# Patient Record
Sex: FEMALE | Race: ASIAN | NOT HISPANIC OR LATINO | URBAN - METROPOLITAN AREA
[De-identification: names, ages, dates, MRNs, and addresses within clinical notes are randomized per-mention and may not be internally consistent; named-entity substitution may affect disease eponyms.]

---

## 2019-02-25 ENCOUNTER — EMERGENCY (EMERGENCY)
Facility: HOSPITAL | Age: 29
LOS: 1 days | Discharge: HOME | End: 2019-02-25
Admitting: EMERGENCY MEDICINE
Payer: MEDICAID

## 2019-02-25 VITALS
HEART RATE: 87 BPM | RESPIRATION RATE: 17 BRPM | SYSTOLIC BLOOD PRESSURE: 102 MMHG | TEMPERATURE: 98 F | OXYGEN SATURATION: 99 % | DIASTOLIC BLOOD PRESSURE: 67 MMHG

## 2019-02-25 LAB
ALBUMIN SERPL ELPH-MCNC: 4.2 G/DL — SIGNIFICANT CHANGE UP (ref 3.3–5)
ALP SERPL-CCNC: 56 U/L — SIGNIFICANT CHANGE UP (ref 40–120)
ALT FLD-CCNC: 36 U/L — HIGH (ref 4–33)
ANION GAP SERPL CALC-SCNC: 11 MMO/L — SIGNIFICANT CHANGE UP (ref 7–14)
APPEARANCE UR: SIGNIFICANT CHANGE UP
AST SERPL-CCNC: 26 U/L — SIGNIFICANT CHANGE UP (ref 4–32)
BACTERIA # UR AUTO: HIGH
BASOPHILS # BLD AUTO: 0.02 K/UL — SIGNIFICANT CHANGE UP (ref 0–0.2)
BASOPHILS NFR BLD AUTO: 0.3 % — SIGNIFICANT CHANGE UP (ref 0–2)
BILIRUB SERPL-MCNC: 0.3 MG/DL — SIGNIFICANT CHANGE UP (ref 0.2–1.2)
BILIRUB UR-MCNC: NEGATIVE — SIGNIFICANT CHANGE UP
BLD GP AB SCN SERPL QL: NEGATIVE — SIGNIFICANT CHANGE UP
BLOOD UR QL VISUAL: HIGH
BUN SERPL-MCNC: 7 MG/DL — SIGNIFICANT CHANGE UP (ref 7–23)
CALCIUM SERPL-MCNC: 8.8 MG/DL — SIGNIFICANT CHANGE UP (ref 8.4–10.5)
CHLORIDE SERPL-SCNC: 103 MMOL/L — SIGNIFICANT CHANGE UP (ref 98–107)
CO2 SERPL-SCNC: 24 MMOL/L — SIGNIFICANT CHANGE UP (ref 22–31)
COLOR SPEC: YELLOW — SIGNIFICANT CHANGE UP
CREAT SERPL-MCNC: 0.63 MG/DL — SIGNIFICANT CHANGE UP (ref 0.5–1.3)
EOSINOPHIL # BLD AUTO: 0.06 K/UL — SIGNIFICANT CHANGE UP (ref 0–0.5)
EOSINOPHIL NFR BLD AUTO: 0.8 % — SIGNIFICANT CHANGE UP (ref 0–6)
GLUCOSE SERPL-MCNC: 77 MG/DL — SIGNIFICANT CHANGE UP (ref 70–99)
GLUCOSE UR-MCNC: NEGATIVE — SIGNIFICANT CHANGE UP
HCG SERPL-ACNC: SIGNIFICANT CHANGE UP MIU/ML
HCT VFR BLD CALC: 39.5 % — SIGNIFICANT CHANGE UP (ref 34.5–45)
HGB BLD-MCNC: 12.5 G/DL — SIGNIFICANT CHANGE UP (ref 11.5–15.5)
HYALINE CASTS # UR AUTO: SIGNIFICANT CHANGE UP
IMM GRANULOCYTES NFR BLD AUTO: 0.3 % — SIGNIFICANT CHANGE UP (ref 0–1.5)
KETONES UR-MCNC: NEGATIVE — SIGNIFICANT CHANGE UP
LEUKOCYTE ESTERASE UR-ACNC: SIGNIFICANT CHANGE UP
LYMPHOCYTES # BLD AUTO: 1.87 K/UL — SIGNIFICANT CHANGE UP (ref 1–3.3)
LYMPHOCYTES # BLD AUTO: 23.6 % — SIGNIFICANT CHANGE UP (ref 13–44)
MCHC RBC-ENTMCNC: 29.2 PG — SIGNIFICANT CHANGE UP (ref 27–34)
MCHC RBC-ENTMCNC: 31.6 % — LOW (ref 32–36)
MCV RBC AUTO: 92.3 FL — SIGNIFICANT CHANGE UP (ref 80–100)
MONOCYTES # BLD AUTO: 0.72 K/UL — SIGNIFICANT CHANGE UP (ref 0–0.9)
MONOCYTES NFR BLD AUTO: 9.1 % — SIGNIFICANT CHANGE UP (ref 2–14)
NEUTROPHILS # BLD AUTO: 5.22 K/UL — SIGNIFICANT CHANGE UP (ref 1.8–7.4)
NEUTROPHILS NFR BLD AUTO: 65.9 % — SIGNIFICANT CHANGE UP (ref 43–77)
NITRITE UR-MCNC: POSITIVE — HIGH
NRBC # FLD: 0 K/UL — LOW (ref 25–125)
PH UR: 7 — SIGNIFICANT CHANGE UP (ref 5–8)
PLATELET # BLD AUTO: 217 K/UL — SIGNIFICANT CHANGE UP (ref 150–400)
PMV BLD: 13 FL — SIGNIFICANT CHANGE UP (ref 7–13)
POTASSIUM SERPL-MCNC: 3.9 MMOL/L — SIGNIFICANT CHANGE UP (ref 3.5–5.3)
POTASSIUM SERPL-SCNC: 3.9 MMOL/L — SIGNIFICANT CHANGE UP (ref 3.5–5.3)
PROT SERPL-MCNC: 6.8 G/DL — SIGNIFICANT CHANGE UP (ref 6–8.3)
PROT UR-MCNC: 50 — SIGNIFICANT CHANGE UP
RBC # BLD: 4.28 M/UL — SIGNIFICANT CHANGE UP (ref 3.8–5.2)
RBC # FLD: 11.9 % — SIGNIFICANT CHANGE UP (ref 10.3–14.5)
RBC CASTS # UR COMP ASSIST: HIGH (ref 0–?)
RH IG SCN BLD-IMP: POSITIVE — SIGNIFICANT CHANGE UP
SODIUM SERPL-SCNC: 138 MMOL/L — SIGNIFICANT CHANGE UP (ref 135–145)
SP GR SPEC: 1.03 — SIGNIFICANT CHANGE UP (ref 1–1.04)
SQUAMOUS # UR AUTO: SIGNIFICANT CHANGE UP
UROBILINOGEN FLD QL: NORMAL — SIGNIFICANT CHANGE UP
WBC # BLD: 7.91 K/UL — SIGNIFICANT CHANGE UP (ref 3.8–10.5)
WBC # FLD AUTO: 7.91 K/UL — SIGNIFICANT CHANGE UP (ref 3.8–10.5)
WBC UR QL: HIGH (ref 0–?)

## 2019-02-25 PROCEDURE — 76830 TRANSVAGINAL US NON-OB: CPT | Mod: 26

## 2019-02-25 PROCEDURE — 99284 EMERGENCY DEPT VISIT MOD MDM: CPT

## 2019-02-25 RX ORDER — CEFTRIAXONE 500 MG/1
1 INJECTION, POWDER, FOR SOLUTION INTRAMUSCULAR; INTRAVENOUS ONCE
Qty: 0 | Refills: 0 | Status: COMPLETED | OUTPATIENT
Start: 2019-02-25 | End: 2019-02-25

## 2019-02-25 RX ORDER — ONDANSETRON 8 MG/1
4 TABLET, FILM COATED ORAL ONCE
Qty: 0 | Refills: 0 | Status: COMPLETED | OUTPATIENT
Start: 2019-02-25 | End: 2019-02-25

## 2019-02-25 RX ORDER — SODIUM CHLORIDE 9 MG/ML
1000 INJECTION INTRAMUSCULAR; INTRAVENOUS; SUBCUTANEOUS ONCE
Qty: 0 | Refills: 0 | Status: COMPLETED | OUTPATIENT
Start: 2019-02-25 | End: 2019-02-25

## 2019-02-25 RX ORDER — CEPHALEXIN 500 MG
1 CAPSULE ORAL
Qty: 14 | Refills: 0
Start: 2019-02-25 | End: 2019-03-03

## 2019-02-25 RX ADMIN — ONDANSETRON 4 MILLIGRAM(S): 8 TABLET, FILM COATED ORAL at 13:26

## 2019-02-25 RX ADMIN — SODIUM CHLORIDE 1000 MILLILITER(S): 9 INJECTION INTRAMUSCULAR; INTRAVENOUS; SUBCUTANEOUS at 13:26

## 2019-02-25 RX ADMIN — CEFTRIAXONE 100 GRAM(S): 500 INJECTION, POWDER, FOR SOLUTION INTRAMUSCULAR; INTRAVENOUS at 15:28

## 2019-02-25 NOTE — ED PROVIDER NOTE - NSFOLLOWUPINSTRUCTIONS_ED_ALL_ED_FT
Drink plenty of fluids - stay hydrated. Take Keflex 500mg twice daily for 7 days for urinary tract infection. Please continue all home medications as directed. See your OBGYN within 72 hours for follow-up care - bring a copy of your results with you to your appointment. Return to ER for new or worsening symptoms.

## 2019-02-25 NOTE — ED PROVIDER NOTE - CARE PLAN
Principal Discharge DX:	Subchorionic hematoma in first trimester  Secondary Diagnosis:	UTI (urinary tract infection) during pregnancy

## 2019-02-25 NOTE — ED ADULT NURSE NOTE - OBJECTIVE STATEMENT
Pt preg, LMP 1/15, c/o vaginal spotting x 10 days, pain only on first day. + nausea. 20 g line placed in rt AC, labs sent, medicated as per order.

## 2019-02-25 NOTE — ED PROVIDER NOTE - OBJECTIVE STATEMENT
28 year old female  ~ 7/8 weeks gestation by LMP (no US confirmation) with no pertinent PMHx pw vaginal spotting for ~ 10 days, yesterday had lower abdominal cramping (which has resolved) and today had 1 episode of NB/NB vomiting. Pt states that she had a miscarriage last year. OBGYN Dr. Barclay (has not had OBGYN w/u during this pregnancy). She is also endorsing vaginal itching which she has had intermittently for several weeks. Unknown blood type. Denies f/c, CP, SOB, dysuria, urinary frequency, urinary urgency, hematuria, melena, hematochezia, diarrhea, constipation.

## 2019-02-25 NOTE — ED PROVIDER NOTE - CLINICAL SUMMARY MEDICAL DECISION MAKING FREE TEXT BOX
28 year old female  ~ 7/8 weeks gestation by LMP (no US confirmation) with no pertinent PMHx pw vaginal spotting for ~ 10 days, yesterday had lower abdominal cramping (which has resolved) and today had 1 episode of NB/NB vomiting  Assessment: abdomen: soft, nontender, nondistended, : no adnexal tenderness, +blood in vaginal vault.  Plan: type and screen r/o negative RH factor, TVUS r/o threatened ab vs ectopic

## 2019-02-26 LAB — SPECIMEN SOURCE: SIGNIFICANT CHANGE UP

## 2019-02-27 LAB
-  AMIKACIN: SIGNIFICANT CHANGE UP
-  AMPICILLIN/SULBACTAM: SIGNIFICANT CHANGE UP
-  AMPICILLIN: SIGNIFICANT CHANGE UP
-  AZTREONAM: SIGNIFICANT CHANGE UP
-  CEFAZOLIN: SIGNIFICANT CHANGE UP
-  CEFEPIME: SIGNIFICANT CHANGE UP
-  CEFOXITIN: SIGNIFICANT CHANGE UP
-  CEFTAZIDIME: SIGNIFICANT CHANGE UP
-  CEFTRIAXONE: SIGNIFICANT CHANGE UP
-  CIPROFLOXACIN: SIGNIFICANT CHANGE UP
-  ERTAPENEM: SIGNIFICANT CHANGE UP
-  GENTAMICIN: SIGNIFICANT CHANGE UP
-  IMIPENEM: SIGNIFICANT CHANGE UP
-  LEVOFLOXACIN: SIGNIFICANT CHANGE UP
-  MEROPENEM: SIGNIFICANT CHANGE UP
-  NITROFURANTOIN: SIGNIFICANT CHANGE UP
-  PIPERACILLIN/TAZOBACTAM: SIGNIFICANT CHANGE UP
-  TIGECYCLINE: SIGNIFICANT CHANGE UP
-  TOBRAMYCIN: SIGNIFICANT CHANGE UP
-  TRIMETHOPRIM/SULFAMETHOXAZOLE: SIGNIFICANT CHANGE UP
BACTERIA UR CULT: SIGNIFICANT CHANGE UP
METHOD TYPE: SIGNIFICANT CHANGE UP
ORGANISM # SPEC MICROSCOPIC CNT: SIGNIFICANT CHANGE UP
ORGANISM # SPEC MICROSCOPIC CNT: SIGNIFICANT CHANGE UP

## 2019-03-11 ENCOUNTER — EMERGENCY (EMERGENCY)
Facility: HOSPITAL | Age: 29
LOS: 1 days | Discharge: HOME | End: 2019-03-11
Attending: EMERGENCY MEDICINE | Admitting: EMERGENCY MEDICINE
Payer: MEDICAID

## 2019-03-11 VITALS
SYSTOLIC BLOOD PRESSURE: 98 MMHG | DIASTOLIC BLOOD PRESSURE: 63 MMHG | OXYGEN SATURATION: 99 % | TEMPERATURE: 98 F | HEART RATE: 105 BPM | RESPIRATION RATE: 16 BRPM

## 2019-03-11 VITALS
RESPIRATION RATE: 18 BRPM | HEART RATE: 78 BPM | SYSTOLIC BLOOD PRESSURE: 104 MMHG | TEMPERATURE: 98 F | DIASTOLIC BLOOD PRESSURE: 59 MMHG | OXYGEN SATURATION: 100 %

## 2019-03-11 LAB
ALBUMIN SERPL ELPH-MCNC: 4.4 G/DL — SIGNIFICANT CHANGE UP (ref 3.3–5)
ALP SERPL-CCNC: 59 U/L — SIGNIFICANT CHANGE UP (ref 40–120)
ALT FLD-CCNC: 25 U/L — SIGNIFICANT CHANGE UP (ref 4–33)
ANION GAP SERPL CALC-SCNC: 13 MMO/L — SIGNIFICANT CHANGE UP (ref 7–14)
APPEARANCE UR: CLEAR — SIGNIFICANT CHANGE UP
AST SERPL-CCNC: 20 U/L — SIGNIFICANT CHANGE UP (ref 4–32)
BACTERIA # UR AUTO: NEGATIVE — SIGNIFICANT CHANGE UP
BASOPHILS # BLD AUTO: 0.04 K/UL — SIGNIFICANT CHANGE UP (ref 0–0.2)
BASOPHILS NFR BLD AUTO: 0.4 % — SIGNIFICANT CHANGE UP (ref 0–2)
BILIRUB SERPL-MCNC: 0.2 MG/DL — SIGNIFICANT CHANGE UP (ref 0.2–1.2)
BILIRUB UR-MCNC: NEGATIVE — SIGNIFICANT CHANGE UP
BLOOD UR QL VISUAL: HIGH
BUN SERPL-MCNC: 10 MG/DL — SIGNIFICANT CHANGE UP (ref 7–23)
CALCIUM SERPL-MCNC: 9.3 MG/DL — SIGNIFICANT CHANGE UP (ref 8.4–10.5)
CHLORIDE SERPL-SCNC: 102 MMOL/L — SIGNIFICANT CHANGE UP (ref 98–107)
CO2 SERPL-SCNC: 22 MMOL/L — SIGNIFICANT CHANGE UP (ref 22–31)
COLOR SPEC: YELLOW — SIGNIFICANT CHANGE UP
CREAT SERPL-MCNC: 0.62 MG/DL — SIGNIFICANT CHANGE UP (ref 0.5–1.3)
EOSINOPHIL # BLD AUTO: 0.14 K/UL — SIGNIFICANT CHANGE UP (ref 0–0.5)
EOSINOPHIL NFR BLD AUTO: 1.6 % — SIGNIFICANT CHANGE UP (ref 0–6)
GLUCOSE SERPL-MCNC: 83 MG/DL — SIGNIFICANT CHANGE UP (ref 70–99)
GLUCOSE UR-MCNC: 200 — HIGH
HCG SERPL-ACNC: SIGNIFICANT CHANGE UP MIU/ML
HCT VFR BLD CALC: 42.1 % — SIGNIFICANT CHANGE UP (ref 34.5–45)
HGB BLD-MCNC: 13.7 G/DL — SIGNIFICANT CHANGE UP (ref 11.5–15.5)
HYALINE CASTS # UR AUTO: HIGH
IMM GRANULOCYTES NFR BLD AUTO: 0.3 % — SIGNIFICANT CHANGE UP (ref 0–1.5)
KETONES UR-MCNC: SIGNIFICANT CHANGE UP
LEUKOCYTE ESTERASE UR-ACNC: SIGNIFICANT CHANGE UP
LIDOCAIN IGE QN: 50.9 U/L — SIGNIFICANT CHANGE UP (ref 7–60)
LYMPHOCYTES # BLD AUTO: 2.25 K/UL — SIGNIFICANT CHANGE UP (ref 1–3.3)
LYMPHOCYTES # BLD AUTO: 25.2 % — SIGNIFICANT CHANGE UP (ref 13–44)
MCHC RBC-ENTMCNC: 29.5 PG — SIGNIFICANT CHANGE UP (ref 27–34)
MCHC RBC-ENTMCNC: 32.5 % — SIGNIFICANT CHANGE UP (ref 32–36)
MCV RBC AUTO: 90.7 FL — SIGNIFICANT CHANGE UP (ref 80–100)
MONOCYTES # BLD AUTO: 0.91 K/UL — HIGH (ref 0–0.9)
MONOCYTES NFR BLD AUTO: 10.2 % — SIGNIFICANT CHANGE UP (ref 2–14)
NEUTROPHILS # BLD AUTO: 5.55 K/UL — SIGNIFICANT CHANGE UP (ref 1.8–7.4)
NEUTROPHILS NFR BLD AUTO: 62.3 % — SIGNIFICANT CHANGE UP (ref 43–77)
NITRITE UR-MCNC: NEGATIVE — SIGNIFICANT CHANGE UP
NRBC # FLD: 0 K/UL — LOW (ref 25–125)
PH UR: 6 — SIGNIFICANT CHANGE UP (ref 5–8)
PLATELET # BLD AUTO: 253 K/UL — SIGNIFICANT CHANGE UP (ref 150–400)
PMV BLD: 13 FL — SIGNIFICANT CHANGE UP (ref 7–13)
POTASSIUM SERPL-MCNC: 3.8 MMOL/L — SIGNIFICANT CHANGE UP (ref 3.5–5.3)
POTASSIUM SERPL-SCNC: 3.8 MMOL/L — SIGNIFICANT CHANGE UP (ref 3.5–5.3)
PROT SERPL-MCNC: 7.5 G/DL — SIGNIFICANT CHANGE UP (ref 6–8.3)
PROT UR-MCNC: 30 — SIGNIFICANT CHANGE UP
RBC # BLD: 4.64 M/UL — SIGNIFICANT CHANGE UP (ref 3.8–5.2)
RBC # FLD: 12 % — SIGNIFICANT CHANGE UP (ref 10.3–14.5)
RBC CASTS # UR COMP ASSIST: SIGNIFICANT CHANGE UP (ref 0–?)
SODIUM SERPL-SCNC: 137 MMOL/L — SIGNIFICANT CHANGE UP (ref 135–145)
SP GR SPEC: 1.04 — SIGNIFICANT CHANGE UP (ref 1–1.04)
SQUAMOUS # UR AUTO: SIGNIFICANT CHANGE UP
UROBILINOGEN FLD QL: SIGNIFICANT CHANGE UP
WBC # BLD: 8.92 K/UL — SIGNIFICANT CHANGE UP (ref 3.8–10.5)
WBC # FLD AUTO: 8.92 K/UL — SIGNIFICANT CHANGE UP (ref 3.8–10.5)
WBC UR QL: HIGH (ref 0–?)

## 2019-03-11 PROCEDURE — 99285 EMERGENCY DEPT VISIT HI MDM: CPT

## 2019-03-11 PROCEDURE — 76830 TRANSVAGINAL US NON-OB: CPT | Mod: 26

## 2019-03-11 RX ORDER — SODIUM CHLORIDE 9 MG/ML
1000 INJECTION, SOLUTION INTRAVENOUS ONCE
Qty: 0 | Refills: 0 | Status: COMPLETED | OUTPATIENT
Start: 2019-03-11 | End: 2019-03-11

## 2019-03-11 RX ORDER — METOCLOPRAMIDE HCL 10 MG
10 TABLET ORAL ONCE
Qty: 0 | Refills: 0 | Status: COMPLETED | OUTPATIENT
Start: 2019-03-11 | End: 2019-03-11

## 2019-03-11 RX ORDER — ONDANSETRON 8 MG/1
4 TABLET, FILM COATED ORAL ONCE
Qty: 0 | Refills: 0 | Status: COMPLETED | OUTPATIENT
Start: 2019-03-11 | End: 2019-03-11

## 2019-03-11 RX ADMIN — SODIUM CHLORIDE 2000 MILLILITER(S): 9 INJECTION, SOLUTION INTRAVENOUS at 19:10

## 2019-03-11 RX ADMIN — Medication 10 MILLIGRAM(S): at 19:14

## 2019-03-11 RX ADMIN — ONDANSETRON 4 MILLIGRAM(S): 8 TABLET, FILM COATED ORAL at 16:50

## 2019-03-11 RX ADMIN — SODIUM CHLORIDE 2000 MILLILITER(S): 9 INJECTION, SOLUTION INTRAVENOUS at 16:57

## 2019-03-11 NOTE — ED PROVIDER NOTE - NS ED ROS FT
Constitutional: Well developed, well nourished. NAD. Good general hygiene  Head: Atraumatic.  Eyes: PERRLA. EOMI without discomfort.   ENT: Moderately dry oral mucosa.  Neck: Supple. Painless ROM.  Cardiovascular: TAchy/regular.   Pulmonary: Normal respiratory rate and effort. Lungs clear to auscultation bilaterally. No wheezing, rales, or rhonchi. Bilateral, equal lung expansion.   Abdominal: Soft. Nondistended. Nontender. No rebound or guarding.   Extremities. Pelvis stable. No lower extremity edema. Symmetric calves.  Skin: No rashes.   Neuro: AAOx3. No focal neurological deficits.  Psych: Normal mood. Normal affect.

## 2019-03-11 NOTE — ED PROVIDER NOTE - PROGRESS NOTE DETAILS
Declines speculum exam Jovany: feels better; tolerated PO; US viable IUP; has zofran at home will d/c

## 2019-03-11 NOTE — ED PROVIDER NOTE - NSFOLLOWUPINSTRUCTIONS_ED_ALL_ED_FT
Follow up with your OB/GYN as scheduled  Return to ED for persistent vomiting, unable to eat, vaginal bleeding or any other symptoms in which you feel you require immediate attention

## 2019-03-11 NOTE — ED PROVIDER NOTE - ATTENDING CONTRIBUTION TO CARE
Seen and examined, NAD at time of exam, c/o N/V, abd cramping, BRB spotting, states 6wks preg, had prev. U/S ruling out ectopic preg.  PMD had rx zofran without relief of N/V. Today felt worse and called OB and told to go to ED. Clear lungs, heart reg, no murmur, soft, flat abd, Nt to palp, no CVAT, no edema, NT calves.

## 2019-03-11 NOTE — ED PROVIDER NOTE - OBJECTIVE STATEMENT
28 year old woman no signif PMH p/w vomiting.  currently 6 weeks gestation here with 3 days of intractable nausea and vomiting now feeling weak and dizzy, urinating less frequently. Has also noted small amount of spotty vaginal bleeding over same time period but no contractions or abdominal pain. Of note seen here 19 for bleeding, had IUP confirmed, Rh+. No urinary symptoms. Prior pregnancies uncomplicated, 2 c-sections.

## 2019-03-13 LAB
BACTERIA UR CULT: SIGNIFICANT CHANGE UP
SPECIMEN SOURCE: SIGNIFICANT CHANGE UP

## 2019-09-09 ENCOUNTER — OUTPATIENT (OUTPATIENT)
Dept: OUTPATIENT SERVICES | Facility: HOSPITAL | Age: 29
LOS: 1 days | Discharge: HOME | End: 2019-09-09
Payer: MEDICAID

## 2019-09-09 VITALS
SYSTOLIC BLOOD PRESSURE: 95 MMHG | TEMPERATURE: 99 F | RESPIRATION RATE: 16 BRPM | HEART RATE: 76 BPM | DIASTOLIC BLOOD PRESSURE: 63 MMHG

## 2019-09-09 VITALS — DIASTOLIC BLOOD PRESSURE: 63 MMHG | HEART RATE: 76 BPM | SYSTOLIC BLOOD PRESSURE: 95 MMHG

## 2019-09-09 DIAGNOSIS — O26.899 OTHER SPECIFIED PREGNANCY RELATED CONDITIONS, UNSPECIFIED TRIMESTER: ICD-10-CM

## 2019-09-09 DIAGNOSIS — Z3A.00 WEEKS OF GESTATION OF PREGNANCY NOT SPECIFIED: ICD-10-CM

## 2019-09-09 DIAGNOSIS — Z98.891 HISTORY OF UTERINE SCAR FROM PREVIOUS SURGERY: Chronic | ICD-10-CM

## 2019-09-09 PROCEDURE — 59025 FETAL NON-STRESS TEST: CPT | Mod: 26

## 2019-09-09 NOTE — OB PROVIDER TRIAGE NOTE - HISTORY OF PRESENT ILLNESS
28y  at 34w6d presents to triage c/o feeling wet once today at 2pm, no other episodes thereafter. Reports +FM, no vaginal bleeding  AP complications: Denies  Received prenatal care at [facility]. h/o 2 prior C/s.

## 2019-09-09 NOTE — OB RN TRIAGE NOTE - BP NONINVASIVE DIASTOLIC (MM HG)
I will notify you of official read by radiology tomorrow.   Return urgently if any change in symptoms like chest pain, increasing cough, fever, shortness of breath or other change in symptoms.   Follow up with Dr. Casiano in 2 weeks if no improvement in symptoms   
63

## 2019-09-09 NOTE — OB PROVIDER TRIAGE NOTE - PLAN OF CARE
Discharge home with instructions   labor precatuion  Pt to follow up with her clinic as scheduled  Pt to increase PO hydration

## 2019-09-09 NOTE — OB PROVIDER TRIAGE NOTE - NS_OBGYNHISTORY_OBGYN_ALL_OB_FT
OBGYN HISTORY  GYN: Denies any h/o STDs, fibroids, ovarian Cyst, or abnormal pap smear  OBH: 2 FT C/s for IUGR  S/P : 20 girl 5-0 CPD  2015 9/16/15 FT Repeat C/s girl 5-3

## 2019-09-09 NOTE — OB PROVIDER TRIAGE NOTE - NSOBPROVIDERNOTE_OBGYN_ALL_OB_FT
28y  at 34w6d presents to triage c/o feeling wet once today at 2pm, no other episodes thereafter. Reports +FM, no vaginal bleeding  AP complications: Denies  Received prenatal care at [facility]. h/o 2 prior C/s.    OBGYN HISTORY  GYN: Denies any h/o STDs, fibroids, ovarian Cyst, or abnormal pap smear  OBH: 2 FT C/s for IUGR  PAST MEDICAL & SURGICAL HISTORY:  No pertinent past medical history  S/P : 20 girl 5-0 CPD  2015 9/16/15 FT Repeat C/s girl 5-3    Allergies: No Known Allergies    Social History:  Denies any h/o alcohol, tobacco, or drug use during the pregnancy    T(C): 37 (19 @ 16:28), Max: 37 (19 @ 16:28)  HR: 76 (19 @ 18:38) (76 - 76)  BP: 95/63 (19 @ 18:38) (95/63 - 95/63)  RR: 16 (19 @ 16:28) (16 - 16)    Heart: RRR  Lungs: CTA  Abdomen: Gravid, soft, NT    NST: Reactive with moderate variability, Category 1 tracing  [place]: Regular contractions  VE: long/closed/post/high, intact membranes  SSE: no pooling, nitrazine neg, fern neg  TAS: SLIUP, Cephalic, SHELL: 16.5, BPP 8/8    D/w Dr Gómez, Dr Iqbal aware  Discharge home with instructions   labor precatuion  Pt to follow up with her clinic as scheduled  Pt to increase PO hydration

## 2019-09-09 NOTE — OB RN TRIAGE NOTE - NSLDARRIVAL_OBGYN_ALL_OB_START_DATE
09-Sep-2019 16:29 normal (ped)... In no apparent distress, appears well developed and well nourished.  Playing, active, eating.

## 2019-09-09 NOTE — OB PROVIDER TRIAGE NOTE - NSHPPHYSICALEXAM_GEN_ALL_CORE
T(C): 37 (09-09-19 @ 16:28), Max: 37 (09-09-19 @ 16:28)  HR: 76 (09-09-19 @ 18:38) (76 - 76)  BP: 95/63 (09-09-19 @ 18:38) (95/63 - 95/63)  RR: 16 (09-09-19 @ 16:28) (16 - 16)    Heart: RRR  Lungs: CTA  Abdomen: Gravid, soft, NT    NST: Reactive with moderate variability, Category 1 tracing  [place]: Irregular contractions, not felt by patient  VE: long/closed/post/high, intact membranes  SSE: no pooling, nitrazine neg, fern neg  TAS: SLIUP, Cephalic, SHELL: 16.5, BPP 8/8

## 2019-09-13 PROBLEM — Z00.00 ENCOUNTER FOR PREVENTIVE HEALTH EXAMINATION: Status: ACTIVE | Noted: 2019-09-13

## 2019-09-17 ENCOUNTER — APPOINTMENT (OUTPATIENT)
Dept: OBGYN | Facility: CLINIC | Age: 29
End: 2019-09-17

## 2019-10-07 ENCOUNTER — TRANSCRIPTION ENCOUNTER (OUTPATIENT)
Age: 29
End: 2019-10-07

## 2019-10-08 ENCOUNTER — INPATIENT (INPATIENT)
Facility: HOSPITAL | Age: 29
LOS: 2 days | Discharge: HOME | End: 2019-10-11
Attending: SPECIALIST | Admitting: SPECIALIST
Payer: MEDICAID

## 2019-10-08 VITALS
SYSTOLIC BLOOD PRESSURE: 115 MMHG | TEMPERATURE: 99 F | HEART RATE: 81 BPM | DIASTOLIC BLOOD PRESSURE: 77 MMHG | RESPIRATION RATE: 18 BRPM

## 2019-10-08 DIAGNOSIS — Z98.891 HISTORY OF UTERINE SCAR FROM PREVIOUS SURGERY: Chronic | ICD-10-CM

## 2019-10-08 DIAGNOSIS — Z3A.00 WEEKS OF GESTATION OF PREGNANCY NOT SPECIFIED: ICD-10-CM

## 2019-10-08 DIAGNOSIS — O26.899 OTHER SPECIFIED PREGNANCY RELATED CONDITIONS, UNSPECIFIED TRIMESTER: ICD-10-CM

## 2019-10-08 LAB
BASOPHILS # BLD AUTO: 0.02 K/UL — SIGNIFICANT CHANGE UP (ref 0–0.2)
BASOPHILS NFR BLD AUTO: 0.2 % — SIGNIFICANT CHANGE UP (ref 0–2)
BLD GP AB SCN SERPL QL: NEGATIVE — SIGNIFICANT CHANGE UP
EOSINOPHIL # BLD AUTO: 0.03 K/UL — SIGNIFICANT CHANGE UP (ref 0–0.5)
EOSINOPHIL NFR BLD AUTO: 0.3 % — SIGNIFICANT CHANGE UP (ref 0–6)
HBV SURFACE AG SER-ACNC: NEGATIVE — SIGNIFICANT CHANGE UP
HCT VFR BLD CALC: 40.7 % — SIGNIFICANT CHANGE UP (ref 34.5–45)
HGB BLD-MCNC: 12.6 G/DL — SIGNIFICANT CHANGE UP (ref 11.5–15.5)
HIV COMBO RESULT: SIGNIFICANT CHANGE UP
HIV1+2 AB SPEC QL: SIGNIFICANT CHANGE UP
IMM GRANULOCYTES NFR BLD AUTO: 0.3 % — SIGNIFICANT CHANGE UP (ref 0–1.5)
LYMPHOCYTES # BLD AUTO: 1.44 K/UL — SIGNIFICANT CHANGE UP (ref 1–3.3)
LYMPHOCYTES # BLD AUTO: 14.6 % — SIGNIFICANT CHANGE UP (ref 13–44)
MCHC RBC-ENTMCNC: 28.6 PG — SIGNIFICANT CHANGE UP (ref 27–34)
MCHC RBC-ENTMCNC: 31 % — LOW (ref 32–36)
MCV RBC AUTO: 92.5 FL — SIGNIFICANT CHANGE UP (ref 80–100)
MONOCYTES # BLD AUTO: 0.75 K/UL — SIGNIFICANT CHANGE UP (ref 0–0.9)
MONOCYTES NFR BLD AUTO: 7.6 % — SIGNIFICANT CHANGE UP (ref 2–14)
NEUTROPHILS # BLD AUTO: 7.59 K/UL — HIGH (ref 1.8–7.4)
NEUTROPHILS NFR BLD AUTO: 77 % — SIGNIFICANT CHANGE UP (ref 43–77)
NRBC # FLD: 0 K/UL — SIGNIFICANT CHANGE UP (ref 0–0)
PLATELET # BLD AUTO: 147 K/UL — LOW (ref 150–400)
PMV BLD: 13.9 FL — HIGH (ref 7–13)
RBC # BLD: 4.4 M/UL — SIGNIFICANT CHANGE UP (ref 3.8–5.2)
RBC # FLD: 15.1 % — HIGH (ref 10.3–14.5)
RH IG SCN BLD-IMP: POSITIVE — SIGNIFICANT CHANGE UP
RH IG SCN BLD-IMP: POSITIVE — SIGNIFICANT CHANGE UP
WBC # BLD: 9.86 K/UL — SIGNIFICANT CHANGE UP (ref 3.8–10.5)
WBC # FLD AUTO: 9.86 K/UL — SIGNIFICANT CHANGE UP (ref 3.8–10.5)

## 2019-10-08 PROCEDURE — 59514 CESAREAN DELIVERY ONLY: CPT | Mod: U9,UB,GC

## 2019-10-08 PROCEDURE — 58300 INSERT INTRAUTERINE DEVICE: CPT | Mod: GC

## 2019-10-08 RX ORDER — SODIUM CHLORIDE 9 MG/ML
1000 INJECTION, SOLUTION INTRAVENOUS
Refills: 0 | Status: DISCONTINUED | OUTPATIENT
Start: 2019-10-08 | End: 2019-10-09

## 2019-10-08 RX ORDER — SODIUM CHLORIDE 9 MG/ML
1000 INJECTION, SOLUTION INTRAVENOUS
Refills: 0 | Status: DISCONTINUED | OUTPATIENT
Start: 2019-10-08 | End: 2019-10-08

## 2019-10-08 RX ORDER — LANOLIN
1 OINTMENT (GRAM) TOPICAL EVERY 6 HOURS
Refills: 0 | Status: DISCONTINUED | OUTPATIENT
Start: 2019-10-08 | End: 2019-10-11

## 2019-10-08 RX ORDER — OXYTOCIN 10 UNIT/ML
333.33 VIAL (ML) INJECTION
Qty: 20 | Refills: 0 | Status: DISCONTINUED | OUTPATIENT
Start: 2019-10-08 | End: 2019-10-08

## 2019-10-08 RX ORDER — KETOROLAC TROMETHAMINE 30 MG/ML
30 SYRINGE (ML) INJECTION EVERY 6 HOURS
Refills: 0 | Status: DISCONTINUED | OUTPATIENT
Start: 2019-10-08 | End: 2019-10-09

## 2019-10-08 RX ORDER — IBUPROFEN 200 MG
600 TABLET ORAL EVERY 6 HOURS
Refills: 0 | Status: COMPLETED | OUTPATIENT
Start: 2019-10-08 | End: 2020-09-05

## 2019-10-08 RX ORDER — MAGNESIUM HYDROXIDE 400 MG/1
30 TABLET, CHEWABLE ORAL
Refills: 0 | Status: DISCONTINUED | OUTPATIENT
Start: 2019-10-08 | End: 2019-10-11

## 2019-10-08 RX ORDER — OXYTOCIN 10 UNIT/ML
333.33 VIAL (ML) INJECTION
Qty: 20 | Refills: 0 | Status: DISCONTINUED | OUTPATIENT
Start: 2019-10-08 | End: 2019-10-09

## 2019-10-08 RX ORDER — METOCLOPRAMIDE HCL 10 MG
10 TABLET ORAL ONCE
Refills: 0 | Status: DISCONTINUED | OUTPATIENT
Start: 2019-10-08 | End: 2019-10-08

## 2019-10-08 RX ORDER — HEPARIN SODIUM 5000 [USP'U]/ML
5000 INJECTION INTRAVENOUS; SUBCUTANEOUS EVERY 12 HOURS
Refills: 0 | Status: DISCONTINUED | OUTPATIENT
Start: 2019-10-08 | End: 2019-10-11

## 2019-10-08 RX ORDER — SIMETHICONE 80 MG/1
80 TABLET, CHEWABLE ORAL EVERY 4 HOURS
Refills: 0 | Status: DISCONTINUED | OUTPATIENT
Start: 2019-10-08 | End: 2019-10-11

## 2019-10-08 RX ORDER — CITRIC ACID/SODIUM CITRATE 300-500 MG
30 SOLUTION, ORAL ORAL ONCE
Refills: 0 | Status: COMPLETED | OUTPATIENT
Start: 2019-10-08 | End: 2019-10-08

## 2019-10-08 RX ORDER — FAMOTIDINE 10 MG/ML
20 INJECTION INTRAVENOUS ONCE
Refills: 0 | Status: DISCONTINUED | OUTPATIENT
Start: 2019-10-08 | End: 2019-10-08

## 2019-10-08 RX ORDER — CITRIC ACID/SODIUM CITRATE 300-500 MG
30 SOLUTION, ORAL ORAL ONCE
Refills: 0 | Status: DISCONTINUED | OUTPATIENT
Start: 2019-10-08 | End: 2019-10-08

## 2019-10-08 RX ORDER — OXYCODONE HYDROCHLORIDE 5 MG/1
5 TABLET ORAL ONCE
Refills: 0 | Status: DISCONTINUED | OUTPATIENT
Start: 2019-10-08 | End: 2019-10-11

## 2019-10-08 RX ORDER — SODIUM CHLORIDE 9 MG/ML
1000 INJECTION, SOLUTION INTRAVENOUS ONCE
Refills: 0 | Status: DISCONTINUED | OUTPATIENT
Start: 2019-10-08 | End: 2019-10-08

## 2019-10-08 RX ORDER — SODIUM CHLORIDE 9 MG/ML
1000 INJECTION, SOLUTION INTRAVENOUS ONCE
Refills: 0 | Status: COMPLETED | OUTPATIENT
Start: 2019-10-08 | End: 2019-10-08

## 2019-10-08 RX ORDER — OXYCODONE HYDROCHLORIDE 5 MG/1
5 TABLET ORAL
Refills: 0 | Status: COMPLETED | OUTPATIENT
Start: 2019-10-08 | End: 2019-10-15

## 2019-10-08 RX ORDER — TETANUS TOXOID, REDUCED DIPHTHERIA TOXOID AND ACELLULAR PERTUSSIS VACCINE, ADSORBED 5; 2.5; 8; 8; 2.5 [IU]/.5ML; [IU]/.5ML; UG/.5ML; UG/.5ML; UG/.5ML
0.5 SUSPENSION INTRAMUSCULAR ONCE
Refills: 0 | Status: DISCONTINUED | OUTPATIENT
Start: 2019-10-08 | End: 2019-10-11

## 2019-10-08 RX ORDER — DIPHENHYDRAMINE HCL 50 MG
25 CAPSULE ORAL EVERY 6 HOURS
Refills: 0 | Status: DISCONTINUED | OUTPATIENT
Start: 2019-10-08 | End: 2019-10-11

## 2019-10-08 RX ORDER — METOCLOPRAMIDE HCL 10 MG
10 TABLET ORAL ONCE
Refills: 0 | Status: COMPLETED | OUTPATIENT
Start: 2019-10-08 | End: 2019-10-08

## 2019-10-08 RX ORDER — FAMOTIDINE 10 MG/ML
20 INJECTION INTRAVENOUS ONCE
Refills: 0 | Status: COMPLETED | OUTPATIENT
Start: 2019-10-08 | End: 2019-10-08

## 2019-10-08 RX ORDER — DOCUSATE SODIUM 100 MG
100 CAPSULE ORAL
Refills: 0 | Status: DISCONTINUED | OUTPATIENT
Start: 2019-10-08 | End: 2019-10-11

## 2019-10-08 RX ORDER — GLYCERIN ADULT
1 SUPPOSITORY, RECTAL RECTAL AT BEDTIME
Refills: 0 | Status: DISCONTINUED | OUTPATIENT
Start: 2019-10-08 | End: 2019-10-11

## 2019-10-08 RX ADMIN — Medication 1000 MILLIUNIT(S)/MIN: at 22:11

## 2019-10-08 RX ADMIN — Medication 10 MILLIGRAM(S): at 18:33

## 2019-10-08 RX ADMIN — Medication 30 MILLILITER(S): at 18:33

## 2019-10-08 RX ADMIN — SODIUM CHLORIDE 2000 MILLILITER(S): 9 INJECTION, SOLUTION INTRAVENOUS at 18:33

## 2019-10-08 RX ADMIN — FAMOTIDINE 20 MILLIGRAM(S): 10 INJECTION INTRAVENOUS at 18:33

## 2019-10-08 RX ADMIN — SODIUM CHLORIDE 75 MILLILITER(S): 9 INJECTION, SOLUTION INTRAVENOUS at 22:11

## 2019-10-08 NOTE — OB RN TRIAGE NOTE - CHIEF COMPLAINT QUOTE
contractions since 8am  lil bleeding  scheduled c/s contractions since 8am  lil bleeding  scheduled c/s at [facility] for 10/14/19

## 2019-10-08 NOTE — OB PROVIDER H&P - ASSESSMENT
29yo  @ 38.4 wks SLIUP uncomp PNC previous  x 2 at [facility], here co ctx's Q10min with bloody show   -NST cat I with accels and mod variability; ctx's Q6-10 min; uncomfortable  -Dr Beaulieu in to speak to pt. Pt was admitted for rpt  in labor. Pt prepped for rpt .

## 2019-10-08 NOTE — OB PROVIDER TRIAGE NOTE - NSOBPROVIDERNOTE_OBGYN_ALL_OB_FT
29yo  @ 38.4 wks SLIUP uncomp PNC with [facility] here co ctx's Q10 min with spotting  -pt with previous  x 2  -await NST 27yo  @ 38.4 wks SLIUP uncomp PNC with [facility] here co ctx's Q10 min with spotting  -pt with previous  x 2  -NST cat I with accels and ctx's Q6-10 min; uncomfortable  -Dr Beaulieu in to see and speak to pt. Pt was admitted for rpt

## 2019-10-08 NOTE — OB PROVIDER TRIAGE NOTE - HISTORY OF PRESENT ILLNESS
27yo  @ 38.4 wks SLIUP uncomp PNC at [facility] here co ctx's Q10min since last night with spotting. Pt is a previous  x 2.    Pmhx-denies  Pshx/Ewiu-p-jgsscxm x 2  Meds-PNV; colace  Past wf-o-cpabpjp x 2  Gyn-denies

## 2019-10-08 NOTE — OB PROVIDER H&P - HISTORY OF PRESENT ILLNESS
29yo  @ 38.4 wks SLIUP uncomp PNC at [facility] here co ctx's Q10min since last night with spotting. Pt is a previous  x 2.    Pmhx-nevi  Pshx/Ibqt-o-dwwcgbr x 2  Meds-PNV; colace  Past ho-c-awojxol x 2  Gyn-denies

## 2019-10-08 NOTE — OB RN DELIVERY SUMMARY - NS_SEPSISRSKCALC_OBGYN_ALL_OB_FT
No temperature has been documented for this patient in CPN or on the OB Flowsheet. Ensure the highest temperature during labor was documented on the OB Flowsheet.  No gestational age at birth has been documented. Ensure delivery date/time has been entered above.  Rupture of membranes must be entered above. Rupture of membranes must be entered above. EOS calculated successfully. EOS Risk Factor: 0.5/1000 live births ([facility] national incidence); GA=38w4d; Temp=98.6; ROM=0; GBS='Unknown'; Antibiotics='No antibiotics or any antibiotics < 2 hrs prior to birth'

## 2019-10-08 NOTE — OB NEONATOLOGY/PEDIATRICIAN DELIVERY SUMMARY - NSPEDSNEONOTESA_OBGYN_ALL_OB_FT
Baby is a 38.4 wk GA female born to a 27 y/o  mother via repeat C/S. Maternal history uncomplicated. Prenatal history uncomplicated. Maternal BT A pos. PNL expedited, HIV neg, HBsAg neg, RPR pending, Rubella pending. GBS unknown. AROM at 19:47 on 10/8, clear fluids. Baby born vigorous and crying spontaneously. WDSS. Apgars 9/9. EOS 0.02. Mom plans to breastfeed and bottle feed, would like HepB. Baby is a 38.4 wk GA female born to a 27 y/o  mother via repeat C/S. Maternal history uncomplicated but received prenatal care in [place]. Prenatal history uncomplicated. Maternal BT A pos. PNL expedited, HIV neg, HBsAg neg, RPR pending, Rubella pending. GBS unknown. AROM at 19:47 on 10/8, clear fluids. Baby born vigorous and crying spontaneously. Baby is SGA. WDSS. Apgars 9/9. EOS 0.02. Mom plans to breastfeed and bottle feed, would like HepB.

## 2019-10-09 ENCOUNTER — TRANSCRIPTION ENCOUNTER (OUTPATIENT)
Age: 29
End: 2019-10-09

## 2019-10-09 LAB
BASOPHILS # BLD AUTO: 0.03 K/UL — SIGNIFICANT CHANGE UP (ref 0–0.2)
BASOPHILS NFR BLD AUTO: 0.3 % — SIGNIFICANT CHANGE UP (ref 0–2)
EOSINOPHIL # BLD AUTO: 0.03 K/UL — SIGNIFICANT CHANGE UP (ref 0–0.5)
EOSINOPHIL NFR BLD AUTO: 0.3 % — SIGNIFICANT CHANGE UP (ref 0–6)
HCT VFR BLD CALC: 32.2 % — LOW (ref 34.5–45)
HGB BLD-MCNC: 10.6 G/DL — LOW (ref 11.5–15.5)
IMM GRANULOCYTES NFR BLD AUTO: 0.5 % — SIGNIFICANT CHANGE UP (ref 0–1.5)
LYMPHOCYTES # BLD AUTO: 1.15 K/UL — SIGNIFICANT CHANGE UP (ref 1–3.3)
LYMPHOCYTES # BLD AUTO: 11.7 % — LOW (ref 13–44)
MCHC RBC-ENTMCNC: 29.4 PG — SIGNIFICANT CHANGE UP (ref 27–34)
MCHC RBC-ENTMCNC: 32.9 % — SIGNIFICANT CHANGE UP (ref 32–36)
MCV RBC AUTO: 89.2 FL — SIGNIFICANT CHANGE UP (ref 80–100)
MONOCYTES # BLD AUTO: 0.78 K/UL — SIGNIFICANT CHANGE UP (ref 0–0.9)
MONOCYTES NFR BLD AUTO: 8 % — SIGNIFICANT CHANGE UP (ref 2–14)
NEUTROPHILS # BLD AUTO: 7.77 K/UL — HIGH (ref 1.8–7.4)
NEUTROPHILS NFR BLD AUTO: 79.2 % — HIGH (ref 43–77)
NRBC # FLD: 0 K/UL — SIGNIFICANT CHANGE UP (ref 0–0)
PLATELET # BLD AUTO: 119 K/UL — LOW (ref 150–400)
PMV BLD: 13.7 FL — HIGH (ref 7–13)
RBC # BLD: 3.61 M/UL — LOW (ref 3.8–5.2)
RBC # FLD: 14.7 % — HIGH (ref 10.3–14.5)
RUBV IGG SER-ACNC: 11.6 INDEX — SIGNIFICANT CHANGE UP
RUBV IGG SER-IMP: POSITIVE — SIGNIFICANT CHANGE UP
T PALLIDUM AB TITR SER: NEGATIVE — SIGNIFICANT CHANGE UP
WBC # BLD: 9.81 K/UL — SIGNIFICANT CHANGE UP (ref 3.8–10.5)
WBC # FLD AUTO: 9.81 K/UL — SIGNIFICANT CHANGE UP (ref 3.8–10.5)

## 2019-10-09 RX ORDER — OXYCODONE HYDROCHLORIDE 5 MG/1
5 TABLET ORAL
Refills: 0 | Status: DISCONTINUED | OUTPATIENT
Start: 2019-10-09 | End: 2019-10-09

## 2019-10-09 RX ORDER — ONDANSETRON 8 MG/1
4 TABLET, FILM COATED ORAL EVERY 6 HOURS
Refills: 0 | Status: DISCONTINUED | OUTPATIENT
Start: 2019-10-09 | End: 2019-10-09

## 2019-10-09 RX ORDER — NALOXONE HYDROCHLORIDE 4 MG/.1ML
0.1 SPRAY NASAL
Refills: 0 | Status: DISCONTINUED | OUTPATIENT
Start: 2019-10-09 | End: 2019-10-09

## 2019-10-09 RX ORDER — IBUPROFEN 200 MG
600 TABLET ORAL EVERY 6 HOURS
Refills: 0 | Status: DISCONTINUED | OUTPATIENT
Start: 2019-10-09 | End: 2019-10-11

## 2019-10-09 RX ORDER — OXYCODONE HYDROCHLORIDE 5 MG/1
10 TABLET ORAL
Refills: 0 | Status: DISCONTINUED | OUTPATIENT
Start: 2019-10-09 | End: 2019-10-09

## 2019-10-09 RX ORDER — HYDROMORPHONE HYDROCHLORIDE 2 MG/ML
1 INJECTION INTRAMUSCULAR; INTRAVENOUS; SUBCUTANEOUS
Refills: 0 | Status: DISCONTINUED | OUTPATIENT
Start: 2019-10-09 | End: 2019-10-09

## 2019-10-09 RX ORDER — BUTORPHANOL TARTRATE 2 MG/ML
0.25 INJECTION, SOLUTION INTRAMUSCULAR; INTRAVENOUS EVERY 6 HOURS
Refills: 0 | Status: DISCONTINUED | OUTPATIENT
Start: 2019-10-09 | End: 2019-10-09

## 2019-10-09 RX ADMIN — Medication 600 MILLIGRAM(S): at 14:00

## 2019-10-09 RX ADMIN — Medication 25 MILLIGRAM(S): at 07:44

## 2019-10-09 RX ADMIN — Medication 600 MILLIGRAM(S): at 13:22

## 2019-10-09 RX ADMIN — Medication 600 MILLIGRAM(S): at 20:38

## 2019-10-09 RX ADMIN — Medication 600 MILLIGRAM(S): at 21:38

## 2019-10-09 RX ADMIN — Medication 25 MILLIGRAM(S): at 20:37

## 2019-10-09 RX ADMIN — HEPARIN SODIUM 5000 UNIT(S): 5000 INJECTION INTRAVENOUS; SUBCUTANEOUS at 17:55

## 2019-10-09 RX ADMIN — HEPARIN SODIUM 5000 UNIT(S): 5000 INJECTION INTRAVENOUS; SUBCUTANEOUS at 03:29

## 2019-10-09 RX ADMIN — Medication 30 MILLIGRAM(S): at 04:30

## 2019-10-09 RX ADMIN — Medication 100 MILLIGRAM(S): at 13:22

## 2019-10-09 RX ADMIN — Medication 30 MILLIGRAM(S): at 03:28

## 2019-10-09 NOTE — BRIEF OPERATIVE NOTE - OPERATION/FINDINGS
repeat LTCS for labor, uncomplicated  viable female infant, vertex presentation, Apgars 9/9, cord gasses sent  Grossly normal fallopian tubes, uterus, and ovaries  Fibrillar placed over hysterotomy site repeat LTCS for labor, uncomplicated  viable female infant, vertex presentation, Apgars 9/9, cord gasses sent  Paragard IUD placed, Lot#[id number]  Grossly normal fallopian tubes, uterus, and ovaries  Fibrillar placed over hysterotomy site

## 2019-10-09 NOTE — DISCHARGE NOTE OB - PATIENT PORTAL LINK FT
You can access the FollowMyHealth Patient Portal offered by [facility] by registering at the following website: [email or web address]. By joining [employer]’s FollowMyHealth portal, you will also be able to view your health information using other applications (apps) compatible with our system.

## 2019-10-09 NOTE — DISCHARGE NOTE OB - MEDICATION SUMMARY - MEDICATIONS TO TAKE
I will START or STAY ON the medications listed below when I get home from the hospital:    acetaminophen 325 mg oral tablet  -- 3 tab(s) by mouth every 6 hours  -- Indication: For  delivery delivered    ibuprofen 600 mg oral tablet  -- 1 tab(s) by mouth every 6 hours  -- Indication: For  delivery delivered    oxyCODONE 5 mg oral tablet  -- 1 tab(s) by mouth every 6 hours, As Needed -Moderate to Severe Pain (4-10) MDD:4  -- Indication: For  delivery delivered

## 2019-10-09 NOTE — DISCHARGE NOTE OB - HOSPITAL COURSE
Hospital Course:  Patient underwent an uncomplicated X**LTCS for [indication] (please see operative note for details of procedure.     EBL:  Hct:    Patient's post operative course was unremarkable and she remained hemodynamically stable and afebrile throughout. Upon discharge on POD3, the patient is ambulating and voiding spontaneously, tolerated oral itnake, pain was well controlled with oral medications and vital signs were stable. Patient underwent an uncomplicated rLTCS with postpartum Paraguard placement (please see operative note for details of procedure).     EBL:800  Hct:40.7->33.2    Patient's post operative course was unremarkable and she remained hemodynamically stable and afebrile throughout. Upon discharge on POD3, the patient is ambulating and voiding spontaneously, tolerated oral itnake, pain was well controlled with oral medications and vital signs were stable.

## 2019-10-09 NOTE — DISCHARGE NOTE OB - CARE PLAN
Goal:	full recovery Goal:	full recovery  Assessment and plan of treatment:	Instructions:  Make your follow-up appointment with your doctor as ordered.   No heavy lifting, driving, or strenuous activity for 6 weeks.   Nothing per vagina such as tampons, intercourse, douches or tub baths for 6 weeks or until you see your doctor.   Call your doctor with any signs and symptoms of infection such as fever, chills, nausea or vomiting. Call your doctor with redness or swelling at the incision site, fluid leakage or wound separation. Call your doctor if you're unable to tolerate food, increase in vaginal bleeding, or have difficulty urinating. Call your doctor if you have pain that is not releived by your prescribed medications. Notify your doctor with any of concerns.    HTN/PEC Pt?  Given a perscription for a blood pressure cuff to monitor your blood pressure at home. Call your doctor if your blood pressure is greater than or equal to 160 systolic (top number) or 110 diastolic (bottom number), or you experience a headache unreleived by OTC medications, blurred vision, or difficulty breathing.    Follow up:    [facility]  Please f/u in 2 weeks for an incision check and for a postpartum appointment in 4-6 weeks @Ambulatory Clinic Unit, [facility], Oncology Building, 3rd floor. Please call the office for an appointment ([phone]) Principal Discharge DX:	 delivery delivered  Goal:	full recovery  Assessment and plan of treatment:	Instructions:  Make your follow-up appointment with your doctor as ordered.   No heavy lifting, driving, or strenuous activity for 6 weeks.   Nothing per vagina such as tampons, intercourse, douches or tub baths for 6 weeks or until you see your doctor.   Call your doctor with any signs and symptoms of infection such as fever, chills, nausea or vomiting. Call your doctor with redness or swelling at the incision site, fluid leakage or wound separation. Call your doctor if you're unable to tolerate food, increase in vaginal bleeding, or have difficulty urinating. Call your doctor if you have pain that is not releived by your prescribed medications. Notify your doctor with any of concerns.    HTN/PEC Pt?  Given a perscription for a blood pressure cuff to monitor your blood pressure at home. Call your doctor if your blood pressure is greater than or equal to 160 systolic (top number) or 110 diastolic (bottom number), or you experience a headache unreleived by OTC medications, blurred vision, or difficulty breathing.    Follow up:    [facility]  Please f/u in 2 weeks for an incision check and for a postpartum appointment in 4-6 weeks @Ambulatory Clinic Unit, [facility], Oncology Building, 3rd floor. Please call the office for an appointment ([phone])

## 2019-10-09 NOTE — DISCHARGE NOTE OB - PROVIDER TOKENS
FREE:[LAST:[OB/GYN],PHONE:[phone]],FAX:[(   )    -],ADDRESS:[Ambulatory Clinic Unit, [facility] Oncology building 3rd Floor]]

## 2019-10-09 NOTE — OB PROVIDER DELIVERY SUMMARY - NSPROVIDERDELIVERYNOTE_OBGYN_ALL_OB_FT
repeat LTCS for labor, uncomplicated  viable female infant, vertex presentation, Apgars 9/9, cord gasses sent  Grossly normal fallopian tubes, uterus, and ovaries  Fibrillar placed over hysterotomy site    EBL: 800  IVF: 2000  UOP: 120    C. Kimber PGY2  w/ Dr. Freitas repeat LTCS for labor, uncomplicated  viable female infant, vertex presentation, Apgars 9/9, cord gasses sent  Grossly normal fallopian tubes, uterus, and ovaries  Paragard IUD placed, Lot#[id number]  Fibrillar placed over hysterotomy site    EBL: 800  IVF: 2000  UOP: 120    WALE Quiroga PGY2  w/ Dr. Freitas

## 2019-10-09 NOTE — PROGRESS NOTE ADULT - SUBJECTIVE AND OBJECTIVE BOX
OB Progress Note:  Delivery, POD#1    S: 29yo POD#1 s/p rLTCS uncomplicated with post partum Paraguard placement. Her pain is well controlled. She is tolerating a regular diet and passing flatus. Denies N/V. Denies CP/SOB/lightheadedness/dizziness.   She is ambulating without difficulty.   Voiding spontaneously.     O:   Vital Signs Last 24 Hrs  T(C): 36.8 (09 Oct 2019 05:26), Max: 37 (08 Oct 2019 11:29)  T(F): 98.3 (09 Oct 2019 05:26), Max: 98.6 (08 Oct 2019 11:29)  HR: 71 (09 Oct 2019 05:26) (69 - 89)  BP: 92/52 (09 Oct 2019 05:26) (92/52 - 115/77)  BP(mean): 69 (09 Oct 2019 00:30) (64 - 78)  RR: 16 (09 Oct 2019 05:26) (14 - 20)  SpO2: 100% (09 Oct 2019 05:26) (97% - 100%)    Labs:  Blood type: A Positive  Rubella IgG: RPR:                           12.6   9.86 >-----------< 147<L>    ( 10-08 @ 17:01 )             40.7                  PE:  General: NAD  Abdomen: Mildly distended, appropriately tender, incision c/d/i.  Extremities: No erythema, no pitting edema        Louis Sullivan PGY1

## 2019-10-09 NOTE — DISCHARGE NOTE OB - PLAN OF CARE
full recovery Instructions:  Make your follow-up appointment with your doctor as ordered.   No heavy lifting, driving, or strenuous activity for 6 weeks.   Nothing per vagina such as tampons, intercourse, douches or tub baths for 6 weeks or until you see your doctor.   Call your doctor with any signs and symptoms of infection such as fever, chills, nausea or vomiting. Call your doctor with redness or swelling at the incision site, fluid leakage or wound separation. Call your doctor if you're unable to tolerate food, increase in vaginal bleeding, or have difficulty urinating. Call your doctor if you have pain that is not releived by your prescribed medications. Notify your doctor with any of concerns.    HTN/PEC Pt?  Given a perscription for a blood pressure cuff to monitor your blood pressure at home. Call your doctor if your blood pressure is greater than or equal to 160 systolic (top number) or 110 diastolic (bottom number), or you experience a headache unreleived by OTC medications, blurred vision, or difficulty breathing.    Follow up:    [facility]  Please f/u in 2 weeks for an incision check and for a postpartum appointment in 4-6 weeks @Ambulatory Clinic Unit, [facility], [facility], 3rd floor. Please call the office for an appointment ([phone])

## 2019-10-09 NOTE — PROGRESS NOTE ADULT - ATTENDING COMMENTS
Associate Chief of L&D    This patient was unfamiliar to me until I have met her for the first time today.  She was delivered by Dr Sanz and had a PP paraguard placed.    S: Patient is doing well without complaints. Tolerates regular diet.  Ambulating without difficulty. Denies N/V. Voiding freely. Passing flatus. Pain well controlled with oral pain medications. She is breastfeeding.    O: Vital Signs Last 24 Hrs  T(C): 37.4 (09 Oct 2019 08:27), Max: 37.4 (09 Oct 2019 08:27)  T(F): 99.4 (09 Oct 2019 08:27), Max: 99.4 (09 Oct 2019 08:27)  HR: 71 (09 Oct 2019 05:26) (69 - 89)  BP: 92/52 (09 Oct 2019 05:26) (92/52 - 115/77)  BP(mean): 69 (09 Oct 2019 00:30) (64 - 78)  RR: 16 (09 Oct 2019 05:26) (14 - 20)  SpO2: 100% (09 Oct 2019 05:26) (97% - 100%)    Labs:                        10.6   9.81  )-----------( 119      ( 09 Oct 2019 06:05 )             32.2       Physical Exam:  General: NAD  Abdomen: soft, non-tender, non-distended, +BS Fundus firm  INC: C/D/I   Vaginal: Lochia scant  Extremities:trace     butorphanol Injectable 0.25 milliGRAM(s) IV Push every 6 hours PRN  diphenhydrAMINE 25 milliGRAM(s) Oral every 6 hours PRN  diphtheria/tetanus/pertussis (acellular) Vaccine (ADAcel) 0.5 milliLiter(s) IntraMuscular once  docusate sodium 100 milliGRAM(s) Oral two times a day PRN  glycerin Suppository - Adult 1 Suppository(s) Rectal at bedtime PRN  heparin  Injectable 5000 Unit(s) SubCutaneous every 12 hours  HYDROmorphone  Injectable 1 milliGRAM(s) IV Push every 3 hours PRN  ibuprofen  Tablet. 600 milliGRAM(s) Oral every 6 hours  ketorolac   Injectable 30 milliGRAM(s) IV Push every 6 hours  lactated ringers. 1000 milliLiter(s) IV Continuous <Continuous>  lactated ringers. 1000 milliLiter(s) IV Continuous <Continuous>  lanolin Ointment 1 Application(s) Topical every 6 hours PRN  magnesium hydroxide Suspension 30 milliLiter(s) Oral two times a day PRN  naloxone Injectable 0.1 milliGRAM(s) IV Push every 3 minutes PRN  ondansetron Injectable 4 milliGRAM(s) IV Push every 6 hours PRN  oxyCODONE    IR 5 milliGRAM(s) Oral every 3 hours PRN  oxyCODONE    IR 5 milliGRAM(s) Oral once PRN  oxyCODONE    IR 5 milliGRAM(s) Oral every 3 hours PRN  oxyCODONE    IR 10 milliGRAM(s) Oral every 3 hours PRN  oxytocin Infusion 333.333 milliUNIT(s)/Min IV Continuous <Continuous>  simethicone 80 milliGRAM(s) Chew every 4 hours PRN      A/P: s/p Repeat c section with Paraguard placement  -Continue routine PP care  - increase ambulation  -Continue the current pain medication  -Encourage regular diet  -DVT ppx: SCDs only when not ambulating      Meri Mac MD

## 2019-10-09 NOTE — BRIEF OPERATIVE NOTE - NSICDXBRIEFPROCEDURE_GEN_ALL_CORE_FT
PROCEDURES:  Repeat low transverse  section 09-Oct-2019 00:11:54  Lolis Quiroga PROCEDURES:  Insertion of ParaGard IUD 09-Oct-2019 00:18:35  Lolis Quiroga  Repeat low transverse  section 09-Oct-2019 00:11:54  Lolis Quiroga

## 2019-10-09 NOTE — DISCHARGE NOTE OB - DO NOT DRIVE OR OPERATE HEAVY MACHINERY WHEN TAKING NARCOTICS/PRESCRIPTION PAIN MEDICATION THAT CAN CHANGE YOUR MENTAL STATE OR CAUSE DROWSINESS
Progress Notes by Kiana Kahn PA-C at 05/01/18 08:45 AM     Author:  Kiana Kahn PA-C Service:  (none) Author Type:  Physician Assistant     Filed:  05/01/18 09:10 AM Encounter Date:  5/1/2018 Status:  Signed     :  Kiana Kahn PA-C (Physician Assistant)            PEDIATRIC ILLNESS VISIT         5/1/2018    Roomed by: HARRISON Goodwin 8:45 AM      SUBJECTIVE[AV1.1T]:[JT1.1M]  Accompanied by:[AV1.1T]  Mother[AV1.1M]  Karthikeyan is a 6 year old male who is complaining of[AV1.1T] right ear pain[AV1.1M].  Present for[AV1.1T] a day[AV1.1M] and is[AV1.1T] worsening[AV1.1M].  Present treatments include -[AV1.1T] Nsaid's (Advil, Motrin, etc.)[AV1.1M].   Previous medical contacts for the problem -[AV1.1T] none[AV1.1M]. Pain woke him up last night several times.[JT1.2M]    Symptoms:  Fever:[AV1.1T]     No elevation of temperature[AV1.1M]  General:[AV1.1T] No problems, irritability, fussiness, crying, or lethargy[AV1.1M]    ROS  All other ROS negative unless indicated otherwise above.    Allergies:  Review of patient's allergies indicates no known allergies.    No current outpatient prescriptions on file.     Family histor[AV1.1T]y:[JT1.1M] No other family members have acute illnesses[AV1.1M]    Social histor[AV1.1T]y:[JT1.1M] Attends school[AV1.1M]    OBJECTIVE:   Physical exam  Pulse 81  Temp 97.1 °F (36.2 °C) (Tympanic)   Wt 61 lb (27.7 kg)  SpO2 100%  GENERAL:[AV1.1T] Normal- alert and no distress noted   EARS: Rt. Ear Exam: TM: BULGING and ERYTHEMATOUS  Normal Lt. external ear, canal and TM  NOSE: No swelling or drainage..  MOUTH: No abnormalities of tongue or mucosal membranes.  THROAT: No redness or lesions.  NECK: No masses, swelling or tenderness. No abnormal lymph nodes.  CHEST: Lungs are clear to auscultation and no retractions.    ASS[JT1.2M]ESSMENT/PLAN[AV1.1T]:[JT1.1M]  Otitis:.................................Oral antibiotic, Ibuprofen or Tylenol for pain and temperature, Call if worsening or not  improving in 24 hrs. and Recheck not necessary if no problems after two weeks[JT1.2M]  Medication changes:[AV1.1T] Yes.  Was advised on side effects and potential interactions of the new medication(s).  Was advised on the risks of not taking medication(s) or adhering to the medication schedule.[JT1.2M]  Immunizations given today?[AV1.1T] No.[JT1.2M]  See Orders:  Instructed to call if the problem worsens or does not improve within the next 24 to 48 hours.  Schedule follow-up:[AV1.1T] prn[JT1.2M]  Electronically Signed by:    Kiana Kahn PA-C , 5/1/2018  Provider billing:[JT1.1T] Supervising Provider: SELINA[JT1.1M]       Revision History        User Key Date/Time User Provider Type Action    > JT1.2 05/01/18 09:10 AM Kiana Kahn PA-C Physician Assistant Sign     JT1.1 05/01/18 08:59 AM Kiana Kahn PA-C Physician Assistant      AV1.1 05/01/18 08:46 AM Zabrina Ruvalcaba NCMA Certified Medical Assistant Sign at close encounter    M - Manual, T - Template             Statement Selected

## 2019-10-09 NOTE — PROGRESS NOTE ADULT - SUBJECTIVE AND OBJECTIVE BOX
Day ___1 of Anesthesia Pain Management Service    SUBJECTIVE:  Pain Scale Score	At rest: __none_ 	With Activity: __mild_ 	[x ] Refer to charted pain scores    THERAPY:    s/p _____0.1__ mg PF morphine  OBJECTIVE:    Sedation Score:	[ x] Alert	[ ] Drowsy	[ ] Arousable	[ ] Asleep	[ ] Unresponsive    Side Effects:	[x ] None	[ ] Nausea	[ ] Vomiting	[ ] Pruritus  		  [ ] Weakness		[ ] Numbness	[ ] Other:    Vital Signs Last 24 Hrs  T(C): 36.8 (09 Oct 2019 05:26), Max: 37 (08 Oct 2019 11:29)  T(F): 98.3 (09 Oct 2019 05:26), Max: 98.6 (08 Oct 2019 11:29)  HR: 71 (09 Oct 2019 05:26) (69 - 89)  BP: 92/52 (09 Oct 2019 05:26) (92/52 - 115/77)  BP(mean): 69 (09 Oct 2019 00:30) (64 - 78)  RR: 16 (09 Oct 2019 05:26) (14 - 20)  SpO2: 100% (09 Oct 2019 05:26) (97% - 100%)    ASSESSMENT/ PLAN  [x ] Patient transitioned to prn analgesics  [ x] Pain management per primary service, pain service to sign off   [ x]Documentation and Verification of current medications     Comments: No anesthetic complications.

## 2019-10-09 NOTE — DISCHARGE NOTE OB - MATERIALS PROVIDED
Guide to Postpartum Care/Birth Certificate Instructions/Breastfeeding Mother’s Support Group Information

## 2019-10-09 NOTE — PROGRESS NOTE ADULT - PROBLEM SELECTOR PLAN 1
- Continue regular diet.  - Increase ambulation.  - Continue motrin, tylenol, oxycodone PRN for pain control.  - F/u AM CBC

## 2019-10-09 NOTE — DISCHARGE NOTE OB - CARE PROVIDER_API CALL
OB/GYN,   Ambulatory Clinic Unit, [facility] 3rd Floor  Phone: [phone]  Fax: (   )    -  Follow Up Time:

## 2019-10-10 RX ORDER — KETOROLAC TROMETHAMINE 30 MG/ML
15 SYRINGE (ML) INJECTION ONCE
Refills: 0 | Status: DISCONTINUED | OUTPATIENT
Start: 2019-10-10 | End: 2019-10-10

## 2019-10-10 RX ORDER — OXYCODONE HYDROCHLORIDE 5 MG/1
5 TABLET ORAL
Refills: 0 | Status: DISCONTINUED | OUTPATIENT
Start: 2019-10-10 | End: 2019-10-11

## 2019-10-10 RX ORDER — ACETAMINOPHEN 500 MG
975 TABLET ORAL EVERY 6 HOURS
Refills: 0 | Status: DISCONTINUED | OUTPATIENT
Start: 2019-10-10 | End: 2019-10-11

## 2019-10-10 RX ORDER — KETOROLAC TROMETHAMINE 30 MG/ML
30 SYRINGE (ML) INJECTION ONCE
Refills: 0 | Status: DISCONTINUED | OUTPATIENT
Start: 2019-10-10 | End: 2019-10-10

## 2019-10-10 RX ADMIN — SIMETHICONE 80 MILLIGRAM(S): 80 TABLET, CHEWABLE ORAL at 18:11

## 2019-10-10 RX ADMIN — Medication 600 MILLIGRAM(S): at 05:23

## 2019-10-10 RX ADMIN — OXYCODONE HYDROCHLORIDE 5 MILLIGRAM(S): 5 TABLET ORAL at 15:54

## 2019-10-10 RX ADMIN — OXYCODONE HYDROCHLORIDE 5 MILLIGRAM(S): 5 TABLET ORAL at 21:39

## 2019-10-10 RX ADMIN — Medication 600 MILLIGRAM(S): at 18:10

## 2019-10-10 RX ADMIN — Medication 600 MILLIGRAM(S): at 06:03

## 2019-10-10 RX ADMIN — Medication 100 MILLIGRAM(S): at 07:49

## 2019-10-10 RX ADMIN — Medication 975 MILLIGRAM(S): at 15:55

## 2019-10-10 RX ADMIN — Medication 100 MILLIGRAM(S): at 18:10

## 2019-10-10 RX ADMIN — SIMETHICONE 80 MILLIGRAM(S): 80 TABLET, CHEWABLE ORAL at 07:49

## 2019-10-10 RX ADMIN — Medication 975 MILLIGRAM(S): at 08:08

## 2019-10-10 RX ADMIN — Medication 30 MILLIGRAM(S): at 12:21

## 2019-10-10 RX ADMIN — Medication 30 MILLIGRAM(S): at 12:35

## 2019-10-10 RX ADMIN — OXYCODONE HYDROCHLORIDE 5 MILLIGRAM(S): 5 TABLET ORAL at 16:30

## 2019-10-10 RX ADMIN — HEPARIN SODIUM 5000 UNIT(S): 5000 INJECTION INTRAVENOUS; SUBCUTANEOUS at 05:23

## 2019-10-10 RX ADMIN — Medication 975 MILLIGRAM(S): at 09:00

## 2019-10-10 RX ADMIN — HEPARIN SODIUM 5000 UNIT(S): 5000 INJECTION INTRAVENOUS; SUBCUTANEOUS at 18:10

## 2019-10-10 RX ADMIN — Medication 975 MILLIGRAM(S): at 16:30

## 2019-10-10 RX ADMIN — OXYCODONE HYDROCHLORIDE 5 MILLIGRAM(S): 5 TABLET ORAL at 22:09

## 2019-10-10 RX ADMIN — MAGNESIUM HYDROXIDE 30 MILLILITER(S): 400 TABLET, CHEWABLE ORAL at 19:09

## 2019-10-10 NOTE — PROGRESS NOTE ADULT - ATTENDING COMMENTS
Associate Chief of L&D    S: Patient c/o of abdominal pain..  She is s/p C section and IUD placement      O: Vital Signs Last 24 Hrs  T(C): 36.6 (10 Oct 2019 05:49), Max: 37 (09 Oct 2019 14:00)  T(F): 97.8 (10 Oct 2019 05:49), Max: 98.6 (09 Oct 2019 14:00)  HR: 81 (10 Oct 2019 05:49) (77 - 97)  BP: 97/58 (10 Oct 2019 05:49) (90/52 - 97/58)  BP(mean): --  RR: 16 (10 Oct 2019 05:49) (1 - 18)  SpO2: 99% (10 Oct 2019 05:49) (98% - 100%)    Labs:                        10.6   9.81  )-----------( 119      ( 09 Oct 2019 06:05 )             32.2       Physical Exam:  General: NAD  Abdomen: soft, non-tender, non-distended, +BS Fundus firm   Vaginal: Lochia wnl  [place]:  Extremities:     acetaminophen   Tablet .. 975 milliGRAM(s) Oral every 6 hours  diphenhydrAMINE 25 milliGRAM(s) Oral every 6 hours PRN  diphtheria/tetanus/pertussis (acellular) Vaccine (ADAcel) 0.5 milliLiter(s) IntraMuscular once  docusate sodium 100 milliGRAM(s) Oral two times a day PRN  glycerin Suppository - Adult 1 Suppository(s) Rectal at bedtime PRN  heparin  Injectable 5000 Unit(s) SubCutaneous every 12 hours  ibuprofen  Tablet. 600 milliGRAM(s) Oral every 6 hours  lanolin Ointment 1 Application(s) Topical every 6 hours PRN  magnesium hydroxide Suspension 30 milliLiter(s) Oral two times a day PRN  oxyCODONE    IR 5 milliGRAM(s) Oral every 3 hours PRN  oxyCODONE    IR 5 milliGRAM(s) Oral once PRN  simethicone 80 milliGRAM(s) Chew every 4 hours PRN      A/P: POD#2 C Section and placment of IUD. with abdominal pain today.  -Continue routine PP care  - Patient received analgesia if pain persist the resident will follow up with an U/S to make sure that the IUD did not move  -Continue the current pain medication  -Encourage regular diet  -DVT ppx: SCDs only when not ambulating      Meri Mac MD

## 2019-10-10 NOTE — LACTATION INITIAL EVALUATION - LACTATION INTERVENTIONS
initiate hand expression routine/assisted with deep latch and positioning . discussed  signs  of  effective  feeding and  swallowing.  discussed  compression at  breast when  nbn  stops  drinking  and  is  still sucking.  instructed  to offer both  breast at a feeding ,feed on cue and safe  skin to skin. .reviewed  risks  of  formula feeding  ./initiate skin to skin

## 2019-10-10 NOTE — PROGRESS NOTE ADULT - PROBLEM SELECTOR PLAN 1
- Continue regular diet.  - Increase ambulation, SCDs and heparin for DVT prophylaxis   - Continue motrin, tylenol, oxycodone PRN for pain control.     Carlos Eduardo العلي PGY1

## 2019-10-10 NOTE — LACTATION INITIAL EVALUATION - INTERVENTION OUTCOME
verbalizes understanding/nbn demonstrated  deep latch and  performed  with sucking and swallowing  noted ,  reviewed  breastfeeding  log  and daily  nbn  goals..  offered assistance as needed.

## 2019-10-10 NOTE — PROGRESS NOTE ADULT - SUBJECTIVE AND OBJECTIVE BOX
OB Progress Note: LTCS, POD#2    S: 27yo POD#2 s/p LTCS with insertion of Paragard IUD in the OR. Pain is well controlled. She is tolerating a regular diet and passing flatus. She is voiding spontaneously, and ambulating without difficulty. Denies CP/SOB. Denies lightheadedness/dizziness. Denies N/V. Denies any heavy vaginal bleeding or passage of large clots.     O:  Vitals:  Vital Signs Last 24 Hrs  T(C): 36.6 (10 Oct 2019 05:49), Max: 37.4 (09 Oct 2019 08:27)  T(F): 97.8 (10 Oct 2019 05:49), Max: 99.4 (09 Oct 2019 08:27)  HR: 81 (10 Oct 2019 05:49) (77 - 97)  BP: 97/58 (10 Oct 2019 05:49) (90/52 - 97/58)  BP(mean): --  RR: 16 (10 Oct 2019 05:49) (1 - 18)  SpO2: 99% (10 Oct 2019 05:49) (98% - 100%)    MEDICATIONS  (STANDING):  diphtheria/tetanus/pertussis (acellular) Vaccine (ADAcel) 0.5 milliLiter(s) IntraMuscular once  heparin  Injectable 5000 Unit(s) SubCutaneous every 12 hours  ibuprofen  Tablet. 600 milliGRAM(s) Oral every 6 hours      MEDICATIONS  (PRN):  diphenhydrAMINE 25 milliGRAM(s) Oral every 6 hours PRN Itching  docusate sodium 100 milliGRAM(s) Oral two times a day PRN Stool softening  glycerin Suppository - Adult 1 Suppository(s) Rectal at bedtime PRN Constipation  lanolin Ointment 1 Application(s) Topical every 6 hours PRN Sore Nipples  magnesium hydroxide Suspension 30 milliLiter(s) Oral two times a day PRN Constipation  oxyCODONE    IR 5 milliGRAM(s) Oral every 3 hours PRN Moderate to Severe Pain (4-10)  oxyCODONE    IR 5 milliGRAM(s) Oral once PRN Moderate to Severe Pain (4-10)  simethicone 80 milliGRAM(s) Chew every 4 hours PRN Gas      Labs:  Blood type: A Positive  Rubella IgG: Positive (10-08 @ 17:13)  RPR: Negative                          10.6<L>   9.81 >-----------< 119<L>    ( 10-09 @ 06:05 )             32.2<L>                        12.6   9.86 >-----------< 147<L>    ( 10-08 @ 17:01 )             40.7          PE:  General: NAD  Abdomen: Soft, appropriately tender, incision c/d/i.  Extremities: No erythema, no pitting edema

## 2019-10-10 NOTE — CHART NOTE - NSCHARTNOTEFT_GEN_A_CORE
R1 Note    Patient assessed at bedside for abdominal pain since delivery.  Patient is s/p rLTCS and paraguard placement.  Patient is ambulating well, and describes a dull cramping pain which is 7/10 when she woke up this morning, and 6/10 after she received motrin.  She denies any sharp or migrating pains.  Denies N/V.  Denies passage of clots per vagina.  Is voiding spontaneously.  Passing gas since Maalox administration this morning.    VS  T(C): 36.6 (10-10-19 @ 05:49)  HR: 81 (10-10-19 @ 05:49)  BP: 97/58 (10-10-19 @ 05:49)  RR: 16 (10-10-19 @ 05:49)  SpO2: 99% (10-10-19 @ 05:49)    PE:  General: NAD  Abd: Diffuse tenderness to medium palpation in lower segment of abdomen and suprapubic area, no guarding, no rebound tenderness    Plan:  - c/w ambulation  - Ketorolac x1 IV for pain control  - will reassess this afternoon      d/w Dr. Wilbur Sullivan

## 2019-10-11 VITALS
RESPIRATION RATE: 17 BRPM | OXYGEN SATURATION: 99 % | TEMPERATURE: 97 F | DIASTOLIC BLOOD PRESSURE: 79 MMHG | SYSTOLIC BLOOD PRESSURE: 99 MMHG | HEART RATE: 98 BPM

## 2019-10-11 RX ORDER — OXYCODONE HYDROCHLORIDE 5 MG/1
1 TABLET ORAL
Qty: 12 | Refills: 0
Start: 2019-10-11 | End: 2019-10-13

## 2019-10-11 RX ORDER — ACETAMINOPHEN 500 MG
3 TABLET ORAL
Qty: 0 | Refills: 0 | DISCHARGE
Start: 2019-10-11

## 2019-10-11 RX ORDER — IBUPROFEN 200 MG
1 TABLET ORAL
Qty: 0 | Refills: 0 | DISCHARGE
Start: 2019-10-11

## 2019-10-11 RX ORDER — DOCUSATE SODIUM 100 MG
1 CAPSULE ORAL
Qty: 0 | Refills: 0 | DISCHARGE

## 2019-10-11 RX ORDER — FAMOTIDINE 10 MG/ML
1 INJECTION INTRAVENOUS
Qty: 0 | Refills: 0 | DISCHARGE

## 2019-10-11 RX ADMIN — Medication 100 MILLIGRAM(S): at 15:07

## 2019-10-11 RX ADMIN — Medication 600 MILLIGRAM(S): at 15:07

## 2019-10-11 RX ADMIN — Medication 975 MILLIGRAM(S): at 15:07

## 2019-10-11 RX ADMIN — OXYCODONE HYDROCHLORIDE 5 MILLIGRAM(S): 5 TABLET ORAL at 05:03

## 2019-10-11 RX ADMIN — Medication 600 MILLIGRAM(S): at 16:00

## 2019-10-11 RX ADMIN — Medication 975 MILLIGRAM(S): at 09:45

## 2019-10-11 RX ADMIN — Medication 600 MILLIGRAM(S): at 09:45

## 2019-10-11 RX ADMIN — Medication 600 MILLIGRAM(S): at 08:57

## 2019-10-11 RX ADMIN — OXYCODONE HYDROCHLORIDE 5 MILLIGRAM(S): 5 TABLET ORAL at 05:31

## 2019-10-11 RX ADMIN — HEPARIN SODIUM 5000 UNIT(S): 5000 INJECTION INTRAVENOUS; SUBCUTANEOUS at 05:03

## 2019-10-11 RX ADMIN — SIMETHICONE 80 MILLIGRAM(S): 80 TABLET, CHEWABLE ORAL at 15:07

## 2019-10-11 RX ADMIN — Medication 975 MILLIGRAM(S): at 08:56

## 2019-10-11 RX ADMIN — Medication 975 MILLIGRAM(S): at 16:00

## 2019-10-11 NOTE — PROGRESS NOTE ADULT - SUBJECTIVE AND OBJECTIVE BOX
OB Postpartum Note:  Delivery, POD#3    S: 29yo POD#3 s/p rLTCS. The patient feels well.  Patient is having abdominal cramping and pain.  Pain is worse in morning and relieved some with tylenol and motrin and oxycodone.  She rates pain as ranging from 6-8/10. She is tolerating a regular diet and passing flatus. She is voiding spontaneously, and ambulating without difficulty. Denies CP/SOB. Denies lightheadedness/dizziness. Denies N/V.    O:  Vitals:  Vital Signs Last 24 Hrs  T(C): 36.3 (11 Oct 2019 05:30), Max: 36.6 (10 Oct 2019 14:00)  T(F): 97.4 (11 Oct 2019 05:30), Max: 97.9 (10 Oct 2019 14:00)  HR: 98 (11 Oct 2019 05:30) (85 - 98)  BP: 99/79 (11 Oct 2019 05:30) (97/62 - 99/79)  BP(mean): --  RR: 17 (11 Oct 2019 05:30) (16 - 17)  SpO2: 99% (11 Oct 2019 05:30) (98% - 99%)    MEDICATIONS  (STANDING):  acetaminophen   Tablet .. 975 milliGRAM(s) Oral every 6 hours  diphtheria/tetanus/pertussis (acellular) Vaccine (ADAcel) 0.5 milliLiter(s) IntraMuscular once  heparin  Injectable 5000 Unit(s) SubCutaneous every 12 hours  ibuprofen  Tablet. 600 milliGRAM(s) Oral every 6 hours    MEDICATIONS  (PRN):  diphenhydrAMINE 25 milliGRAM(s) Oral every 6 hours PRN Itching  docusate sodium 100 milliGRAM(s) Oral two times a day PRN Stool softening  glycerin Suppository - Adult 1 Suppository(s) Rectal at bedtime PRN Constipation  lanolin Ointment 1 Application(s) Topical every 6 hours PRN Sore Nipples  magnesium hydroxide Suspension 30 milliLiter(s) Oral two times a day PRN Constipation  oxyCODONE    IR 5 milliGRAM(s) Oral once PRN Moderate to Severe Pain (4-10)  oxyCODONE    IR 5 milliGRAM(s) Oral every 3 hours PRN Moderate to Severe Pain (4-10)  simethicone 80 milliGRAM(s) Chew every 4 hours PRN Gas      LABS:  Blood type: A Positive  Rubella IgG: Positive (10-08 @ 17:13)  RPR: Negative                          10.6<L>   9.81 >-----------< 119<L>    ( 10-09 @ 06:05 )             32.2<L>                        12.6   9.86 >-----------< 147<L>    ( 10-08 @ 17:01 )             40.7                  Physical exam:  Gen: NAD  Abdomen: Soft, tender to medium palpation, no distension , firm uterine fundus at umbilicus.  Incision: Clean, dry, and intact   Pelvic: Normal lochia noted  Ext: No calf tenderness        Louis Sullivan PGY1

## 2019-10-18 ENCOUNTER — LABORATORY RESULT (OUTPATIENT)
Age: 29
End: 2019-10-18

## 2019-10-18 ENCOUNTER — OUTPATIENT (OUTPATIENT)
Dept: OUTPATIENT SERVICES | Facility: HOSPITAL | Age: 29
LOS: 1 days | End: 2019-10-18

## 2019-10-18 ENCOUNTER — APPOINTMENT (OUTPATIENT)
Dept: OBGYN | Facility: HOSPITAL | Age: 29
End: 2019-10-18

## 2019-10-18 VITALS
WEIGHT: 117 LBS | DIASTOLIC BLOOD PRESSURE: 62 MMHG | BODY MASS INDEX: 20.73 KG/M2 | SYSTOLIC BLOOD PRESSURE: 101 MMHG | HEIGHT: 63 IN | HEART RATE: 77 BPM

## 2019-10-18 DIAGNOSIS — Z98.891 HISTORY OF UTERINE SCAR FROM PREVIOUS SURGERY: Chronic | ICD-10-CM

## 2019-10-18 DIAGNOSIS — Z82.49 FAMILY HISTORY OF ISCHEMIC HEART DISEASE AND OTHER DISEASES OF THE CIRCULATORY SYSTEM: ICD-10-CM

## 2019-10-18 DIAGNOSIS — Z83.3 FAMILY HISTORY OF DIABETES MELLITUS: ICD-10-CM

## 2019-10-18 RX ORDER — ACETAMINOPHEN 500 MG/1
TABLET ORAL
Refills: 0 | Status: ACTIVE | COMMUNITY

## 2019-10-18 RX ORDER — IBUPROFEN 800 MG/1
TABLET, FILM COATED ORAL
Refills: 0 | Status: ACTIVE | COMMUNITY

## 2019-10-18 NOTE — HISTORY OF PRESENT ILLNESS
[Last Pap Date: ___] : Last Pap Date: [unfilled] [Delivery Date: ___] : on [unfilled] [Repeat C/S] : delivered by  section (repeat) [Female] : Delivery History: baby girl [Wt. ___] : weighing [unfilled] [Breastfeeding] : currently nursing [Intended Contraception] : Intended Contraception: [Clean/Dry/Intact] : clean, dry and intact [Discharge HGB: ___] : hemoglobin level was [unfilled] [Discharge HCT: ___] : hematocrit level was [unfilled] [Complications:___] : no complications [Rhogam] : Rhogam was not administered [Resumed Menses] : has not resumed her menses [Rubella Vaccine] : Rubella vaccine was not administered [Erythema] : not erythematous [Resumed [place]] : has not resumed intercourse [Swelling] : not swollen [Dehiscence] : not dehisced [de-identified] : previous 2 c sections. pt to check with clinic when she had pap and if she had TDAP vaccine pt not sure if she had vaccine [FreeTextEntry9] : [facility] in [place] [FreeTextEntry8] : I am here to check the wound [de-identified] : s/p c section c/o dysuria  pt to check when last pap smear done not sure if she had TDAP s/p paraguard insertion postpartum [de-identified] : post delivery paraguard IUD [de-identified] : occ asional back pain c/o dysuria [de-identified] : urine culture continue pain meds as needed. pt to call to check about pap and TDAP check string next visit. no intercourse until post partum 6 wk exam RTC 1 month

## 2019-10-19 LAB — SPECIMEN SOURCE: SIGNIFICANT CHANGE UP

## 2019-10-21 DIAGNOSIS — Z83.3 FAMILY HISTORY OF DIABETES MELLITUS: ICD-10-CM

## 2019-10-21 DIAGNOSIS — Z98.891 HISTORY OF UTERINE SCAR FROM PREVIOUS SURGERY: ICD-10-CM

## 2019-10-21 DIAGNOSIS — Z87.440 PERSONAL HISTORY OF URINARY (TRACT) INFECTIONS: ICD-10-CM

## 2019-10-21 DIAGNOSIS — Z82.49 FAMILY HISTORY OF ISCHEMIC HEART DISEASE AND OTHER DISEASES OF THE CIRCULATORY SYSTEM: ICD-10-CM

## 2019-10-21 LAB
-  AMIKACIN: SIGNIFICANT CHANGE UP
-  AMPICILLIN/SULBACTAM: SIGNIFICANT CHANGE UP
-  AMPICILLIN: SIGNIFICANT CHANGE UP
-  AZTREONAM: SIGNIFICANT CHANGE UP
-  CEFAZOLIN: SIGNIFICANT CHANGE UP
-  CEFEPIME: SIGNIFICANT CHANGE UP
-  CEFOXITIN: SIGNIFICANT CHANGE UP
-  CEFTAZIDIME: SIGNIFICANT CHANGE UP
-  CEFTRIAXONE: SIGNIFICANT CHANGE UP
-  ERTAPENEM: SIGNIFICANT CHANGE UP
-  GENTAMICIN: SIGNIFICANT CHANGE UP
-  IMIPENEM: SIGNIFICANT CHANGE UP
-  LEVOFLOXACIN: SIGNIFICANT CHANGE UP
-  MEROPENEM: SIGNIFICANT CHANGE UP
-  NITROFURANTOIN: SIGNIFICANT CHANGE UP
-  PIPERACILLIN/TAZOBACTAM: SIGNIFICANT CHANGE UP
-  TOBRAMYCIN: SIGNIFICANT CHANGE UP
-  TRIMETHOPRIM/SULFAMETHOXAZOLE: SIGNIFICANT CHANGE UP
BACTERIA UR CULT: SIGNIFICANT CHANGE UP
METHOD TYPE: SIGNIFICANT CHANGE UP
ORGANISM # SPEC MICROSCOPIC CNT: SIGNIFICANT CHANGE UP
ORGANISM # SPEC MICROSCOPIC CNT: SIGNIFICANT CHANGE UP

## 2019-10-21 RX ORDER — CEPHALEXIN 500 MG/1
500 CAPSULE ORAL 4 TIMES DAILY
Qty: 28 | Refills: 0 | Status: ACTIVE | COMMUNITY
Start: 2019-10-21 | End: 1900-01-01

## 2019-11-15 ENCOUNTER — RESULT REVIEW (OUTPATIENT)
Age: 29
End: 2019-11-15

## 2019-11-15 ENCOUNTER — APPOINTMENT (OUTPATIENT)
Dept: OBGYN | Facility: HOSPITAL | Age: 29
End: 2019-11-15

## 2019-11-15 ENCOUNTER — OUTPATIENT (OUTPATIENT)
Dept: OUTPATIENT SERVICES | Facility: HOSPITAL | Age: 29
LOS: 1 days | End: 2019-11-15

## 2019-11-15 VITALS
SYSTOLIC BLOOD PRESSURE: 116 MMHG | DIASTOLIC BLOOD PRESSURE: 81 MMHG | BODY MASS INDEX: 19.88 KG/M2 | HEART RATE: 95 BPM | WEIGHT: 112.2 LBS

## 2019-11-15 DIAGNOSIS — Z98.891 HISTORY OF UTERINE SCAR FROM PREVIOUS SURGERY: ICD-10-CM

## 2019-11-15 DIAGNOSIS — Z98.891 HISTORY OF UTERINE SCAR FROM PREVIOUS SURGERY: Chronic | ICD-10-CM

## 2019-11-15 NOTE — HISTORY OF PRESENT ILLNESS
[Postpartum Follow Up] : postpartum follow up [Complications:___] : no complications [Last Pap Date: ___] : Last Pap Date: [unfilled] [Delivery Date: ___] : on [unfilled] [Repeat C/S] : delivered by  section (repeat) [Female] : Delivery History: baby girl [Wt. ___] : weighing [unfilled] [Rhogam] : Rhogam was not administered [Rubella Vaccine] : Rubella vaccine was not administered [Pertussis Vaccine] : Pertussis vaccine was not administered [BTL] : no tubal ligation [Breastfeeding] : currently nursing [Resumed Menses] : has not resumed her menses [Resumed [place]] : has not resumed intercourse [Intended Contraception] : Intended Contraception: [IUD] : intrauterine device [None] : No associated symptoms are reported [Healed] : healed [Examination Of The Breasts] : breasts are normal [Cervix Sample Taken] : cervical sample taken for a Pap smear [Doing Well] : is doing well [No [place]] : to avoid sexual intercourse [Limited ADLs] : to participate in activities of daily living with limitations [No Work] : not to work [Limited Housework] : to do housework with limitations [No Sports] : not to participate in sports [de-identified] : Paragard placed at time of c/s [de-identified] : Patient doing well, incision healed.  Pap sent.  no strings seen on speculum exam.  Bedside sono shows ParaGard in place [de-identified] : RTC for annual exam in Spring.  Call if bleeding does not resolve.  [de-identified] : x 3 more weeks

## 2019-11-18 DIAGNOSIS — Z98.891 HISTORY OF UTERINE SCAR FROM PREVIOUS SURGERY: ICD-10-CM

## 2019-11-20 LAB — CYTOLOGY SPEC DOC CYTO: SIGNIFICANT CHANGE UP

## 2019-12-15 ENCOUNTER — EMERGENCY (EMERGENCY)
Facility: HOSPITAL | Age: 29
LOS: 1 days | Discharge: HOME | End: 2019-12-15
Attending: EMERGENCY MEDICINE
Payer: MEDICAID

## 2019-12-15 VITALS
SYSTOLIC BLOOD PRESSURE: 110 MMHG | RESPIRATION RATE: 16 BRPM | HEART RATE: 123 BPM | DIASTOLIC BLOOD PRESSURE: 68 MMHG | TEMPERATURE: 101 F | OXYGEN SATURATION: 100 %

## 2019-12-15 DIAGNOSIS — Z98.891 HISTORY OF UTERINE SCAR FROM PREVIOUS SURGERY: Chronic | ICD-10-CM

## 2019-12-15 LAB
ALBUMIN SERPL ELPH-MCNC: 4.1 G/DL — SIGNIFICANT CHANGE UP (ref 3.3–5)
ALP SERPL-CCNC: 79 U/L — SIGNIFICANT CHANGE UP (ref 40–120)
ALT FLD-CCNC: 53 U/L — HIGH (ref 4–33)
ANION GAP SERPL CALC-SCNC: 13 MMO/L — SIGNIFICANT CHANGE UP (ref 7–14)
APPEARANCE UR: SIGNIFICANT CHANGE UP
APTT BLD: 35 SEC — SIGNIFICANT CHANGE UP (ref 27.5–36.3)
AST SERPL-CCNC: 37 U/L — HIGH (ref 4–32)
B PERT DNA SPEC QL NAA+PROBE: NOT DETECTED — SIGNIFICANT CHANGE UP
BACTERIA # UR AUTO: HIGH
BASE EXCESS BLDV CALC-SCNC: 4 MMOL/L — SIGNIFICANT CHANGE UP
BASOPHILS # BLD AUTO: 0.02 K/UL — SIGNIFICANT CHANGE UP (ref 0–0.2)
BASOPHILS NFR BLD AUTO: 0.2 % — SIGNIFICANT CHANGE UP (ref 0–2)
BILIRUB SERPL-MCNC: 0.3 MG/DL — SIGNIFICANT CHANGE UP (ref 0.2–1.2)
BILIRUB UR-MCNC: NEGATIVE — SIGNIFICANT CHANGE UP
BLOOD GAS VENOUS - CREATININE: SIGNIFICANT CHANGE UP MG/DL (ref 0.5–1.3)
BLOOD GAS VENOUS - FIO2: 21 — SIGNIFICANT CHANGE UP
BLOOD UR QL VISUAL: HIGH
BUN SERPL-MCNC: 8 MG/DL — SIGNIFICANT CHANGE UP (ref 7–23)
C PNEUM DNA SPEC QL NAA+PROBE: NOT DETECTED — SIGNIFICANT CHANGE UP
CALCIUM SERPL-MCNC: 8.6 MG/DL — SIGNIFICANT CHANGE UP (ref 8.4–10.5)
CHLORIDE BLDV-SCNC: 110 MMOL/L — HIGH (ref 96–108)
CHLORIDE SERPL-SCNC: 102 MMOL/L — SIGNIFICANT CHANGE UP (ref 98–107)
CO2 SERPL-SCNC: 21 MMOL/L — LOW (ref 22–31)
COLOR SPEC: SIGNIFICANT CHANGE UP
CREAT SERPL-MCNC: 0.71 MG/DL — SIGNIFICANT CHANGE UP (ref 0.5–1.3)
EOSINOPHIL # BLD AUTO: 0.01 K/UL — SIGNIFICANT CHANGE UP (ref 0–0.5)
EOSINOPHIL NFR BLD AUTO: 0.1 % — SIGNIFICANT CHANGE UP (ref 0–6)
FLUAV H1 2009 PAND RNA SPEC QL NAA+PROBE: NOT DETECTED — SIGNIFICANT CHANGE UP
FLUAV H1 RNA SPEC QL NAA+PROBE: NOT DETECTED — SIGNIFICANT CHANGE UP
FLUAV H3 RNA SPEC QL NAA+PROBE: NOT DETECTED — SIGNIFICANT CHANGE UP
FLUAV SUBTYP SPEC NAA+PROBE: NOT DETECTED — SIGNIFICANT CHANGE UP
FLUBV RNA SPEC QL NAA+PROBE: NOT DETECTED — SIGNIFICANT CHANGE UP
GAS PNL BLDV: 136 MMOL/L — SIGNIFICANT CHANGE UP (ref 136–146)
GLUCOSE BLDV-MCNC: 104 MG/DL — HIGH (ref 70–99)
GLUCOSE SERPL-MCNC: 98 MG/DL — SIGNIFICANT CHANGE UP (ref 70–99)
GLUCOSE UR-MCNC: NEGATIVE — SIGNIFICANT CHANGE UP
HADV DNA SPEC QL NAA+PROBE: NOT DETECTED — SIGNIFICANT CHANGE UP
HCO3 BLDV-SCNC: 26 MMOL/L — SIGNIFICANT CHANGE UP (ref 20–27)
HCOV PNL SPEC NAA+PROBE: SIGNIFICANT CHANGE UP
HCT VFR BLD CALC: 40.6 % — SIGNIFICANT CHANGE UP (ref 34.5–45)
HCT VFR BLDV CALC: 41.1 % — SIGNIFICANT CHANGE UP (ref 34.5–45)
HGB BLD-MCNC: 13.1 G/DL — SIGNIFICANT CHANGE UP (ref 11.5–15.5)
HGB BLDV-MCNC: 13.4 G/DL — SIGNIFICANT CHANGE UP (ref 11.5–15.5)
HMPV RNA SPEC QL NAA+PROBE: NOT DETECTED — SIGNIFICANT CHANGE UP
HPIV1 RNA SPEC QL NAA+PROBE: NOT DETECTED — SIGNIFICANT CHANGE UP
HPIV2 RNA SPEC QL NAA+PROBE: NOT DETECTED — SIGNIFICANT CHANGE UP
HPIV3 RNA SPEC QL NAA+PROBE: NOT DETECTED — SIGNIFICANT CHANGE UP
HPIV4 RNA SPEC QL NAA+PROBE: NOT DETECTED — SIGNIFICANT CHANGE UP
HYALINE CASTS # UR AUTO: NEGATIVE — SIGNIFICANT CHANGE UP
IMM GRANULOCYTES NFR BLD AUTO: 0.2 % — SIGNIFICANT CHANGE UP (ref 0–1.5)
INR BLD: 1.32 — HIGH (ref 0.88–1.17)
KETONES UR-MCNC: NEGATIVE — SIGNIFICANT CHANGE UP
LACTATE BLDV-MCNC: 1.3 MMOL/L — SIGNIFICANT CHANGE UP (ref 0.5–2)
LEUKOCYTE ESTERASE UR-ACNC: SIGNIFICANT CHANGE UP
LYMPHOCYTES # BLD AUTO: 1.46 K/UL — SIGNIFICANT CHANGE UP (ref 1–3.3)
LYMPHOCYTES # BLD AUTO: 15.1 % — SIGNIFICANT CHANGE UP (ref 13–44)
MCHC RBC-ENTMCNC: 29.7 PG — SIGNIFICANT CHANGE UP (ref 27–34)
MCHC RBC-ENTMCNC: 32.3 % — SIGNIFICANT CHANGE UP (ref 32–36)
MCV RBC AUTO: 92.1 FL — SIGNIFICANT CHANGE UP (ref 80–100)
MONOCYTES # BLD AUTO: 1.4 K/UL — HIGH (ref 0–0.9)
MONOCYTES NFR BLD AUTO: 14.5 % — HIGH (ref 2–14)
NEUTROPHILS # BLD AUTO: 6.73 K/UL — SIGNIFICANT CHANGE UP (ref 1.8–7.4)
NEUTROPHILS NFR BLD AUTO: 69.9 % — SIGNIFICANT CHANGE UP (ref 43–77)
NITRITE UR-MCNC: NEGATIVE — SIGNIFICANT CHANGE UP
NRBC # FLD: 0 K/UL — SIGNIFICANT CHANGE UP (ref 0–0)
PCO2 BLDV: 50 MMHG — SIGNIFICANT CHANGE UP (ref 41–51)
PH BLDV: 7.38 PH — SIGNIFICANT CHANGE UP (ref 7.32–7.43)
PH UR: 6.5 — SIGNIFICANT CHANGE UP (ref 5–8)
PLATELET # BLD AUTO: 254 K/UL — SIGNIFICANT CHANGE UP (ref 150–400)
PMV BLD: 11.4 FL — SIGNIFICANT CHANGE UP (ref 7–13)
PO2 BLDV: 24 MMHG — LOW (ref 35–40)
POTASSIUM BLDV-SCNC: 3.6 MMOL/L — SIGNIFICANT CHANGE UP (ref 3.4–4.5)
POTASSIUM SERPL-MCNC: 3.8 MMOL/L — SIGNIFICANT CHANGE UP (ref 3.5–5.3)
POTASSIUM SERPL-SCNC: 3.8 MMOL/L — SIGNIFICANT CHANGE UP (ref 3.5–5.3)
PROT SERPL-MCNC: 7.8 G/DL — SIGNIFICANT CHANGE UP (ref 6–8.3)
PROT UR-MCNC: 20 — SIGNIFICANT CHANGE UP
PROTHROM AB SERPL-ACNC: 14.8 SEC — HIGH (ref 9.8–13.1)
RBC # BLD: 4.41 M/UL — SIGNIFICANT CHANGE UP (ref 3.8–5.2)
RBC # FLD: 12.6 % — SIGNIFICANT CHANGE UP (ref 10.3–14.5)
RBC CASTS # UR COMP ASSIST: SIGNIFICANT CHANGE UP (ref 0–?)
RSV RNA SPEC QL NAA+PROBE: NOT DETECTED — SIGNIFICANT CHANGE UP
RV+EV RNA SPEC QL NAA+PROBE: NOT DETECTED — SIGNIFICANT CHANGE UP
SAO2 % BLDV: 35.7 % — LOW (ref 60–85)
SODIUM SERPL-SCNC: 136 MMOL/L — SIGNIFICANT CHANGE UP (ref 135–145)
SP GR SPEC: 1.01 — SIGNIFICANT CHANGE UP (ref 1–1.04)
SQUAMOUS # UR AUTO: SIGNIFICANT CHANGE UP
UROBILINOGEN FLD QL: NORMAL — SIGNIFICANT CHANGE UP
WBC # BLD: 9.64 K/UL — SIGNIFICANT CHANGE UP (ref 3.8–10.5)
WBC # FLD AUTO: 9.64 K/UL — SIGNIFICANT CHANGE UP (ref 3.8–10.5)
WBC UR QL: >50 — HIGH (ref 0–?)

## 2019-12-15 PROCEDURE — 99220: CPT

## 2019-12-15 PROCEDURE — 71046 X-RAY EXAM CHEST 2 VIEWS: CPT | Mod: 26

## 2019-12-15 RX ORDER — IBUPROFEN 200 MG
600 TABLET ORAL ONCE
Refills: 0 | Status: COMPLETED | OUTPATIENT
Start: 2019-12-15 | End: 2019-12-15

## 2019-12-15 RX ORDER — SODIUM CHLORIDE 9 MG/ML
2000 INJECTION, SOLUTION INTRAVENOUS ONCE
Refills: 0 | Status: COMPLETED | OUTPATIENT
Start: 2019-12-15 | End: 2019-12-15

## 2019-12-15 RX ORDER — ACETAMINOPHEN 500 MG
650 TABLET ORAL ONCE
Refills: 0 | Status: COMPLETED | OUTPATIENT
Start: 2019-12-15 | End: 2019-12-15

## 2019-12-15 RX ORDER — CEFTRIAXONE 500 MG/1
1000 INJECTION, POWDER, FOR SOLUTION INTRAMUSCULAR; INTRAVENOUS EVERY 24 HOURS
Refills: 0 | Status: DISCONTINUED | OUTPATIENT
Start: 2019-12-16 | End: 2020-01-05

## 2019-12-15 RX ORDER — CEFTRIAXONE 500 MG/1
1000 INJECTION, POWDER, FOR SOLUTION INTRAMUSCULAR; INTRAVENOUS ONCE
Refills: 0 | Status: COMPLETED | OUTPATIENT
Start: 2019-12-15 | End: 2019-12-15

## 2019-12-15 RX ORDER — SODIUM CHLORIDE 9 MG/ML
1000 INJECTION INTRAMUSCULAR; INTRAVENOUS; SUBCUTANEOUS ONCE
Refills: 0 | Status: DISCONTINUED | OUTPATIENT
Start: 2019-12-15 | End: 2019-12-15

## 2019-12-15 RX ORDER — SODIUM CHLORIDE 9 MG/ML
1000 INJECTION INTRAMUSCULAR; INTRAVENOUS; SUBCUTANEOUS
Refills: 0 | Status: DISCONTINUED | OUTPATIENT
Start: 2019-12-15 | End: 2020-01-05

## 2019-12-15 RX ORDER — SODIUM CHLORIDE 9 MG/ML
1000 INJECTION, SOLUTION INTRAVENOUS
Refills: 0 | Status: DISCONTINUED | OUTPATIENT
Start: 2019-12-15 | End: 2019-12-15

## 2019-12-15 RX ADMIN — SODIUM CHLORIDE 100 MILLILITER(S): 9 INJECTION, SOLUTION INTRAVENOUS at 18:35

## 2019-12-15 RX ADMIN — Medication 650 MILLIGRAM(S): at 23:00

## 2019-12-15 RX ADMIN — Medication 600 MILLIGRAM(S): at 23:00

## 2019-12-15 RX ADMIN — Medication 650 MILLIGRAM(S): at 14:19

## 2019-12-15 RX ADMIN — Medication 650 MILLIGRAM(S): at 13:49

## 2019-12-15 RX ADMIN — Medication 600 MILLIGRAM(S): at 13:49

## 2019-12-15 RX ADMIN — Medication 600 MILLIGRAM(S): at 22:26

## 2019-12-15 RX ADMIN — SODIUM CHLORIDE 2000 MILLILITER(S): 9 INJECTION, SOLUTION INTRAVENOUS at 13:49

## 2019-12-15 RX ADMIN — CEFTRIAXONE 100 MILLIGRAM(S): 500 INJECTION, POWDER, FOR SOLUTION INTRAMUSCULAR; INTRAVENOUS at 14:21

## 2019-12-15 RX ADMIN — Medication 650 MILLIGRAM(S): at 22:26

## 2019-12-15 RX ADMIN — Medication 600 MILLIGRAM(S): at 14:19

## 2019-12-15 NOTE — ED PROVIDER NOTE - ATTENDING CONTRIBUTION TO CARE
Dr. Red:  I have personally performed a face to face bedside history and physical examination of this patient. I have discussed the history, examination, review of systems, assessment and plan of management with the resident. I have reviewed the electronic medical record and amended it to reflect my history, review of systems, physical exam, assessment and plan.    29F denies pmh presents with 4 days of fever/chills, myalgias, and foul-smelling urine.  Also complains of some coughing.  Denies sick contacts.    Exam:  - nad  - tachy  - ctab  - abd soft, ntnd, +R CVAT    A/P  - sepsis, likely pyelo, eval other sources of infection  - sepsis panel, IVF, tylenol, reassess

## 2019-12-15 NOTE — ED PROVIDER NOTE - CLINICAL SUMMARY MEDICAL DECISION MAKING FREE TEXT BOX
Pt is a 28 y/o F w/ no PMHx who p/w diffuse myalgias and feeling feverish starting on thursday and she noticed that she had some pain in abdomin and coughing yesterday likely 2/2 influenza and with no signs of intraabdominal pathology. Right lower rib tenderness like costal in nature due to frequent coughing, Will get CXR, RSV/Influenza, UA, Urine Culture. Lower suspicion for pyelo given temporal history but if UA positive will consider further sepsis workup currently as patient likely with viral illness. Will likely discharge home.

## 2019-12-15 NOTE — ED CDU PROVIDER INITIAL DAY NOTE - OBJECTIVE STATEMENT
Pt is a 30 y/o F w/ no PMHx who p/w diffuse myalgias and feeling feverish starting on thursday and she noticed that she had some pain in abdomin and coughing yesterday, no dysuria, no blood in urine, +foul smelling urine, no vaginal discharge, +left ear with clear discharge, denies chest pain and headaches, no photophobia. Patient states she feels so weak it is hard to walk.    CDU LEXII Zurita: 29 y.o female no pmhx here with myalgias and feverish for the lat 3 days. Some dry cough. In the ED, chest xray negative, labs wnl, found to have a positive urine and started on ceftriaxone. Sent to the CDU for IV abx, symptomatic treatment.

## 2019-12-15 NOTE — ED PROVIDER NOTE - PROGRESS NOTE DETAILS
Patient with positive UA and will treat as sepsis due to pyelo give IVFs, Cultures, Abx, and will re-eval. Likely CDU candidate and will have them come evaluate.   -Ramo Shen PGY5 EMIM Pager#[phone]

## 2019-12-15 NOTE — ED ADULT TRIAGE NOTE - CHIEF COMPLAINT QUOTE
C/o fevers, body aches, abdominal pain, R flank pain, foul-smelling urine x3 days. Noticed fluid draining from L ear x1 week. Had C section in October.

## 2019-12-15 NOTE — ED PROVIDER NOTE - OBJECTIVE STATEMENT
Pt is a 30 y/o F w/ no PMHx who p/w diffuse myalgias and feeling feverish starting on thursday and she noticed that she had some pain in abdomin and coughing yesterday, no dysuria, no blood in urine, +foul smelling urine, no vaginal discharge, +left ear with clear discharge, denies chest pain and headaches, no photophobia. Patient states she feels so weak it is hard to walk.

## 2019-12-15 NOTE — ED CDU PROVIDER INITIAL DAY NOTE - PROGRESS NOTE DETAILS
cdu roz hendrix: pt examined at bedside, + c/o body aches, subjective fevers/chills, getting abx, fluids, afebrile currenly, will c/w fluids, abx, and reasses

## 2019-12-15 NOTE — ED CDU PROVIDER INITIAL DAY NOTE - NEURO NEGATIVE STATEMENT, MLM
Unknown if ever smoked
no loss of consciousness, no gait abnormality, no headache, no sensory deficits, and no weakness.

## 2019-12-15 NOTE — ED PROVIDER NOTE - RESPIRATORY NORMAL BREATH SOUNDS
RIGHT UPPER LOBE/LEFT UPPER LOBE/LEFT LOWER LOBE/RIGHT MIDDLE LOBE/RIGHT LOWER LOBE/no rales or rhonchi appreciated

## 2019-12-15 NOTE — ED CDU PROVIDER INITIAL DAY NOTE - ATTENDING CONTRIBUTION TO CARE
30 yo F with fever, myalgias, ua concerning for uti. will observe in cdu, iv abx, symptom relief, reassess

## 2019-12-16 VITALS
RESPIRATION RATE: 17 BRPM | TEMPERATURE: 100 F | SYSTOLIC BLOOD PRESSURE: 109 MMHG | OXYGEN SATURATION: 100 % | HEART RATE: 100 BPM | DIASTOLIC BLOOD PRESSURE: 70 MMHG

## 2019-12-16 LAB
ALBUMIN SERPL ELPH-MCNC: 3.4 G/DL — SIGNIFICANT CHANGE UP (ref 3.3–5)
ALP SERPL-CCNC: 74 U/L — SIGNIFICANT CHANGE UP (ref 40–120)
ALT FLD-CCNC: 48 U/L — HIGH (ref 4–33)
ANION GAP SERPL CALC-SCNC: 11 MMO/L — SIGNIFICANT CHANGE UP (ref 7–14)
AST SERPL-CCNC: 30 U/L — SIGNIFICANT CHANGE UP (ref 4–32)
BASE EXCESS BLDV CALC-SCNC: 0.5 MMOL/L — SIGNIFICANT CHANGE UP
BASOPHILS # BLD AUTO: 0.02 K/UL — SIGNIFICANT CHANGE UP (ref 0–0.2)
BASOPHILS NFR BLD AUTO: 0.2 % — SIGNIFICANT CHANGE UP (ref 0–2)
BILIRUB SERPL-MCNC: 0.3 MG/DL — SIGNIFICANT CHANGE UP (ref 0.2–1.2)
BLOOD GAS VENOUS - CREATININE: 0.56 MG/DL — SIGNIFICANT CHANGE UP (ref 0.5–1.3)
BLOOD GAS VENOUS - FIO2: 21 — SIGNIFICANT CHANGE UP
BUN SERPL-MCNC: 6 MG/DL — LOW (ref 7–23)
CALCIUM SERPL-MCNC: 8.3 MG/DL — LOW (ref 8.4–10.5)
CHLORIDE BLDV-SCNC: 109 MMOL/L — HIGH (ref 96–108)
CHLORIDE SERPL-SCNC: 106 MMOL/L — SIGNIFICANT CHANGE UP (ref 98–107)
CO2 SERPL-SCNC: 22 MMOL/L — SIGNIFICANT CHANGE UP (ref 22–31)
CREAT SERPL-MCNC: 0.54 MG/DL — SIGNIFICANT CHANGE UP (ref 0.5–1.3)
EOSINOPHIL # BLD AUTO: 0.1 K/UL — SIGNIFICANT CHANGE UP (ref 0–0.5)
EOSINOPHIL NFR BLD AUTO: 1.2 % — SIGNIFICANT CHANGE UP (ref 0–6)
GAS PNL BLDV: 136 MMOL/L — SIGNIFICANT CHANGE UP (ref 136–146)
GLUCOSE BLDV-MCNC: 88 MG/DL — SIGNIFICANT CHANGE UP (ref 70–99)
GLUCOSE SERPL-MCNC: 87 MG/DL — SIGNIFICANT CHANGE UP (ref 70–99)
HBA1C BLD-MCNC: 5.5 % — SIGNIFICANT CHANGE UP (ref 4–5.6)
HCO3 BLDV-SCNC: 24 MMOL/L — SIGNIFICANT CHANGE UP (ref 20–27)
HCT VFR BLD CALC: 35.1 % — SIGNIFICANT CHANGE UP (ref 34.5–45)
HCT VFR BLDV CALC: 36.3 % — SIGNIFICANT CHANGE UP (ref 34.5–45)
HGB BLD-MCNC: 11.3 G/DL — LOW (ref 11.5–15.5)
HGB BLDV-MCNC: 11.8 G/DL — SIGNIFICANT CHANGE UP (ref 11.5–15.5)
IMM GRANULOCYTES NFR BLD AUTO: 0.4 % — SIGNIFICANT CHANGE UP (ref 0–1.5)
LACTATE BLDV-MCNC: 1.3 MMOL/L — SIGNIFICANT CHANGE UP (ref 0.5–2)
LYMPHOCYTES # BLD AUTO: 2.07 K/UL — SIGNIFICANT CHANGE UP (ref 1–3.3)
LYMPHOCYTES # BLD AUTO: 24.3 % — SIGNIFICANT CHANGE UP (ref 13–44)
MCHC RBC-ENTMCNC: 30.1 PG — SIGNIFICANT CHANGE UP (ref 27–34)
MCHC RBC-ENTMCNC: 32.2 % — SIGNIFICANT CHANGE UP (ref 32–36)
MCV RBC AUTO: 93.4 FL — SIGNIFICANT CHANGE UP (ref 80–100)
MONOCYTES # BLD AUTO: 1.4 K/UL — HIGH (ref 0–0.9)
MONOCYTES NFR BLD AUTO: 16.4 % — HIGH (ref 2–14)
NEUTROPHILS # BLD AUTO: 4.91 K/UL — SIGNIFICANT CHANGE UP (ref 1.8–7.4)
NEUTROPHILS NFR BLD AUTO: 57.5 % — SIGNIFICANT CHANGE UP (ref 43–77)
NRBC # FLD: 0 K/UL — SIGNIFICANT CHANGE UP (ref 0–0)
PCO2 BLDV: 49 MMHG — SIGNIFICANT CHANGE UP (ref 41–51)
PH BLDV: 7.34 PH — SIGNIFICANT CHANGE UP (ref 7.32–7.43)
PLATELET # BLD AUTO: 198 K/UL — SIGNIFICANT CHANGE UP (ref 150–400)
PMV BLD: 11.6 FL — SIGNIFICANT CHANGE UP (ref 7–13)
PO2 BLDV: 86 MMHG — HIGH (ref 35–40)
POTASSIUM BLDV-SCNC: 3.7 MMOL/L — SIGNIFICANT CHANGE UP (ref 3.4–4.5)
POTASSIUM SERPL-MCNC: 4.1 MMOL/L — SIGNIFICANT CHANGE UP (ref 3.5–5.3)
POTASSIUM SERPL-SCNC: 4.1 MMOL/L — SIGNIFICANT CHANGE UP (ref 3.5–5.3)
PROT SERPL-MCNC: 6.4 G/DL — SIGNIFICANT CHANGE UP (ref 6–8.3)
RBC # BLD: 3.76 M/UL — LOW (ref 3.8–5.2)
RBC # FLD: 12.6 % — SIGNIFICANT CHANGE UP (ref 10.3–14.5)
SAO2 % BLDV: 96.3 % — HIGH (ref 60–85)
SODIUM SERPL-SCNC: 139 MMOL/L — SIGNIFICANT CHANGE UP (ref 135–145)
SPECIMEN SOURCE: SIGNIFICANT CHANGE UP
WBC # BLD: 8.53 K/UL — SIGNIFICANT CHANGE UP (ref 3.8–10.5)
WBC # FLD AUTO: 8.53 K/UL — SIGNIFICANT CHANGE UP (ref 3.8–10.5)

## 2019-12-16 PROCEDURE — 99217: CPT

## 2019-12-16 RX ORDER — ACETAMINOPHEN 500 MG
975 TABLET ORAL ONCE
Refills: 0 | Status: COMPLETED | OUTPATIENT
Start: 2019-12-16 | End: 2019-12-16

## 2019-12-16 RX ORDER — CEFDINIR 250 MG/5ML
1 POWDER, FOR SUSPENSION ORAL
Qty: 20 | Refills: 0
Start: 2019-12-16 | End: 2019-12-25

## 2019-12-16 RX ADMIN — CEFTRIAXONE 100 MILLIGRAM(S): 500 INJECTION, POWDER, FOR SOLUTION INTRAMUSCULAR; INTRAVENOUS at 13:45

## 2019-12-16 RX ADMIN — Medication 975 MILLIGRAM(S): at 09:32

## 2019-12-16 RX ADMIN — Medication 975 MILLIGRAM(S): at 10:02

## 2019-12-16 NOTE — ED CDU PROVIDER SUBSEQUENT DAY NOTE - PHYSICAL EXAMINATION
no rash no rash  A & O x 3, NAD, HEENT WNL and no facial asymmetry; lungs CTAB, heart with reg rhythm without murmur; abdomen soft NTND; extremities with no edema; neuro exam non focal with no motor or sensory deficits.

## 2019-12-16 NOTE — ED CDU PROVIDER SUBSEQUENT DAY NOTE - HISTORY
29 y.o female no pmhx here with myalgias and feverish for the lat 3 days. Some dry cough. In the ED, chest xray negative, labs wnl, found to have a positive urine and started on ceftriaxone. Sent to the CDU for IV abx, symptomatic treatment.

## 2019-12-16 NOTE — ED CDU PROVIDER SUBSEQUENT DAY NOTE - NS ED ATTENDING STATEMENT MOD
I have personally seen and examined this patient.  I have fully participated in the care of this patient. I have reviewed all pertinent clinical information, including history, physical exam, plan and the Medical Student and Resident’s note and agree except as noted. I have personally performed a face to face diagnostic evaluation on this patient. I have reviewed the ACP note and agree with the history, exam and plan of care, except as noted.

## 2019-12-16 NOTE — ED CDU PROVIDER SUBSEQUENT DAY NOTE - ATTENDING CONTRIBUTION TO CARE
29 y.o female no pmhx found to have Pyelonephritis- CDU IV abx, symptomatic treatment. Afebrile, patient otherwise feeling better after supportive management, to d/c home with PMD f/u and return precautions.     MADHAVI Berman: I have personally performed a face to face bedside history and physical examination of this patient. I have discussed the history, examination, review of systems, assessment and plan of management with the resident. I have reviewed the electronic medical record and amended it to reflect my history, review of systems, physical exam, assessment and plan.

## 2019-12-16 NOTE — ED CDU PROVIDER DISPOSITION NOTE - CLINICAL COURSE
Patient in no acute distress, intermittent fevers however defervesce with tylenol, otherwise patient stable, plan for d/c with f/u with PMD. Blood cultures reports are negative.

## 2019-12-16 NOTE — ED CDU PROVIDER DISPOSITION NOTE - PATIENT PORTAL LINK FT
You can access the FollowMyHealth Patient Portal offered by [facility] by registering at the following website: [email or web address]. By joining [facility]’s FollowMyHealth portal, you will also be able to view your health information using other applications (apps) compatible with our system.

## 2019-12-17 ENCOUNTER — INPATIENT (INPATIENT)
Facility: HOSPITAL | Age: 29
LOS: 0 days | Discharge: HOME | End: 2019-12-18
Attending: HOSPITALIST | Admitting: HOSPITALIST
Payer: MEDICAID

## 2019-12-17 VITALS
HEIGHT: 63 IN | HEART RATE: 80 BPM | RESPIRATION RATE: 16 BRPM | SYSTOLIC BLOOD PRESSURE: 114 MMHG | TEMPERATURE: 98 F | DIASTOLIC BLOOD PRESSURE: 69 MMHG | OXYGEN SATURATION: 100 %

## 2019-12-17 DIAGNOSIS — N12 TUBULO-INTERSTITIAL NEPHRITIS, NOT SPECIFIED AS ACUTE OR CHRONIC: ICD-10-CM

## 2019-12-17 DIAGNOSIS — Z02.9 ENCOUNTER FOR ADMINISTRATIVE EXAMINATIONS, UNSPECIFIED: ICD-10-CM

## 2019-12-17 DIAGNOSIS — D22.9 MELANOCYTIC NEVI, UNSPECIFIED: ICD-10-CM

## 2019-12-17 DIAGNOSIS — R05 COUGH: ICD-10-CM

## 2019-12-17 DIAGNOSIS — Z29.9 ENCOUNTER FOR PROPHYLACTIC MEASURES, UNSPECIFIED: ICD-10-CM

## 2019-12-17 DIAGNOSIS — Z98.891 HISTORY OF UTERINE SCAR FROM PREVIOUS SURGERY: Chronic | ICD-10-CM

## 2019-12-17 LAB
-  AMIKACIN: SIGNIFICANT CHANGE UP
-  AMPICILLIN/SULBACTAM: SIGNIFICANT CHANGE UP
-  AMPICILLIN: SIGNIFICANT CHANGE UP
-  AZTREONAM: SIGNIFICANT CHANGE UP
-  CEFAZOLIN: SIGNIFICANT CHANGE UP
-  CEFEPIME: SIGNIFICANT CHANGE UP
-  CEFOXITIN: SIGNIFICANT CHANGE UP
-  CEFTAZIDIME: SIGNIFICANT CHANGE UP
-  CEFTRIAXONE: SIGNIFICANT CHANGE UP
-  ERTAPENEM: SIGNIFICANT CHANGE UP
-  GENTAMICIN: SIGNIFICANT CHANGE UP
-  IMIPENEM: SIGNIFICANT CHANGE UP
-  LEVOFLOXACIN: SIGNIFICANT CHANGE UP
-  MEROPENEM: SIGNIFICANT CHANGE UP
-  NITROFURANTOIN: SIGNIFICANT CHANGE UP
-  PIPERACILLIN/TAZOBACTAM: SIGNIFICANT CHANGE UP
-  TIGECYCLINE: SIGNIFICANT CHANGE UP
-  TOBRAMYCIN: SIGNIFICANT CHANGE UP
-  TRIMETHOPRIM/SULFAMETHOXAZOLE: SIGNIFICANT CHANGE UP
ALBUMIN SERPL ELPH-MCNC: 4 G/DL — SIGNIFICANT CHANGE UP (ref 3.3–5)
ALP SERPL-CCNC: 86 U/L — SIGNIFICANT CHANGE UP (ref 40–120)
ALT FLD-CCNC: 53 U/L — HIGH (ref 4–33)
ANION GAP SERPL CALC-SCNC: 14 MMO/L — SIGNIFICANT CHANGE UP (ref 7–14)
APPEARANCE UR: SIGNIFICANT CHANGE UP
AST SERPL-CCNC: 30 U/L — SIGNIFICANT CHANGE UP (ref 4–32)
B PERT DNA SPEC QL NAA+PROBE: NOT DETECTED — SIGNIFICANT CHANGE UP
BACTERIA # UR AUTO: NEGATIVE — SIGNIFICANT CHANGE UP
BACTERIA UR CULT: SIGNIFICANT CHANGE UP
BASE EXCESS BLDV CALC-SCNC: 1.7 MMOL/L — SIGNIFICANT CHANGE UP
BASOPHILS # BLD AUTO: 0.02 K/UL — SIGNIFICANT CHANGE UP (ref 0–0.2)
BASOPHILS NFR BLD AUTO: 0.3 % — SIGNIFICANT CHANGE UP (ref 0–2)
BILIRUB SERPL-MCNC: < 0.2 MG/DL — LOW (ref 0.2–1.2)
BILIRUB UR-MCNC: NEGATIVE — SIGNIFICANT CHANGE UP
BLOOD GAS VENOUS - CREATININE: 0.5 MG/DL — SIGNIFICANT CHANGE UP (ref 0.5–1.3)
BLOOD UR QL VISUAL: HIGH
BUN SERPL-MCNC: 9 MG/DL — SIGNIFICANT CHANGE UP (ref 7–23)
C PNEUM DNA SPEC QL NAA+PROBE: NOT DETECTED — SIGNIFICANT CHANGE UP
CALCIUM SERPL-MCNC: 8.8 MG/DL — SIGNIFICANT CHANGE UP (ref 8.4–10.5)
CHLORIDE BLDV-SCNC: 102 MMOL/L — SIGNIFICANT CHANGE UP (ref 96–108)
CHLORIDE SERPL-SCNC: 102 MMOL/L — SIGNIFICANT CHANGE UP (ref 98–107)
CO2 SERPL-SCNC: 23 MMOL/L — SIGNIFICANT CHANGE UP (ref 22–31)
COLOR SPEC: YELLOW — SIGNIFICANT CHANGE UP
CREAT SERPL-MCNC: 0.47 MG/DL — LOW (ref 0.5–1.3)
EOSINOPHIL # BLD AUTO: 0.16 K/UL — SIGNIFICANT CHANGE UP (ref 0–0.5)
EOSINOPHIL NFR BLD AUTO: 2.7 % — SIGNIFICANT CHANGE UP (ref 0–6)
FLUAV H1 2009 PAND RNA SPEC QL NAA+PROBE: NOT DETECTED — SIGNIFICANT CHANGE UP
FLUAV H1 RNA SPEC QL NAA+PROBE: NOT DETECTED — SIGNIFICANT CHANGE UP
FLUAV H3 RNA SPEC QL NAA+PROBE: NOT DETECTED — SIGNIFICANT CHANGE UP
FLUAV SUBTYP SPEC NAA+PROBE: NOT DETECTED — SIGNIFICANT CHANGE UP
FLUBV RNA SPEC QL NAA+PROBE: NOT DETECTED — SIGNIFICANT CHANGE UP
GAS PNL BLDV: 141 MMOL/L — SIGNIFICANT CHANGE UP (ref 136–146)
GLUCOSE BLDV-MCNC: 93 MG/DL — SIGNIFICANT CHANGE UP (ref 70–99)
GLUCOSE SERPL-MCNC: 100 MG/DL — HIGH (ref 70–99)
GLUCOSE UR-MCNC: NEGATIVE — SIGNIFICANT CHANGE UP
HADV DNA SPEC QL NAA+PROBE: NOT DETECTED — SIGNIFICANT CHANGE UP
HCG SERPL-ACNC: < 5 MIU/ML — SIGNIFICANT CHANGE UP
HCO3 BLDV-SCNC: 25 MMOL/L — SIGNIFICANT CHANGE UP (ref 20–27)
HCOV PNL SPEC NAA+PROBE: SIGNIFICANT CHANGE UP
HCT VFR BLD CALC: 36 % — SIGNIFICANT CHANGE UP (ref 34.5–45)
HCT VFR BLDV CALC: 33.4 % — LOW (ref 34.5–45)
HGB BLD-MCNC: 11.6 G/DL — SIGNIFICANT CHANGE UP (ref 11.5–15.5)
HGB BLDV-MCNC: 10.8 G/DL — LOW (ref 11.5–15.5)
HMPV RNA SPEC QL NAA+PROBE: NOT DETECTED — SIGNIFICANT CHANGE UP
HPIV1 RNA SPEC QL NAA+PROBE: NOT DETECTED — SIGNIFICANT CHANGE UP
HPIV2 RNA SPEC QL NAA+PROBE: NOT DETECTED — SIGNIFICANT CHANGE UP
HPIV3 RNA SPEC QL NAA+PROBE: NOT DETECTED — SIGNIFICANT CHANGE UP
HPIV4 RNA SPEC QL NAA+PROBE: NOT DETECTED — SIGNIFICANT CHANGE UP
HYALINE CASTS # UR AUTO: HIGH
IMM GRANULOCYTES NFR BLD AUTO: 0.2 % — SIGNIFICANT CHANGE UP (ref 0–1.5)
KETONES UR-MCNC: NEGATIVE — SIGNIFICANT CHANGE UP
LACTATE BLDV-MCNC: 1.3 MMOL/L — SIGNIFICANT CHANGE UP (ref 0.5–2)
LEUKOCYTE ESTERASE UR-ACNC: SIGNIFICANT CHANGE UP
LYMPHOCYTES # BLD AUTO: 2.23 K/UL — SIGNIFICANT CHANGE UP (ref 1–3.3)
LYMPHOCYTES # BLD AUTO: 38.1 % — SIGNIFICANT CHANGE UP (ref 13–44)
MCHC RBC-ENTMCNC: 29.6 PG — SIGNIFICANT CHANGE UP (ref 27–34)
MCHC RBC-ENTMCNC: 32.2 % — SIGNIFICANT CHANGE UP (ref 32–36)
MCV RBC AUTO: 91.8 FL — SIGNIFICANT CHANGE UP (ref 80–100)
METHOD TYPE: SIGNIFICANT CHANGE UP
MONOCYTES # BLD AUTO: 0.94 K/UL — HIGH (ref 0–0.9)
MONOCYTES NFR BLD AUTO: 16.1 % — HIGH (ref 2–14)
NEUTROPHILS # BLD AUTO: 2.49 K/UL — SIGNIFICANT CHANGE UP (ref 1.8–7.4)
NEUTROPHILS NFR BLD AUTO: 42.6 % — LOW (ref 43–77)
NITRITE UR-MCNC: NEGATIVE — SIGNIFICANT CHANGE UP
NRBC # FLD: 0 K/UL — SIGNIFICANT CHANGE UP (ref 0–0)
ORGANISM # SPEC MICROSCOPIC CNT: SIGNIFICANT CHANGE UP
ORGANISM # SPEC MICROSCOPIC CNT: SIGNIFICANT CHANGE UP
PCO2 BLDV: 48 MMHG — SIGNIFICANT CHANGE UP (ref 41–51)
PH BLDV: 7.36 PH — SIGNIFICANT CHANGE UP (ref 7.32–7.43)
PH UR: 6.5 — SIGNIFICANT CHANGE UP (ref 5–8)
PLATELET # BLD AUTO: 276 K/UL — SIGNIFICANT CHANGE UP (ref 150–400)
PMV BLD: 11 FL — SIGNIFICANT CHANGE UP (ref 7–13)
PO2 BLDV: 44 MMHG — HIGH (ref 35–40)
POTASSIUM BLDV-SCNC: 3.4 MMOL/L — SIGNIFICANT CHANGE UP (ref 3.4–4.5)
POTASSIUM SERPL-MCNC: 3.5 MMOL/L — SIGNIFICANT CHANGE UP (ref 3.5–5.3)
POTASSIUM SERPL-SCNC: 3.5 MMOL/L — SIGNIFICANT CHANGE UP (ref 3.5–5.3)
PROT SERPL-MCNC: 7.6 G/DL — SIGNIFICANT CHANGE UP (ref 6–8.3)
PROT UR-MCNC: 20 — SIGNIFICANT CHANGE UP
RBC # BLD: 3.92 M/UL — SIGNIFICANT CHANGE UP (ref 3.8–5.2)
RBC # FLD: 12.4 % — SIGNIFICANT CHANGE UP (ref 10.3–14.5)
RBC CASTS # UR COMP ASSIST: HIGH (ref 0–?)
RSV RNA SPEC QL NAA+PROBE: NOT DETECTED — SIGNIFICANT CHANGE UP
RV+EV RNA SPEC QL NAA+PROBE: NOT DETECTED — SIGNIFICANT CHANGE UP
SAO2 % BLDV: 75.7 % — SIGNIFICANT CHANGE UP (ref 60–85)
SODIUM SERPL-SCNC: 139 MMOL/L — SIGNIFICANT CHANGE UP (ref 135–145)
SP GR SPEC: 1.02 — SIGNIFICANT CHANGE UP (ref 1–1.04)
SQUAMOUS # UR AUTO: SIGNIFICANT CHANGE UP
UROBILINOGEN FLD QL: NORMAL — SIGNIFICANT CHANGE UP
WBC # BLD: 5.85 K/UL — SIGNIFICANT CHANGE UP (ref 3.8–10.5)
WBC # FLD AUTO: 5.85 K/UL — SIGNIFICANT CHANGE UP (ref 3.8–10.5)
WBC UR QL: HIGH (ref 0–?)

## 2019-12-17 PROCEDURE — 71046 X-RAY EXAM CHEST 2 VIEWS: CPT | Mod: 26

## 2019-12-17 PROCEDURE — 99223 1ST HOSP IP/OBS HIGH 75: CPT

## 2019-12-17 RX ORDER — ACETAMINOPHEN 500 MG
650 TABLET ORAL EVERY 6 HOURS
Refills: 0 | Status: DISCONTINUED | OUTPATIENT
Start: 2019-12-17 | End: 2019-12-18

## 2019-12-17 RX ORDER — SODIUM CHLORIDE 9 MG/ML
1000 INJECTION INTRAMUSCULAR; INTRAVENOUS; SUBCUTANEOUS ONCE
Refills: 0 | Status: COMPLETED | OUTPATIENT
Start: 2019-12-17 | End: 2019-12-17

## 2019-12-17 RX ORDER — MEROPENEM 1 G/30ML
1000 INJECTION INTRAVENOUS ONCE
Refills: 0 | Status: COMPLETED | OUTPATIENT
Start: 2019-12-17 | End: 2019-12-17

## 2019-12-17 RX ORDER — SODIUM CHLORIDE 9 MG/ML
1000 INJECTION, SOLUTION INTRAVENOUS
Refills: 0 | Status: DISCONTINUED | OUTPATIENT
Start: 2019-12-17 | End: 2019-12-18

## 2019-12-17 RX ORDER — POTASSIUM CHLORIDE 20 MEQ
40 PACKET (EA) ORAL ONCE
Refills: 0 | Status: COMPLETED | OUTPATIENT
Start: 2019-12-17 | End: 2019-12-17

## 2019-12-17 RX ORDER — SIMETHICONE 80 MG/1
80 TABLET, CHEWABLE ORAL
Refills: 0 | Status: DISCONTINUED | OUTPATIENT
Start: 2019-12-17 | End: 2019-12-18

## 2019-12-17 RX ORDER — ERTAPENEM SODIUM 1 G/1
1000 INJECTION, POWDER, LYOPHILIZED, FOR SOLUTION INTRAMUSCULAR; INTRAVENOUS EVERY 24 HOURS
Refills: 0 | Status: DISCONTINUED | OUTPATIENT
Start: 2019-12-18 | End: 2019-12-18

## 2019-12-17 RX ADMIN — Medication 40 MILLIEQUIVALENT(S): at 22:30

## 2019-12-17 RX ADMIN — SODIUM CHLORIDE 1000 MILLILITER(S): 9 INJECTION INTRAMUSCULAR; INTRAVENOUS; SUBCUTANEOUS at 17:45

## 2019-12-17 RX ADMIN — SODIUM CHLORIDE 75 MILLILITER(S): 9 INJECTION, SOLUTION INTRAVENOUS at 22:30

## 2019-12-17 RX ADMIN — MEROPENEM 100 MILLIGRAM(S): 1 INJECTION INTRAVENOUS at 17:57

## 2019-12-17 RX ADMIN — MEROPENEM 1000 MILLIGRAM(S): 1 INJECTION INTRAVENOUS at 18:35

## 2019-12-17 NOTE — ED PROVIDER NOTE - PROGRESS NOTE DETAILS
DW Dr Watts, [phone], spoke with him. Aware pt in ED. He does not admit here, recommend admit to hospitalist. Spoke with Dr Grant, accepted pt. pt aware of results and plan. Feeling well, no complaints.

## 2019-12-17 NOTE — ED PROVIDER NOTE - NS_ ATTENDINGSCRIBEDETAILS _ED_A_ED_FT
The scribe's documentation has been prepared under my direction and personally reviewed by me in it's entirely.  I confirm that the note above accurately reflects all work, treatment, procedures, and medical decision making performed by me.  (Dr. Irwin)

## 2019-12-17 NOTE — H&P ADULT - HISTORY OF PRESENT ILLNESS
28 y/o female presents to ED for call back from ED for positive ESBL E Coli  in urine Culture from  Dec. 15, 2019. Pt resents w/ nonproductive cough for the past 2 days and fever since last night. No vomiting, diarrhea, or dysuria. PT was discharged a day ago  with  po Antibiotic Cefdinir from [facility] CDU.  She  states the she has had fever, chills, myalgia, back pain right greater than left,  +nonproductive cough x 2 weeks,  no recent  travel. 30 y/o female POST Partum x 2 months,  with HX of UTI x 1 in the past, + Numerous Skin Moles,  presents to ED for call back from ED for positive ESBL E Coli  in urine Culture from  Dec. 15, 2019. Pt resents w/ nonproductive cough for the past 2 days and fever since last night. No vomiting, NO  diarrhea, No  dysuria. PT was discharged a day ago from [facility] CDU with  po Antibiotic Cefdinir from .  She  states the she has had fever, chills, myalgia, back pain right greater than left,  +nonproductive cough x 2 weeks,  no recent  travel, no no sick contact, + HA, NO CP, NO SOB, cough is Dry, No dysuria, no Abdominal pain, No Rash, + RT CVAT , LMP Dec. 6, 2019, Pt is NOT Breast feeding, POST Partum x 2 months, Pt awake, A+O x 4, RVP Negative  from 2 days ago, Chest X ray Clear (prelim), No N/V, S/P IV Meropenem 1 gm x 1 and IVF NS x One Lit given by ER tonight, Repeat  RVP pending, No sore throat, Pt c/o Foul Smell urine,     Labs: Lactate  1.3, Na 139, K+ 3.5 was Replaced, BUN 9, Creatinine 0.47, Glucose 100, AST 53, HCG < 5, WBC 5.85, Hgb 11.6, platelet 276, HgbA1c 5.5%,   UA: Turbid, Mod. Blood, Protein 20, Small Leuk. Est.,   Urine Culture from Dec. 15. 2019: E. Coli ESBL, 28 y/o female POST Partum x 2 months,  with HX of UTI x 1 in the past, + Numerous Skin Moles,  presents to ED for call back from ED for positive ESBL E Coli  in urine Culture from  Dec. 15, 2019. Pt resents w/ nonproductive cough for the past 2 days and fever since last night. No vomiting, NO  diarrhea, No  dysuria. PT was discharged a day ago from [facility] CDU with  po Antibiotic Cefdinir from .  She  states the she has had fever, chills, myalgia, back pain right greater than left,  +nonproductive cough x 2 weeks,  no recent  travel, no no sick contact, + HA, NO CP, NO SOB, cough is Dry, No dysuria, no Abdominal pain, No Rash, + RT CVAT , LMP Dec. 6, 2019, Pt is NOT Breast feeding, POST Partum x 2 months, Pt awake, A+O x 4, RVP Negative  from 2 days ago, Chest X ray Clear (prelim), No N/V, S/P IV Meropenem 1 gm x 1 and IVF NS x One Lit given by ER tonight, Repeat  RVP Negative , No sore throat, Pt c/o Foul Smell urine, Note: Pt has IUD placed in Oct. 2019.     Labs: Lactate  1.3, Na 139, K+ 3.5 was Replaced, BUN 9, Creatinine 0.47, Glucose 100, AST 53, HCG < 5, WBC 5.85, Hgb 11.6, platelet 276, HgbA1c 5.5%,   UA: Turbid, Mod. Blood, Protein 20, Small Leuk. Est., RVP repeat Negative,   Urine Culture from Dec. 15. 2019: E. Coli ESBL,

## 2019-12-17 NOTE — ED ADULT TRIAGE NOTE - CHIEF COMPLAINT QUOTE
Pt presents as a call back for UCx +for ESBL, patient previously in ED for fevers, chills, abdominal pain was dc'ed on cefidnir.

## 2019-12-17 NOTE — H&P ADULT - RS GEN PE MLT RESP DETAILS PC
clear to auscultation bilaterally/no wheezes/respirations non-labored/no rales/no chest wall tenderness/no rhonchi

## 2019-12-17 NOTE — ED ADULT NURSE NOTE - NS ED NURSE RECORD ANOTHER VITAL SIGN
Pediatric Occupational Therapy Progress Note      Name: Monica Sellers  Date of Note: 02/15/2018   Clinic #: [id number]  : 2015     Start Time: 2:30  Stop Time: 3:10  Total Time: 40 min     Visit #3 of 12, referral expiring 2018     Subjective:      Monica was brought to therapy by her mother and grandmother who were present in treatment room during session. Mom states she sometimes gags with certain smells.  Mom with no new report this date.       Pain: Pt unable to rate pain due to age and understanding. No pain behaviors noted throughout session.     Objective:      Pt received skilled instruction upon and participated in the following activities to facilitate age-appropriate fine motor skills, visual motor coordination, visual perceptual skills, bilateral coordination, BUE tone, strength and ROM:   Fine Motor/Visual Motor: gross palmar grasp on toys maintained for up to 15 seconds when placed in hand, required [place] A to move to and release at visual target once placed; reaching out for colored soft rings on table, bringing to chest or mouth with no purposeful or functional movement while holding object  Bilateral Coordination: increased spontaneous reaching for objects this date when placed in visual field and on table but not consistent, most attentive to mother's phone screen; brought hands to midline on ring but preferred to hold with one hand, R more than L; will grasp in hand if toy is placed or is touching hand; facilitating bilateral reaching for larger toy [place], unable independently; pulling apart velco items with [place]   Tone/Postural Control: unable to sit for longer than 30 seconds always needing to move; berna and john sitting on platform swing with continuous movement forward, backward, and side to side for trunk control and sensory input, continuous movement throughout; deep pressure, john sitting to look at mothers phone with support   Strength/ROM: WBing in quadruped over leg and with  support on platform swing to promote trunk control; independent to position wrists appropriately for weightbearing in quadruped; weight shifting from one UE to other to reach for toy in frontal plane, very limited by decreased visual attention and pushing out of positioning  Sensory/Oral Motor:  DPP with continuous movement throughout brushing, joint compressions and deep pressure massage tolerated well; movement and heavywork positions over peanut ball with continuous movement, when stopped deep pressure would continue to move; continuous movement on swing in various positions; touching various textures including dry rice, wet paint and soapy bubbles on tray with no resistance to [place] and no gagging this date        Assessment:      Monica was seen for a follow-up visit today. Monica with fair tolerance of session needing constant movement through bouncing of ball, swing,  DPP, and deep pressure. Monica demonstrating very limited visual attention to objects presented due to mom using her phone today, will only maintain visual attention to mother's phone.  Monica demonstrated increased spontaneous reaching for toys this date following sensory input and DPP however is unable to control movement to complete a functional task. Monica only able to functionally release if holding wrist over bucket.  Monica responds well with deep pressure and sensory input from movement on ball or swing, however when stopped, Monica reverts back to continuous non purposefull movement. Monica continues to demonstrate decreased truncal tone with increased extremity tone which are exacerbated by increased accessory movements which limit functional mobility and engagement. Monica demonstrated no adverse reactions when touching various textures this date with dry material, soapy texture, and finger paint.  Poor engagement with toys and manipulative as well as poor visual attention continues to affect Monica's progress in therapy. Monica's participation is limited by her  visual attention to a task, only being able to visually attend to mother's phone when playing preferred video.  Monica continues to present with deficits in fine motor skills, visual motor coordination, visual perceptual skills and bilateral coordination. Pt continues to benefit from skilled occupational therapy to progress toward the following goals.      Education:       Discussed continuing current plan of care. Discussed touching and desensitizing to various textures including the ones she is gagging on. Discussed mom bringing in soap that she gagged on next session. Family verbalized understanding.      Goals:      Previous Goals:     1. Demonstrate increased manual dexterity as displayed by ability to bring hands to midline to hold bottle with [place] A as needed 50% of trials. (MET, will hold at midline but will not bring to mouth)  2. Demonstrate increased BUE strength and functional use as displayed by ability to tolerate WBing with appropriate wrist position x 15 seconds 3/5 trials. (MET)  3. Demonstrate increased visual motor coordination as displayed by ability to reach for colored object within 10 seconds of presentation 50% of trials. (MET, mostly with musical toys and light up toys)  4. Demonstrate increased fine motor coordination as displayed by ability to obtain and maintain grasp on 1 cube >10 seconds 50% of trials. (MET on cubes and shapes for no more than 10 seconds)  5. Demonstrate increased sensory integration as displayed by ability to attend to tabletop activity for 1 minute following appropriate proprioceptive input 3/5 sessions. (NOT MET, d/c goal due to no change in movements with weighted materials at this time)  6. Demonstrate increased BUE strength and functional use as displayed by ability to tolerate WBing with appropriate wrist position x 30 seconds 3/5 trials. (MET multiple trials)  7. Demonstrate increased fine motor coordination as displayed by ability to obtain and maintain grasp on  1 cube >15 seconds 75% of trials. (MET, up to 15 seconds)     Short term goals: (5/30/18)     1. Demonstrate increased age-appropriate feeding skills as displayed by ability to obtain finger-food sized objects from tabletop using raking grasp 50% of trials. (NOT MET, no grasp or manipulation of small items)     2. Demonstrate increased visual motor coordination as displayed by ability to reach for colored object within 10 seconds of presentation 90% of trials. (NOT MET, about 50%)     3. Demonstrate increased fine motor coordination as displayed by ability to obtain and maintain grasp on 1 cube >25 seconds 75% of trials.      4. Demonstrate increased manual dexterity as displayed by ability to pull apart velcro items with [place] A 75%. (progressing)     5. Demonstrate increased visual motor skills as displayed by ability to complete 3-piece basic shape puzzle with verbal cues 50%. (no attention to task)     6. Demonstrate increased sensory tolerances as displayed by touching wet and sticky textures with no signs of distress (gagging, coughing)  75%. (NEW GOAL)     Will reassess goals and update plan of care as needed.     Plan:  Occupational therapy services will be provided 1x/week from 11/16/2017 to 5/30/2017 through direct intervention, parent education and home programming.     SHABBIR Mo  02/15/2018      Yes

## 2019-12-17 NOTE — H&P ADULT - ASSESSMENT
30 y/o female POST Partum x 2 months,  with HX of UTI x 1 in the past, + Numerous Skin Moles,  presents to ED for call back from ED for positive ESBL E Coli  in urine Culture from  Dec. 15, 2019. Pt resents w/ nonproductive cough for the past 2 days and fever since last night. No vomiting, NO  diarrhea, No  dysuria. PT was discharged a day ago from [facility] CDU with  po Antibiotic Cefdinir from .  She  states the she has had fever, chills, myalgia, back pain right greater than left,  +nonproductive cough x 2 weeks,  no recent  travel, no no sick contact, + HA, NO CP, NO SOB, cough is Dry, No dysuria, no Abdominal pain, No Rash, + RT CVAT , LMP Dec. 6, 2019, Pt is NOT Breast feeding, POST Partum x 2 months, Pt awake, A+O x 4, RVP Negative  from 2 days ago, Chest X ray Clear (prelim), No N/V, S/P IV Meropenem 1 gm x 1 and IVF NS x One Lit given by ER tonight, Repeat  RVP pending, No sore throat, Pt c/o Foul Smell urine, 30 y/o female POST Partum x 2 months,  with HX of UTI x 1 in the past, + Numerous Skin Moles,  presents to ED for call back from ED for positive ESBL E Coli  in urine Culture from  Dec. 15, 2019. Pt resents w/ nonproductive cough for the past 2 days and fever since last night. No vomiting, NO  diarrhea, No  dysuria. PT was discharged a day ago from [facility] CDU with  po Antibiotic Cefdinir from .  She  states the she has had fever, chills, myalgia, back pain right greater than left,  +nonproductive cough x 2 weeks,  no recent  travel, no no sick contact, + HA, NO CP, NO SOB, cough is Dry, No dysuria, no Abdominal pain, No Rash, + RT CVAT , LMP Dec. 6, 2019, Pt is NOT Breast feeding, POST Partum x 2 months, Pt awake, A+O x 4, RVP Negative  from 2 days ago, Chest X ray Clear (prelim), No N/V, S/P IV Meropenem 1 gm x 1 and IVF NS x One Lit given by ER tonight, Repeat  RVP Negative , No sore throat, Pt c/o Foul Smell urine, Note: Pt has IUD placed in Oct. 2019.

## 2019-12-17 NOTE — H&P ADULT - PROBLEM SELECTOR PLAN 4
DVT Prophylaxis: Venodyne, PT, PTT, INR,    TSH, Free T4, Iron Studies, Ferritin, Vit B12, Folate, Fasting Lipid, Hep A,B,C  profile,

## 2019-12-17 NOTE — ED ADULT NURSE NOTE - OBJECTIVE STATEMENT
Received pt in intake 5, ambulatory, pt A&Ox4, respirations even and unlabored b/l. Pt arrives to ED, called back for +urine culture for ESBL. Pt reports discharged yesterday for fever, chills. Reports feels fine at this time. Abdomen soft, nondistended, nontender. IVL 20g Angiocath placed on right AC. Labs sent. Urine sent. Call bell within reach. Will continue to monitor.

## 2019-12-17 NOTE — ED PROVIDER NOTE - OBJECTIVE STATEMENT
30 y/o female presents to ED for call back from ED for positive ESBL in urine 3 days ago. Presents w/ nonproductive cough for 2 days and fevers since last night. No vomiting, diarrhea, or dysuria. 30 y/o female presents to ED for call back from ED for positive ESBL in urine 3 days ago. Presents w/ nonproductive cough for 2 days and fevers since last night. No vomiting, diarrhea, or dysuria. PT was discharged a day ago  w po abx, pt cant recall name. States she has had temp, subjective fever, chills, myalgia, back pain right greater than left. +nonproductive cough x 2 weeks. no travel. no abdominal pain. no dysuria

## 2019-12-17 NOTE — H&P ADULT - NSICDXPASTMEDICALHX_GEN_ALL_CORE_FT
PAST MEDICAL HISTORY:  No pertinent past medical history PAST MEDICAL HISTORY:  Numerous skin moles     UTI (urinary tract infection)

## 2019-12-17 NOTE — ED POST DISCHARGE NOTE - RESULT SUMMARY
Patient UCx +for ESBL, patient previously in ED for fevers, chills, abdominal pain was dc yesterday on cefidnir. Pt called, aware of results and need to return to ED for IV abx/admission. Pt aware, states she will return to ED.

## 2019-12-17 NOTE — H&P ADULT - PROBLEM SELECTOR PLAN 5
Transitions of Care Status:  1.  Name of PCP: NO PCP,   2.  PCP Contacted on Admission: [ ] Y    [ ] N    3.  PCP contacted at Discharge: [ ] Y    [ ] N    [ ] N/A  4.  Post-Discharge Appointment Date and Location:  5.  Summary of Handoff given to PCP:

## 2019-12-17 NOTE — H&P ADULT - NSICDXPASTSURGICALHX_GEN_ALL_CORE_FT
PAST SURGICAL HISTORY:  S/P  19 girl 5-0 CPD  2015 9/16/15 girl 5-3 rpt PAST SURGICAL HISTORY:  S/P  19 girl 5-0 CPD  2015 9/16/15 girl 5-3 rpt  Oct. 2019

## 2019-12-17 NOTE — H&P ADULT - NSICDXFAMILYHX_GEN_ALL_CORE_FT
FAMILY HISTORY:  No pertinent family history in first degree relatives FAMILY HISTORY:  Family history of diabetes mellitus (DM)  FH: HTN (hypertension)

## 2019-12-17 NOTE — H&P ADULT - PROBLEM SELECTOR PLAN 2
Dry  Cough, Repeat RVP pending, Chest X ray prelim clear, no SOB, NO CP, no recent travel, no sick contact, no Wheezing, R/O Viral syndrome, No Leukocytosis, Dry  Cough, Repeat RVP Negative , Chest X ray prelim clear, no SOB, NO CP, no recent travel, no sick contact, no Wheezing, R/O Viral syndrome, No Leukocytosis,

## 2019-12-18 ENCOUNTER — TRANSCRIPTION ENCOUNTER (OUTPATIENT)
Age: 29
End: 2019-12-18

## 2019-12-18 VITALS — SYSTOLIC BLOOD PRESSURE: 110 MMHG | HEART RATE: 84 BPM | DIASTOLIC BLOOD PRESSURE: 77 MMHG

## 2019-12-18 LAB
ALBUMIN SERPL ELPH-MCNC: 3.3 G/DL — SIGNIFICANT CHANGE UP (ref 3.3–5)
ALP SERPL-CCNC: 71 U/L — SIGNIFICANT CHANGE UP (ref 40–120)
ALT FLD-CCNC: 43 U/L — HIGH (ref 4–33)
ANION GAP SERPL CALC-SCNC: 13 MMO/L — SIGNIFICANT CHANGE UP (ref 7–14)
APTT BLD: 30.8 SEC — SIGNIFICANT CHANGE UP (ref 27.5–36.3)
AST SERPL-CCNC: 25 U/L — SIGNIFICANT CHANGE UP (ref 4–32)
BASOPHILS # BLD AUTO: 0.03 K/UL — SIGNIFICANT CHANGE UP (ref 0–0.2)
BASOPHILS NFR BLD AUTO: 0.7 % — SIGNIFICANT CHANGE UP (ref 0–2)
BILIRUB SERPL-MCNC: < 0.2 MG/DL — LOW (ref 0.2–1.2)
BUN SERPL-MCNC: 8 MG/DL — SIGNIFICANT CHANGE UP (ref 7–23)
CALCIUM SERPL-MCNC: 8.2 MG/DL — LOW (ref 8.4–10.5)
CHLORIDE SERPL-SCNC: 107 MMOL/L — SIGNIFICANT CHANGE UP (ref 98–107)
CHOLEST SERPL-MCNC: 146 MG/DL — SIGNIFICANT CHANGE UP (ref 120–199)
CO2 SERPL-SCNC: 21 MMOL/L — LOW (ref 22–31)
CREAT SERPL-MCNC: 0.49 MG/DL — LOW (ref 0.5–1.3)
EOSINOPHIL # BLD AUTO: 0.19 K/UL — SIGNIFICANT CHANGE UP (ref 0–0.5)
EOSINOPHIL NFR BLD AUTO: 4.1 % — SIGNIFICANT CHANGE UP (ref 0–6)
FERRITIN SERPL-MCNC: 86.64 NG/ML — SIGNIFICANT CHANGE UP (ref 15–150)
FOLATE SERPL-MCNC: 13.7 NG/ML — SIGNIFICANT CHANGE UP (ref 4.7–20)
GLUCOSE SERPL-MCNC: 90 MG/DL — SIGNIFICANT CHANGE UP (ref 70–99)
HCT VFR BLD CALC: 33.8 % — LOW (ref 34.5–45)
HDLC SERPL-MCNC: 28 MG/DL — LOW (ref 45–65)
HGB BLD-MCNC: 11 G/DL — LOW (ref 11.5–15.5)
IMM GRANULOCYTES NFR BLD AUTO: 0.2 % — SIGNIFICANT CHANGE UP (ref 0–1.5)
INR BLD: 1.28 — HIGH (ref 0.88–1.17)
IRON SATN MFR SERPL: 233 UG/DL — SIGNIFICANT CHANGE UP (ref 140–530)
IRON SATN MFR SERPL: 78 UG/DL — SIGNIFICANT CHANGE UP (ref 30–160)
LIPID PNL WITH DIRECT LDL SERPL: 101 MG/DL — SIGNIFICANT CHANGE UP
LYMPHOCYTES # BLD AUTO: 1.85 K/UL — SIGNIFICANT CHANGE UP (ref 1–3.3)
LYMPHOCYTES # BLD AUTO: 40.2 % — SIGNIFICANT CHANGE UP (ref 13–44)
MAGNESIUM SERPL-MCNC: 2 MG/DL — SIGNIFICANT CHANGE UP (ref 1.6–2.6)
MCHC RBC-ENTMCNC: 30.1 PG — SIGNIFICANT CHANGE UP (ref 27–34)
MCHC RBC-ENTMCNC: 32.5 % — SIGNIFICANT CHANGE UP (ref 32–36)
MCV RBC AUTO: 92.6 FL — SIGNIFICANT CHANGE UP (ref 80–100)
MONOCYTES # BLD AUTO: 0.59 K/UL — SIGNIFICANT CHANGE UP (ref 0–0.9)
MONOCYTES NFR BLD AUTO: 12.8 % — SIGNIFICANT CHANGE UP (ref 2–14)
NEUTROPHILS # BLD AUTO: 1.93 K/UL — SIGNIFICANT CHANGE UP (ref 1.8–7.4)
NEUTROPHILS NFR BLD AUTO: 42 % — LOW (ref 43–77)
NRBC # FLD: 0 K/UL — SIGNIFICANT CHANGE UP (ref 0–0)
PHOSPHATE SERPL-MCNC: 3.9 MG/DL — SIGNIFICANT CHANGE UP (ref 2.5–4.5)
PLATELET # BLD AUTO: 245 K/UL — SIGNIFICANT CHANGE UP (ref 150–400)
PMV BLD: 11 FL — SIGNIFICANT CHANGE UP (ref 7–13)
POTASSIUM SERPL-MCNC: 4.5 MMOL/L — SIGNIFICANT CHANGE UP (ref 3.5–5.3)
POTASSIUM SERPL-SCNC: 4.5 MMOL/L — SIGNIFICANT CHANGE UP (ref 3.5–5.3)
PROT SERPL-MCNC: 5.8 G/DL — LOW (ref 6–8.3)
PROTHROM AB SERPL-ACNC: 14.3 SEC — HIGH (ref 9.8–13.1)
RBC # BLD: 3.65 M/UL — LOW (ref 3.8–5.2)
RBC # FLD: 12.4 % — SIGNIFICANT CHANGE UP (ref 10.3–14.5)
SODIUM SERPL-SCNC: 141 MMOL/L — SIGNIFICANT CHANGE UP (ref 135–145)
T4 FREE SERPL-MCNC: 0.96 NG/DL — SIGNIFICANT CHANGE UP (ref 0.9–1.8)
TRIGL SERPL-MCNC: 127 MG/DL — SIGNIFICANT CHANGE UP (ref 10–149)
TSH SERPL-MCNC: 4.37 UIU/ML — HIGH (ref 0.27–4.2)
UIBC SERPL-MCNC: 154.7 UG/DL — SIGNIFICANT CHANGE UP (ref 110–370)
VIT B12 SERPL-MCNC: 498 PG/ML — SIGNIFICANT CHANGE UP (ref 200–900)
WBC # BLD: 4.6 K/UL — SIGNIFICANT CHANGE UP (ref 3.8–10.5)
WBC # FLD AUTO: 4.6 K/UL — SIGNIFICANT CHANGE UP (ref 3.8–10.5)

## 2019-12-18 PROCEDURE — 99239 HOSP IP/OBS DSCHRG MGMT >30: CPT

## 2019-12-18 RX ORDER — LEVOFLOXACIN 5 MG/ML
1 INJECTION, SOLUTION INTRAVENOUS
Qty: 4 | Refills: 0
Start: 2019-12-18 | End: 2019-12-21

## 2019-12-18 RX ADMIN — ERTAPENEM SODIUM 120 MILLIGRAM(S): 1 INJECTION, POWDER, LYOPHILIZED, FOR SOLUTION INTRAMUSCULAR; INTRAVENOUS at 05:04

## 2019-12-18 RX ADMIN — Medication 1 TABLET(S): at 15:00

## 2019-12-18 NOTE — DISCHARGE NOTE PROVIDER - HOSPITAL COURSE
Mrs. Jean Baptiste is a 30 yo postpartum woman ( delivery x 2 months) admitted for management of ESBL E. coli growing in urine cx dated 12/15/19. Patient afebrile while inpatient and with normal WBC. Clinically stable on IV ertapenem, changed to PO levaquin for discharge.        Hospital Course:    Pyelonephritis.      - Patient currently without systemic symptom but given + ESBL E. Coli in Urine Culture and reports of fever at home will treat for pyelonephritis    - UCx (12/15)- E.coli, sens to levaquin; f/u repeat UCx ()--    - d/c IV ertapenem, transition to PO levofloxacin 750mg daily to complete 5 day course.         Cough.      - Dry  Cough, no SOB, NO CP, no recent travel, no sick contact, no Wheezing, R/O Viral syndrome, No Leukocytosis. RVP - negative     - elevated TSH 4.37, T3 wnl 0.96. iron studies/FLP wnl.     - CXR ()- clear, no consolidation, no pleaural effusion/pneumothorax    - symptom management with OTC cough suppressant PRN.         Numerous skin moles    - hep panel pending. routine outpatient dermatology evaluation.     - routine outpatient ob/gyn within 1 week    - routine PMD follow-up, appt on 19 at 4:30pm; repeat TSH, CBC, BMP, Ca         Dispo: home         On 19, case was discussed with Dr. Nunez, patient is medically cleared and optimized for discharge today. All medications were reviewed with attending, and sent to mutually agreed upon pharmacy. Patient has follow up appt with PCP on  at 4:30pm; outpt follow up with GYN and Derm. Mrs. Jean Baptiste is a 30 yo postpartum woman ( delivery x 2 months) admitted for management of ESBL E. coli growing in urine cx dated 12/15/19. Patient afebrile while inpatient and with normal WBC. Clinically stable on IV ertapenem, changed to PO levaquin for discharge.        Hospital Course:    Pyelonephritis.      - Patient currently without systemic symptom but given + ESBL E. Coli in Urine Culture and reports of fever at home will treat for pyelonephritis    - UCx (12/15)- E.coli, sens to levaquin; f/u repeat UCx ()--    - d/c IV ertapenem, transition to PO levofloxacin 750mg daily to complete 5 day course.         Cough.      - Dry  Cough, no SOB, NO CP, no recent travel, no sick contact, no Wheezing, R/O Viral syndrome, No Leukocytosis. RVP - negative     - elevated TSH 4.37, T3 wnl 0.96. iron studies/FLP wnl.     - CXR ()- clear, no consolidation, no pleaural effusion/pneumothorax    - symptom management with OTC cough suppressant PRN.         Numerous skin moles    - hep panel pending. routine outpatient dermatology evaluation.     - routine outpatient ob/gyn within 1 week    - routine PMD follow-up, appt on 19 at 4:30pm; repeat TSH, CBC, BMP, Ca         Dispo: home         On 19, case was discussed with Dr. Nunez, patient is medically cleared and optimized for discharge today. All medications were reviewed with attending, and sent to mutually agreed upon pharmacy. Patient has follow up appt with PCP on  at 4:30pm; outpt follow up with GYN and Derm. Called and confirmed with pharmacy, levaquin covered.

## 2019-12-18 NOTE — PROGRESS NOTE ADULT - SUBJECTIVE AND OBJECTIVE BOX
Patient is a 29y old  Female who presents with a chief complaint of Pyelo, ESBL E Coli , + Urine Culture, + Cough, Fever at home, (17 Dec 2019 19:58)      SUBJECTIVE / OVERNIGHT EVENTS:  Patient states she feels well today. No n/v and eating well. She reports intermittent pain at  surgical site and reddish vaginal discharge. No fevers or chills in past 24 hours.    MEDICATIONS  (STANDING):  ertapenem  IVPB 1000 milliGRAM(s) IV Intermittent every 24 hours  lactated ringers. 1000 milliLiter(s) (75 mL/Hr) IV Continuous <Continuous>  prenatal multivitamin 1 Tablet(s) Oral daily    MEDICATIONS  (PRN):  acetaminophen   Tablet .. 650 milliGRAM(s) Oral every 6 hours PRN Temp greater or equal to 38C (100.4F), Moderate Pain (4 - 6), Severe Pain (7 - 10)  simethicone 80 milliGRAM(s) Chew four times a day PRN Gas      Vital Signs Last 24 Hrs  T(C): 36.4 (18 Dec 2019 05:03), Max: 36.5 (17 Dec 2019 22:28)  T(F): 97.6 (18 Dec 2019 05:03), Max: 97.7 (17 Dec 2019 22:28)  HR: 97 (18 Dec 2019 05:03) (66 - 97)  BP: 102/61 (18 Dec 2019 05:03) (100/76 - 114/69)  BP(mean): --  RR: 18 (18 Dec 2019 05:03) (16 - 18)  SpO2: 97% (18 Dec 2019 05:03) (97% - 100%)  CAPILLARY BLOOD GLUCOSE        I&O's Summary    17 Dec 2019 07:01  -  18 Dec 2019 07:00  --------------------------------------------------------  IN: 300 mL / OUT: 500 mL / NET: -200 mL          PHYSICAL EXAM  GENERAL: NAD, well-developed  CHEST/LUNG: Clear to auscultation bilaterally; No wheeze  HEART: Regular rate and rhythm; No murmurs, rubs, or gallops  ABDOMEN: Soft, Nontender, Nondistended; Bowel sounds present. Healing  surgical site without surrounding erythema  EXTREMITIES: No clubbing, cyanosis, or edema. ROM intact  PSYCH: AAOx3  SKIN: Multiple and scattered hyperpigmented macules and papules          LABS:                        11.0   4.60  )-----------( 245      ( 18 Dec 2019 05:20 )             33.8     12-18    141  |  107  |  8   ----------------------------<  90  4.5   |  21<L>  |  0.49<L>    Ca    8.2<L>      18 Dec 2019 05:20  Phos  3.9     1218  Mg     2.0     -18    TPro  5.8<L>  /  Alb  3.3  /  TBili  < 0.2<L>  /  DBili  x   /  AST  25  /  ALT  43<H>  /  AlkPhos  71  12-18      PT/INR - ( 18 Dec 2019 05:20 )   PT: 14.3 SEC;   INR: 1.28          PTT - ( 18 Dec 2019 05:20 )  PTT:30.8 SEC      Urinalysis Basic - ( 17 Dec 2019 17:46 )    Color: YELLOW / Appearance: Lt TURBID / S.023 / pH: 6.5  Gluc: NEGATIVE / Ketone: NEGATIVE  / Bili: NEGATIVE / Urobili: NORMAL   Blood: MODERATE / Protein: 20 / Nitrite: NEGATIVE   Leuk Esterase: SMALL / RBC: 6-10 / WBC 6-10   Sq Epi: FEW / Non Sq Epi: x / Bacteria: NEGATIVE        VBG -17 @ 17:46  pH: 7.36/pCO2: 48 /pO2: 44/HCO3: 25/lactate: 1.3      Culture - Blood (collected 15 Dec 2019 14:20)  Source: Peripheral Site 2  Preliminary Report (17 Dec 2019 14:20):    NO ORGANISMS ISOLATED    NO ORGANISMS ISOLATED AT 48 HRS.    Culture - Blood (collected 15 Dec 2019 14:20)  Source: Peripheral Site 1  Preliminary Report (17 Dec 2019 14:20):    NO ORGANISMS ISOLATED    NO ORGANISMS ISOLATED AT 48 HRS.    Culture - Urine (collected 15 Dec 2019 14:03)  Source: URINE MIDSTREAM  Preliminary Report (16 Dec 2019 13:11):    EC^Escherichia coli    COLONY COUNT: > = 100,000 CFU/ML  Final Report (17 Dec 2019 11:59):    TESTS: ESBL    CALLED TO: MARNI GAMBOA MD    READ BACK (2 Patient Identifiers) (Y/N): Y    READ BACK VALUES (Y/N): Y    DATE / TIME CALLED: 19 1158    CALLED BY: DEE DEE HEALY  Organism: E.COLI ESBL POSITIVE (17 Dec 2019 11:59)  Organism: E.COLI ESBL POSITIVE  COLONY COUNT: > = 100,000 CFU/ML (17 Dec 2019 11:59)

## 2019-12-18 NOTE — DISCHARGE NOTE PROVIDER - NSFOLLOWUPCLINICS_GEN_ALL_ED_FT
[facility] - [place]  [facility]atology  [address], [address]  [place] [address]  Phone: [phone]  Fax: [phone]  Follow Up Time: Routine    [facility]ne[facility] Obst[facility]  Gynceology/OB  [address] [place] [address]  Phone: [phone]  Fax:   Follow Up Time:

## 2019-12-18 NOTE — DISCHARGE NOTE PROVIDER - PROVIDER TOKENS
FREE:[LAST:[FOLLOW UP APPT],PHONE:[(   )    -],FAX:[(   )    -],ADDRESS:[**you have an appt with your doctor Dr. Aguilar on 12/25/19 at 4:30pm, please call office to confirm/reschedule, [phone]]]

## 2019-12-18 NOTE — DISCHARGE NOTE NURSING/CASE MANAGEMENT/SOCIAL WORK - PATIENT PORTAL LINK FT
You can access the FollowMyHealth Patient Portal offered by [facility] by registering at the following website: [email or web address]. By joining [employer]’s FollowMyHealth portal, you will also be able to view your health information using other applications (apps) compatible with our system.

## 2019-12-18 NOTE — DISCHARGE NOTE PROVIDER - NSDCCPCAREPLAN_GEN_ALL_CORE_FT
PRINCIPAL DISCHARGE DIAGNOSIS  Diagnosis: Pyelonephritis  Assessment and Plan of Treatment: You were started on antibiotics for a urinary infection; please continue antibiotics as directed:  levofloxacin 750mg daily to complete course.   To help prevent future infections maintain healthy hygiene and avoid taking long baths. Wipe from front to back and empty your bladder frequently by keeping yourself properly hydrated. Monitor for signs/symptoms of infection, such as, fever/chills, burning/pain with urination, urinary frequency/hesitancy, cloudy urine, or blood in urine and follow-up with your primary care provider as outpatient for further care.      SECONDARY DISCHARGE DIAGNOSES  Diagnosis: Cough  Assessment and Plan of Treatment: Stable. You presented with dry cough, workup negative for acute findings. symptom management with over-the-counter cough suppressant as needed.   *Your TSH level was noted to be elevated, free thyroxine normal. Recommend you do a repeat TSH level with your primary care doctor as outpatient. Repeat labs (CBC, BMP, Ca) at your follow up appt with Dr. Aguilar on 12/25/19 at 4:30pm, please call office to confirm/reschedule, [phone].    Diagnosis: Numerous skin moles  Assessment and Plan of Treatment: Numerous skin moles noted, please follow up with outpatient Dermatology for routine check-up and management.   *routine outpatient ob/gyn within 1 week

## 2019-12-18 NOTE — PROGRESS NOTE ADULT - PROBLEM SELECTOR PLAN 2
RVP Negative. Chest X ray normal. Not currently complaining of cough  - symptom management with OTC cough suppressant PRN

## 2019-12-18 NOTE — DISCHARGE NOTE PROVIDER - CARE PROVIDER_API CALL
FOLLOW UP APPT,   **you have an appt with your doctor Dr. Aguilar on 12/25/19 at 4:30pm, please call office to confirm/reschedule, [phone]  Phone: (   )    -  Fax: (   )    -  Follow Up Time:

## 2019-12-18 NOTE — PROGRESS NOTE ADULT - ASSESSMENT
Mrs. Jean Baptiste is a 30 yo postpartum woman ( delivery x 2 months) admitted for management of ESBL E. coli growing in urine cx dated 12/15/19. Patient afebrile while inpatient and with normal WBC. Clinically stable on IV ertapenem.

## 2019-12-18 NOTE — DISCHARGE NOTE PROVIDER - NSDCFUADDAPPT_GEN_ALL_CORE_FT
**you have an appt with your doctor Dr. Aguilar on 12/25/19 at 4:30pm, please call office to confirm/reschedule, [phone]

## 2019-12-18 NOTE — PROGRESS NOTE ADULT - PROBLEM SELECTOR PLAN 5
Transitions of Care Status:  1.  Name of PCP: Dr. Lopez, [phone]  2.  PCP Contacted on Admission: [ ] Y    [ ] N    3.  PCP contacted at Discharge: [ x] Y    [ ] N    [ ] N/A  4.  Post-Discharge Appointment Date and Location: 12/25/19 at 4:30pm  5.  Summary of Handoff given to   since PCP not in office on 12/18/19

## 2019-12-18 NOTE — PROGRESS NOTE ADULT - PROBLEM SELECTOR PLAN 1
Patient currently without systemic symptom but given + ESBL E. Coli in Urine Culture and reports of fever at home will treat for pyelonephritis  - d/c IV ertapenem, transition to levofloxacin 750mg daily to complete 5 day course.

## 2019-12-18 NOTE — DISCHARGE NOTE PROVIDER - NSDCMRMEDTOKEN_GEN_ALL_CORE_FT
Gas-X Extra Strength 125 mg oral capsule: 1 cap(s) orally 2 times a day, As Needed  ibuprofen 400 mg oral tablet: 1 tab(s) orally every 8 hours, As Needed  levoFLOXacin 750 mg oral tablet: 1 tab(s) orally every 24 hours   take in AM, last day 12/22/19   Prenatal 1 oral capsule: orally once a day  Tylenol 500 mg oral tablet: 1 tab(s) orally every 6 hours, As Needed

## 2019-12-19 LAB
BACTERIA UR CULT: SIGNIFICANT CHANGE UP
HAV IGM SER-ACNC: NONREACTIVE — SIGNIFICANT CHANGE UP
HBV CORE IGM SER-ACNC: NONREACTIVE — SIGNIFICANT CHANGE UP
HBV SURFACE AG SER-ACNC: NONREACTIVE — SIGNIFICANT CHANGE UP
HCV AB S/CO SERPL IA: 0.15 S/CO — SIGNIFICANT CHANGE UP (ref 0–0.99)
HCV AB SERPL-IMP: SIGNIFICANT CHANGE UP
SPECIMEN SOURCE: SIGNIFICANT CHANGE UP

## 2019-12-20 LAB
BACTERIA BLD CULT: SIGNIFICANT CHANGE UP
BACTERIA BLD CULT: SIGNIFICANT CHANGE UP

## 2020-08-26 ENCOUNTER — APPOINTMENT (OUTPATIENT)
Dept: OTOLARYNGOLOGY | Facility: CLINIC | Age: 30
End: 2020-08-26
Payer: MEDICAID

## 2020-08-26 VITALS — WEIGHT: 112 LBS | HEIGHT: 63 IN | TEMPERATURE: 98.4 F | BODY MASS INDEX: 19.84 KG/M2

## 2020-08-26 DIAGNOSIS — H61.22 IMPACTED CERUMEN, LEFT EAR: ICD-10-CM

## 2020-08-26 PROCEDURE — 99204 OFFICE O/P NEW MOD 45 MIN: CPT | Mod: 25

## 2020-08-26 PROCEDURE — 69210 REMOVE IMPACTED EAR WAX UNI: CPT

## 2020-08-26 RX ORDER — MOMETASONE FUROATE 1 MG/ML
0.1 SOLUTION TOPICAL
Qty: 1 | Refills: 0 | Status: ACTIVE | COMMUNITY
Start: 2020-08-26 | End: 1900-01-01

## 2020-08-26 RX ORDER — FLUOCINOLONE ACETONIDE 0.11 MG/ML
0.01 OIL AURICULAR (OTIC)
Qty: 1 | Refills: 1 | Status: ACTIVE | COMMUNITY
Start: 2020-08-26 | End: 1900-01-01

## 2020-08-26 NOTE — PHYSICAL EXAM
[Binocular Microscopic Exam] : Binocular microscopic exam was performed [Midline] : trachea located in midline position [Normal] : no rashes [FreeTextEntry8] : narrow [FreeTextEntry9] : dry flaky cerumen

## 2020-08-26 NOTE — HISTORY OF PRESENT ILLNESS
[de-identified] : 30 y/o woman with left recurring otorrhea.  Present for years, worsened in past 6 months.  Has tried debrox with minor improvement. Denies subjective hearing loss.  Associated itching.

## 2020-08-26 NOTE — PROCEDURE
[] : Removal of Cerumen [FreeTextEntry6] : Cerumen was removed under binocular microscopy with a combination of a suction and/or a loop curette. The patient tolerated the procedure well and there were no complications. The included findings were noted.\par  [FreeTextEntry3] : Procedure note:  Left cerumenectomy\par \par Aug 26, 2020 \par \par Description of Procedure:  Cerumen was noted requiring debridement under the operating microscope using otologic instrumentation.  The patient tolerated the procedure without complications.\par \par

## 2020-09-18 ENCOUNTER — OUTPATIENT (OUTPATIENT)
Dept: OUTPATIENT SERVICES | Facility: HOSPITAL | Age: 30
LOS: 1 days | Discharge: HOME | End: 2020-09-18

## 2020-09-18 ENCOUNTER — APPOINTMENT (OUTPATIENT)
Dept: OTOLARYNGOLOGY | Facility: CLINIC | Age: 30
End: 2020-09-18
Payer: MEDICAID

## 2020-09-18 DIAGNOSIS — H61.22 IMPACTED CERUMEN, LEFT EAR: ICD-10-CM

## 2020-09-18 DIAGNOSIS — Z98.891 HISTORY OF UTERINE SCAR FROM PREVIOUS SURGERY: Chronic | ICD-10-CM

## 2020-09-18 DIAGNOSIS — H93.292 OTHER ABNORMAL AUDITORY PERCEPTIONS, LEFT EAR: ICD-10-CM

## 2020-09-18 DIAGNOSIS — H92.12 OTORRHEA, LEFT EAR: ICD-10-CM

## 2020-09-18 PROCEDURE — 99213 OFFICE O/P EST LOW 20 MIN: CPT | Mod: 25

## 2020-09-18 PROCEDURE — 92504 EAR MICROSCOPY EXAMINATION: CPT

## 2020-09-18 NOTE — PHYSICAL EXAM
[Binocular Microscopic Exam] : Binocular microscopic exam was performed [Normal] : no rashes [Hearing Loss Right Only] : normal [Hearing Loss Left Only] : normal [FreeTextEntry7] : minor wax/dried skin accumulation medial ear canal removed

## 2020-09-18 NOTE — HISTORY OF PRESENT ILLNESS
[de-identified] : 28 y/o woman here for f/u left ear pruritus and otorrhea.  Using mometasone with good relief of symptoms, improvement in itching.

## 2020-09-18 NOTE — PROCEDURE
[] : Removal of Cerumen [FreeTextEntry3] : Procedure note:  Left cerumenectomy\par \par Sep 18, 2020 \par \par Description of Procedure:  Cerumen impaction was noted requiring debridement under the operating microscope using otologic instrumentation.  The patient tolerated the procedure without complications.\par \par Procedure note:  Binocular microscopy\par \par Sep 18, 2020 \par \par Inspection of the ears was performed under binocular microscopy because of need to accurately visualize otologic landmarks and potential pathologic conditions that would not otherwise be visible through simple otoscopic exam.\par  [FreeTextEntry6] : Cerumen was removed under binocular microscopy with a combination of a suction and/or a loop curette. The patient tolerated the procedure well and there were no complications. The included findings were noted.\par

## 2020-09-18 NOTE — REASON FOR VISIT
[Subsequent Evaluation] : a subsequent evaluation for [FreeTextEntry2] : left ear pruritus and otorrhea

## 2020-09-22 DIAGNOSIS — H93.292 OTHER ABNORMAL AUDITORY PERCEPTIONS, LEFT EAR: ICD-10-CM

## 2020-09-22 DIAGNOSIS — H61.22 IMPACTED CERUMEN, LEFT EAR: ICD-10-CM

## 2020-09-22 DIAGNOSIS — H92.12 OTORRHEA, LEFT EAR: ICD-10-CM

## 2020-12-21 PROBLEM — Z87.440 HISTORY OF URINARY TRACT INFECTION: Status: RESOLVED | Noted: 2019-10-21 | Resolved: 2020-12-21

## 2021-02-09 NOTE — ED CDU PROVIDER DISPOSITION NOTE - ATTENDING CONTRIBUTION TO CARE
You can access the FollowMyHealth Patient Portal offered by [facility] by registering at the following website: [email or web address]. By joining [employer]’s FollowMyHealth portal, you will also be able to view your health information using other applications (apps) compatible with our system.
Patient in no acute distress, intermittent fevers however defervesce with tylenol, otherwise patient stable, plan for d/c with f/u with PMD. Blood cultures reports are negative.    MADHAVI Berman: I have personally performed a face to face bedside history and physical examination of this patient. I have discussed the history, examination, review of systems, assessment and plan of management with the resident. I have reviewed the electronic medical record and amended it to reflect my history, review of systems, physical exam, assessment and plan.

## 2021-06-03 ENCOUNTER — EMERGENCY (EMERGENCY)
Facility: HOSPITAL | Age: 31
LOS: 1 days | Discharge: HOME | End: 2021-06-03
Admitting: STUDENT IN AN ORGANIZED HEALTH CARE EDUCATION/TRAINING PROGRAM
Payer: MEDICAID

## 2021-06-03 VITALS
SYSTOLIC BLOOD PRESSURE: 123 MMHG | HEIGHT: 63 IN | HEART RATE: 104 BPM | OXYGEN SATURATION: 100 % | RESPIRATION RATE: 16 BRPM | DIASTOLIC BLOOD PRESSURE: 72 MMHG | TEMPERATURE: 99 F

## 2021-06-03 VITALS
OXYGEN SATURATION: 100 % | HEART RATE: 88 BPM | RESPIRATION RATE: 16 BRPM | TEMPERATURE: 99 F | DIASTOLIC BLOOD PRESSURE: 69 MMHG | SYSTOLIC BLOOD PRESSURE: 105 MMHG

## 2021-06-03 DIAGNOSIS — Z98.891 HISTORY OF UTERINE SCAR FROM PREVIOUS SURGERY: Chronic | ICD-10-CM

## 2021-06-03 LAB
ALBUMIN SERPL ELPH-MCNC: 4 G/DL — SIGNIFICANT CHANGE UP (ref 3.3–5)
ALP SERPL-CCNC: 63 U/L — SIGNIFICANT CHANGE UP (ref 40–120)
ALT FLD-CCNC: 22 U/L — SIGNIFICANT CHANGE UP (ref 4–33)
ANION GAP SERPL CALC-SCNC: 11 MMOL/L — SIGNIFICANT CHANGE UP (ref 7–14)
APPEARANCE UR: ABNORMAL
AST SERPL-CCNC: 17 U/L — SIGNIFICANT CHANGE UP (ref 4–32)
BACTERIA # UR AUTO: NEGATIVE — SIGNIFICANT CHANGE UP
BASE EXCESS BLDV CALC-SCNC: 0.7 MMOL/L — SIGNIFICANT CHANGE UP (ref -3–2)
BASOPHILS # BLD AUTO: 0.03 K/UL — SIGNIFICANT CHANGE UP (ref 0–0.2)
BASOPHILS NFR BLD AUTO: 0.4 % — SIGNIFICANT CHANGE UP (ref 0–2)
BILIRUB SERPL-MCNC: 0.2 MG/DL — SIGNIFICANT CHANGE UP (ref 0.2–1.2)
BILIRUB UR-MCNC: NEGATIVE — SIGNIFICANT CHANGE UP
BLOOD GAS VENOUS - CREATININE: 0.6 MG/DL — SIGNIFICANT CHANGE UP (ref 0.5–1.3)
BLOOD GAS VENOUS COMPREHENSIVE RESULT: SIGNIFICANT CHANGE UP
BUN SERPL-MCNC: 6 MG/DL — LOW (ref 7–23)
CALCIUM SERPL-MCNC: 8.5 MG/DL — SIGNIFICANT CHANGE UP (ref 8.4–10.5)
CHLORIDE BLDV-SCNC: 105 MMOL/L — SIGNIFICANT CHANGE UP (ref 96–108)
CHLORIDE SERPL-SCNC: 102 MMOL/L — SIGNIFICANT CHANGE UP (ref 98–107)
CO2 SERPL-SCNC: 23 MMOL/L — SIGNIFICANT CHANGE UP (ref 22–31)
COLOR SPEC: ABNORMAL
CREAT SERPL-MCNC: 0.65 MG/DL — SIGNIFICANT CHANGE UP (ref 0.5–1.3)
DIFF PNL FLD: ABNORMAL
EOSINOPHIL # BLD AUTO: 0.12 K/UL — SIGNIFICANT CHANGE UP (ref 0–0.5)
EOSINOPHIL NFR BLD AUTO: 1.8 % — SIGNIFICANT CHANGE UP (ref 0–6)
EPI CELLS # UR: 3 /HPF — SIGNIFICANT CHANGE UP (ref 0–5)
GAS PNL BLDV: 139 MMOL/L — SIGNIFICANT CHANGE UP (ref 136–146)
GLUCOSE BLDV-MCNC: 101 MG/DL — HIGH (ref 70–99)
GLUCOSE SERPL-MCNC: 102 MG/DL — HIGH (ref 70–99)
GLUCOSE UR QL: NEGATIVE — SIGNIFICANT CHANGE UP
HCG SERPL-ACNC: <5 MIU/ML — SIGNIFICANT CHANGE UP
HCO3 BLDV-SCNC: 24 MMOL/L — SIGNIFICANT CHANGE UP (ref 20–27)
HCT VFR BLD CALC: 33.8 % — LOW (ref 34.5–45)
HCT VFR BLDA CALC: 34 % — LOW (ref 34.5–46.5)
HGB BLD CALC-MCNC: 11 G/DL — LOW (ref 11.5–15.5)
HGB BLD-MCNC: 10.9 G/DL — LOW (ref 11.5–15.5)
HYALINE CASTS # UR AUTO: 1 /LPF — SIGNIFICANT CHANGE UP (ref 0–7)
IANC: 4.04 K/UL — SIGNIFICANT CHANGE UP (ref 1.5–8.5)
IMM GRANULOCYTES NFR BLD AUTO: 0.3 % — SIGNIFICANT CHANGE UP (ref 0–1.5)
KETONES UR-MCNC: NEGATIVE — SIGNIFICANT CHANGE UP
LACTATE BLDV-MCNC: 1.2 MMOL/L — SIGNIFICANT CHANGE UP (ref 0.5–2)
LEUKOCYTE ESTERASE UR-ACNC: ABNORMAL
LYMPHOCYTES # BLD AUTO: 1.88 K/UL — SIGNIFICANT CHANGE UP (ref 1–3.3)
LYMPHOCYTES # BLD AUTO: 27.5 % — SIGNIFICANT CHANGE UP (ref 13–44)
MCHC RBC-ENTMCNC: 27.1 PG — SIGNIFICANT CHANGE UP (ref 27–34)
MCHC RBC-ENTMCNC: 32.2 GM/DL — SIGNIFICANT CHANGE UP (ref 32–36)
MCV RBC AUTO: 84.1 FL — SIGNIFICANT CHANGE UP (ref 80–100)
MONOCYTES # BLD AUTO: 0.75 K/UL — SIGNIFICANT CHANGE UP (ref 0–0.9)
MONOCYTES NFR BLD AUTO: 11 % — SIGNIFICANT CHANGE UP (ref 2–14)
NEUTROPHILS # BLD AUTO: 4.04 K/UL — SIGNIFICANT CHANGE UP (ref 1.8–7.4)
NEUTROPHILS NFR BLD AUTO: 59 % — SIGNIFICANT CHANGE UP (ref 43–77)
NITRITE UR-MCNC: NEGATIVE — SIGNIFICANT CHANGE UP
NRBC # BLD: 0 /100 WBCS — SIGNIFICANT CHANGE UP
NRBC # FLD: 0 K/UL — SIGNIFICANT CHANGE UP
PCO2 BLDV: 42 MMHG — SIGNIFICANT CHANGE UP (ref 41–51)
PH BLDV: 7.39 — SIGNIFICANT CHANGE UP (ref 7.32–7.43)
PH UR: 6 — SIGNIFICANT CHANGE UP (ref 5–8)
PLATELET # BLD AUTO: 261 K/UL — SIGNIFICANT CHANGE UP (ref 150–400)
PO2 BLDV: 36 MMHG — SIGNIFICANT CHANGE UP (ref 35–40)
POTASSIUM BLDV-SCNC: 3.6 MMOL/L — SIGNIFICANT CHANGE UP (ref 3.4–4.5)
POTASSIUM SERPL-MCNC: 3.9 MMOL/L — SIGNIFICANT CHANGE UP (ref 3.5–5.3)
POTASSIUM SERPL-SCNC: 3.9 MMOL/L — SIGNIFICANT CHANGE UP (ref 3.5–5.3)
PROT SERPL-MCNC: 7.5 G/DL — SIGNIFICANT CHANGE UP (ref 6–8.3)
PROT UR-MCNC: ABNORMAL
RBC # BLD: 4.02 M/UL — SIGNIFICANT CHANGE UP (ref 3.8–5.2)
RBC # FLD: 13.2 % — SIGNIFICANT CHANGE UP (ref 10.3–14.5)
RBC CASTS # UR COMP ASSIST: >720 /HPF — HIGH (ref 0–4)
SAO2 % BLDV: 63.9 % — SIGNIFICANT CHANGE UP (ref 60–85)
SODIUM SERPL-SCNC: 136 MMOL/L — SIGNIFICANT CHANGE UP (ref 135–145)
SP GR SPEC: 1.01 — SIGNIFICANT CHANGE UP (ref 1.01–1.02)
UROBILINOGEN FLD QL: SIGNIFICANT CHANGE UP
WBC # BLD: 6.84 K/UL — SIGNIFICANT CHANGE UP (ref 3.8–10.5)
WBC # FLD AUTO: 6.84 K/UL — SIGNIFICANT CHANGE UP (ref 3.8–10.5)
WBC UR QL: 15 /HPF — HIGH (ref 0–5)

## 2021-06-03 PROCEDURE — 99284 EMERGENCY DEPT VISIT MOD MDM: CPT

## 2021-06-03 RX ORDER — IBUPROFEN 200 MG
1 TABLET ORAL
Qty: 20 | Refills: 0
Start: 2021-06-03

## 2021-06-03 RX ORDER — CEFTRIAXONE 500 MG/1
1000 INJECTION, POWDER, FOR SOLUTION INTRAMUSCULAR; INTRAVENOUS ONCE
Refills: 0 | Status: COMPLETED | OUTPATIENT
Start: 2021-06-03 | End: 2021-06-03

## 2021-06-03 RX ORDER — SODIUM CHLORIDE 9 MG/ML
1000 INJECTION INTRAMUSCULAR; INTRAVENOUS; SUBCUTANEOUS ONCE
Refills: 0 | Status: COMPLETED | OUTPATIENT
Start: 2021-06-03 | End: 2021-06-03

## 2021-06-03 RX ORDER — CEFPODOXIME PROXETIL 100 MG
1 TABLET ORAL
Qty: 20 | Refills: 0
Start: 2021-06-03

## 2021-06-03 RX ADMIN — SODIUM CHLORIDE 1000 MILLILITER(S): 9 INJECTION INTRAMUSCULAR; INTRAVENOUS; SUBCUTANEOUS at 23:07

## 2021-06-03 RX ADMIN — CEFTRIAXONE 100 MILLIGRAM(S): 500 INJECTION, POWDER, FOR SOLUTION INTRAMUSCULAR; INTRAVENOUS at 23:07

## 2021-06-03 RX ADMIN — SODIUM CHLORIDE 1000 MILLILITER(S): 9 INJECTION INTRAMUSCULAR; INTRAVENOUS; SUBCUTANEOUS at 21:20

## 2021-06-03 NOTE — ED PROVIDER NOTE - OBJECTIVE STATEMENT
patient is a 29 y/o F with no sig pmhx presenting with a c/o foul smelling urine, with subjective fevers x 4 days. Patient was evaluated by PCP for same complaints, rx Levoquin 2 days ago, patient compliant with abx but states that she is still have fevers. She denies dysuria, flank pain, n/v, chills.

## 2021-06-03 NOTE — ED PROVIDER NOTE - CLINICAL SUMMARY MEDICAL DECISION MAKING FREE TEXT BOX
patient dx with UTI currently on Levaquin presenting with a complaint of subjective fevers. patient is nontoxic appearing, vitals stable, will obtain routine labs, UA, IV abx.

## 2021-06-03 NOTE — ED ADULT NURSE NOTE - NSIMPLEMENTINTERV_GEN_ALL_ED
Implemented All Universal Safety Interventions:  Tina to call system. Call bell, personal items and telephone within reach. Instruct patient to call for assistance. Room bathroom lighting operational. Non-slip footwear when patient is off stretcher. Physically safe environment: no spills, clutter or unnecessary equipment. Stretcher in lowest position, wheels locked, appropriate side rails in place.

## 2021-06-03 NOTE — ED ADULT NURSE NOTE - OBJECTIVE STATEMENT
pt is A&Ox3, ambulatory, able to make needs known and presents as per PMD for further evaluation of possible pyelonephritis. Pt reports right ab pain that radiates to her flank area, with vaginal discharge, fever, foul smelling urine and a cough x 1 week. IV to left A/C #18, labs sent

## 2021-06-03 NOTE — ED PROVIDER NOTE - PROGRESS NOTE DETAILS
LEXII Nunez: labs reviewed. no gross abnormal values. patient remained afebrile. patient clinically stable for discharge. antibiotics will be sent to pharmacy. return precautions discussed

## 2021-06-03 NOTE — ED ADULT TRIAGE NOTE - IDEAL BODY WEIGHT(KG)
52
patient finger soaked, lesion on palmar aspect of third digit slightly firm but no obvious FB, no FB on xray, will treat with abx and send for hand f/u -Kaila Sheldon PA-C

## 2021-06-03 NOTE — ED PROVIDER NOTE - PATIENT PORTAL LINK FT
You can access the FollowMyHealth Patient Portal offered by [facility] by registering at the following website: [email or web address]. By joining [employer]’s FollowMyHealth portal, you will also be able to view your health information using other applications (apps) compatible with our system.

## 2021-06-03 NOTE — ED ADULT TRIAGE NOTE - CHIEF COMPLAINT QUOTE
Sent from MD for IV abx. States she was diagnosed with pyelonephritis a few days ago. No improvement with Levaquin. c/o left flank pain, n/v, foul-smelling urine and fevers. Also c/o cough starting yesterday.

## 2021-06-04 LAB
CULTURE RESULTS: NO GROWTH — SIGNIFICANT CHANGE UP
SPECIMEN SOURCE: SIGNIFICANT CHANGE UP

## 2021-06-04 RX ORDER — CEFDINIR 250 MG/5ML
1 POWDER, FOR SUSPENSION ORAL
Qty: 20 | Refills: 0
Start: 2021-06-04 | End: 2021-06-13

## 2021-06-04 RX ADMIN — CEFTRIAXONE 1000 MILLIGRAM(S): 500 INJECTION, POWDER, FOR SOLUTION INTRAMUSCULAR; INTRAVENOUS at 00:05

## 2021-06-04 NOTE — ED POST DISCHARGE NOTE - RESULT SUMMARY
LEXII Singh: Pt's pharmacy called, state Cefpodoxime is not covered. Cefdinir 300mg BID x 10 days sent instead, covered by insurance.

## 2021-06-09 LAB
CULTURE RESULTS: SIGNIFICANT CHANGE UP
CULTURE RESULTS: SIGNIFICANT CHANGE UP
SPECIMEN SOURCE: SIGNIFICANT CHANGE UP
SPECIMEN SOURCE: SIGNIFICANT CHANGE UP

## 2021-06-29 NOTE — ED ADULT TRIAGE NOTE - AS TEMP SITE
oral Advancement Flap (Single) Text: The defect edges were debeveled with a #15 scalpel blade.  Given the location of the defect and the proximity to free margins a single advancement flap was deemed most appropriate.  Using a sterile surgical marker, an appropriate advancement flap was drawn incorporating the defect and placing the expected incisions within the relaxed skin tension lines where possible.    The area thus outlined was incised deep to adipose tissue with a #15 scalpel blade.  The skin margins were undermined to an appropriate distance in all directions utilizing iris scissors.

## 2022-01-05 NOTE — PROGRESS NOTE ADULT - SUBJECTIVE AND OBJECTIVE BOX
Postop Day  __1_ s/p   C- Section    THERAPY:  [ x ] Spinal morphine   (  ) mg  on (        )  [  ] Epidural morphine   [  ] IV PCA Hydromorphone 1 mg/ml      Sedation Score:	  [ x ] Alert	    [  ] Drowsy        [  ] Arousable	[  ] Asleep	[  ] Unresponsive    Side Effects:	  [ x ] None	     [  ] Nausea        [  ] Pruritus        [  ] Weakness   [  ] Numbness        ASSESSMENT/ PLAN   [   ] Discontinue         [  ] Continue  [ x ]Documentation and Verification of current medications     Comments: Transitioned to prn oral analgesics by primary team. No anesthetic complication. .

## 2022-10-07 NOTE — ED ADULT NURSE NOTE - OBJECTIVE STATEMENT
Occupational Therapy    Visit Type: initial evaluation and discharge  Co-treat with: Physical therapist (partial)  Precautions:  Medical precautions:  fall risk; standard precautions.    Lines:       Lines in chart and on patient reviewed, precautions maintained throughout session.  Spine:     Lumbar: lumbar brace on when out of bed, lumbar brace optional for bathroom, no hip flexion past 90 degree, no lifting greater than 10 #, no twisting, no bending and LSO (able to don in sitting)  Safety Measures: bed alarm and chair alarm    SUBJECTIVE  Patient agreed to participate in therapy this date.  Patient verbally agrees to allow the following to be present during session: student    Patient is a 42 year old female admitted to [facility] for L3-4 lumbar fusion.  Pt lives with spouse and children in two level home with bedroom and bathroom on second level, but has half bath downstairs. At baseline, patient is Independent with ADLs and Independent with functional mobility tasks using no assistive device.    Patient / Family Goal: return to previous functional status, maximize function and return home    OBJECTIVE   Level of consciousness: alert    Oriented to person, place, time and situation     Arousal alertness: appropriate responses to stimuli    Affect/Behavior: alert, appropriate, calm, cooperative and pleasant  Patient activity tolerance: 1 to 2 activity to rest  Functional Communication/Cognition    Overall status:  Within functional limits    Form of communication:  Verbal     Attention span:  Appears intact    Attention Span Impairment: perseveration    Transition between tasks: transitions tasks without difficulty.    Safety judgement: good awareness of safety precautions.    Awareness of deficits: fully aware of deficits.  Additional Details: Pt's pain created limitations in attention, but otherwise able to attend appropriately  Strength (out of 5 unless otherwise indicated)  WFL: ALLYEJOE  Balance (trials in sec  unless otherwise indicated)  Sitting:   - Static:  stand by assist double lower extremity support and back unsupported    - Dynamic:  stand by assist double lower extremity support and back unsupported  Standing - Firm Surface - Eyes Open:    - Static: stand by assist double upper extremity support   - Dynamic:  stand by assist reaches with 2 hands  Comments/Details: Requiring UE support on 2ww due to pain.       Transfers:    Assistive devices: gait belt and 2-wheeled walker    Sit to stand: stand by assist and with verbal cues    Stand to sit: stand by assist and with verbal cues    Bed to chair: stand by assist and with verbal cues, type: stand pivot    Training completed:    Tasks: sit to stand, stand to sit and stand pivot    Education details: body mechanics, patient safety, patient verbalizes precautions and patient able to maintain precautions  Functional Ambulation:    Assistance:stand by assist and with verbal cues   Assistive device:gait belt and 2-wheeled walker    Distance (ft): 20;20    Surface: even      Education details: body mechanics, patient safety and patient demonstrates understanding  Details/Comments: Pt ambulated throughout room>bathroom>chair with SBA with 2ww for safety. Pt limited by pain, but otherwise able to ambulate safely. Requiring use of walker for support due to RLE and back pain, as well as balance.   Activities of Daily Living (ADLs):  Grooming/Oral Hygiene:     Grooming assist: stand by assist and with verbal cues    Position: standing at sink    Assist needed for: verbal cueing, supervision/safety and wash/dry hands  Lower Body Dressing:     Assist: supervision and with verbal cues (Did not stand to pull up)    Position: chair    Footwear assistance: supervision    Footwear position: chair    Assist needed for: steadying, verbal cueing, supervision/safety and use of adaptive equipment    Equipment: reacher and sock aid  Toilet transfer:     Assist: stand by assist    Device:  gait belt and 2-wheeled walker    Equipment: grab bar use  Toileting:     Assist: stand by assist and with verbal cues    Assist needed for: verbal cueing and supervision/safety    Equipment: grab bar use  Activities of daily living training:   Pt completed grooming and toileting tasks using 2ww with SBA for safety. Given cues when simulating toileting hygiene to adhere to lumbar precautions; verbalized understanding. Educated on reacher and sock aide for LB dressing.  Required cues and demo for technique. Demonstrated ability to thread feet with reacher; did not stand to pull them out        Interventions    Training provided: activity tolerance, ADL training, balance retraining, body mechanics, functional ambulation, postural re-education, positioning, energy conservation and use of adaptive equipment  Skilled input: verbal instruction/cues, tactile instruction/cues and posture correction  Verbal Consent: Writer verbally educated and received verbal consent for hand placement, positioning of patient, and techniques to be performed today from patient         ASSESSMENT    Patient presents near baseline which was modified independent with functional mobility and self-cares. Patient's overall level of assist is currently SBA with self-cares with use of AE and SBA with functional mobility using 2ww. Pt demonstrates understanding of using AE for LB dressing to adhere to spinal precautions. Requires SBA for functional mobility and transfers with cues to breathe out to control pain when performing sit<>stands. Requires UE support from walker for balance and due to sciatic nerve pain with reports of intermittent spasms. Given handouts for AE lumbar spinal precautions with visual demonstration on handout. Pt reports that spouse and children will be present initially for assistance with ADLs and IADLs if needed; will obtain shower chair from friend upon discharge per recommendation. Patient denies further concerns/questions  regarding resumption of daily routine on discharge. No further OT needs identified. Therefore, acute OT to sign off on this patient.      Discharge OT.       Discharge Recommendations:  Recommendation for Discharge Location: OT WI: Home without therapy needs  Recommendation for Discharge Support: OT WI: Intermittent assist daily, Assistance with IADLs  PT/OT Mobility Equipment for Discharge: 2ww  PT/OT ADL Equipment for Discharge: is obtaining shower chair from friend; recommend reacher and sock aid  Progress: goals met     • Skilled therapy is not required due to patient meeting all OT goals during today's session.     • Clinical decision making: Low - Patient has few limitations (1-3), comorbidities and/or complexities, as noted in problem focused assessment noted above, that impact their occupational profile.  Resulting in few treatment options and no task modification consistent with low clinical decision making complexity.    Education Provided:   Learning Assessment:  - Primary learner: patient  - Are they ready to learn: yes  - Preferred learning style: verbal  - Barriers to learning: no barriers apparent at this time  Education provided during session:  - Receiving Education: patient  - Results of above outlined education: Verbalizes understanding    Patient at End of Session:   Safety measures: call light within reach, alarm system in place/re-engaged and lines intact  Handoff to: nurse  Care approved by and performed under the direction of on-site therapist. Student’s note read and approved.  Ngoc Brown, OT        PLAN  Suggestions for next session as indicated: N/a    Discharge OT.      Interventions: Recommend initial assist from family for ADLs/IADLs as needed.   Agreement to plan and goals: patient agrees with goals and treatment plan         Therapy procedure time and total treatment time can be found documented on the Time Entry flowsheet   pt received intake spot 3. pt A+Ox3 x/o fever and bodyaches and cough x 4 days. pt also endorses foul smelling urine. denies dysuria. labs sent as ordered. IVSL in place. medicated as ordered. LR infusing. will continue to monitor.

## 2022-12-13 NOTE — OB RN INTRAOPERATIVE NOTE - NS_DELIVERYATTENDING2_OBGYN_ALL_OB_FT
[facility] Kidney Stone Hotline    The [facility] Hotline is a resource to assist you when experiencing the symptoms of a kidney stone. Call the [facility] hotline at [phone](e). When you call this number, a staff member will coordinate appropriate care by either scheduling you a timely appointment at our office or directing you to immediate care, as needed.
Sample/[place] (resident)

## 2023-05-04 NOTE — OB RN PREOPERATIVE CHECKLIST - NS_PREOPPTSENTTO_OBGYN_ALL_OB
[facility]iology Consultation / Initial Visit      Today's Date: 5/4/2023    PCP: Isai Pittman MD  Referring Provider: Isai Pittman MD    INDICATION FOR CONSULTATION: Syncope      HPI:       75 year old female with hypertension, GERD, hyperlipidemia, iron deficiency anemia and gout presented to the ED on 5/3/2023 with a chief complaint of lightheadness. Patient reports that for about 2 weeks she has been feeling lightheaded/dizzy. Patient believed she was going to pass out. Patient states her symptoms can occur at rest and on exertion. Patient reports that when she wakes up she feels okay, but has the day progresses, she begins to feel unwell. Denies any chest pain. Patient does reports SOB that she states has been present for years and is unchanged. Denies leg swelling, vision changes, or focal weakness. Endorses mild, intermittent headaches. Patient believed her symptoms were due to side effects of her medication for gout, so she stopped taking them. Reports symptoms still occurring after stopping her medication.     HTN with medications and HLD. Denies DM. History of smoking, quit in 1991. Started smoking at 15/16 years olds, for 20 years. History of alcohol use. Dizziness when walking with lightheadedness, even when she closes her eyes. Denies LOC, syncope or room spinning. Occasional mild chest pain. Endorses some SOB. History of block artery in leg, had surgery about 2019. Denies palpitations. Denies prior heart problems. Pt with skin lupus. Reports numbness in her fingers when it is cold. Patient reports she stopped taking her iron and gout medications. Patient reports sleeping well, sometimes snore. Patient reports dozing off when sitting. States she is seeing a doctor for sleep apnea. Denies fever, chills, n/v/d. Endorses a cough. Denies family history of heart problems. Current feeling well. States she is eating well. Reports cramps in her toes, even when sleeping.     Cardiology on consult for  syncope.      ROS:     General: No fever or chills, No loss of appetite.    RS: No hemoptysis  GI: No melena or hematochezia  : No urinary disturbance  Derm: No skin disorders   Heme: No blood dyscrasias.  Remainder of 12 point systems reviewed and negative (or as mentioned in HPI)    Relevant Past Medical History:  Past Medical History:   Diagnosis Date   • Atypical chest pain 2008   • Cataract 10/22/2014   • Degenerative joint disease (DJD) of lumbar spine    • Difficulty breathing 2012   • Diverticulosis    • Dysthymia    • Gastroesophageal reflux disease 2016   • GERD (gastroesophageal reflux disease)    • Glaucoma 2008   • HTN (hypertension)    • Hyperlipidemia    • Hypertension, essential, benign 2014   • Lumbar disc disease with radiculopathy 3/29/2017   • Myalgia    • Other chronic sinusitis 2010   • PAD (peripheral artery disease) (CMD)    • Peripheral arterial disease (CMD) 2019   • Shortness of breath 2019   • Systemic lupus erythematosus, unspecified (CMD)    • Tuberculosis     1972 as per pt.       Past Surgical History:   Procedure Laterality Date   • Coronary angiogram/possible ptca - cv     • Peripheral vascular intervention Bilateral    • Peripheral vascular intervention Left 2020        Social History:  Social History     Tobacco Use   Smoking Status Former   • Current packs/day: 0.00   • Types: Cigarettes   • Quit date:    • Years since quittin.3   Smokeless Tobacco Never   Vaping Use   Vaping Status never used     Social History     Substance and Sexual Activity   Alcohol Use No     History   Drug Use Unknown       Family History:   Family History   Problem Relation Age of Onset   • Cancer Mother         lung   • Cancer, Lung Mother    • Patient is unaware of any medical problems Father    • Cancer Sister         pancreas   • Cancer, Pancreatic Sister    • Myocardial Infarction Brother    • Cancer, Endometrial Paternal Grandmother         Inpatient Medications  Current Facility-Administered Medications   Medication Dose Route Frequency Provider Last Rate Last Admin   • gabapentin (NEURONTIN) capsule 300 mg  300 mg Oral 2 times per day Rona Peguero DO       • aspirin chewable 81 mg  81 mg Oral Daily Rona Peguero DO   81 mg at 05/04/23 0816   • allopurinol (ZYLOPRIM) tablet 100 mg  100 mg Oral Daily Rona Peguero, DO   100 mg at 05/04/23 0816   • dorzolamide (TRUSOPT) 2 % ophthalmic solution 1 drop  1 drop Both Eyes BID Rona Peguero DO   1 drop at 05/04/23 0816   • famotidine (PEPCID) tablet 20 mg  20 mg Oral Daily Rona Peguero DO   20 mg at 05/04/23 0816   • ferrous sulfate (65 mg Fe per 325 mg) tablet 325 mg  325 mg Oral Daily with breakfast Rona Peguero DO   325 mg at 05/04/23 0816   • latanoprost (XALATAN) 0.005 % ophthalmic solution 1 drop  1 drop Both Eyes QHS Rona Peguero DO   1 drop at 05/04/23 0021   • rosuvastatin (CRESTOR) tablet 10 mg  10 mg Oral Daily Rona Peguero DO   10 mg at 05/04/23 0816   • [Held by provider] amLODIPine (NORVASC) tablet 10 mg  10 mg Oral Daily Rona Peguero,    10 mg at 05/03/23 1529      Current Facility-Administered Medications   Medication Dose Route Frequency Provider Last Rate Last Admin   • lactated ringers infusion   Intravenous Continuous Balaji Jones MD 75 mL/hr at 05/04/23 0555 Rate Verify at 05/04/23 0555      Current Facility-Administered Medications   Medication Dose Route Frequency Provider Last Rate Last Admin   • acetaminophen (TYLENOL) tablet 650 mg  650 mg Oral Q6H PRN Rona Peguero DO   650 mg at 05/04/23 0027   • meclizine (ANTIVERT) tablet 25 mg  25 mg Oral TID PRN Rona Peguero DO            Allergy   ALLERGIES:  No Known Allergies         Examination:      Vital Last Value 24 Hour Range   Temperature 98.1 °F (36.7 °C) (05/04/23 0727) Temp  Min: 96.7 °F (35.9 °C)  Max: 98.4 °F (36.9 °C)   Pulse 69 (05/04/23 0830) Pulse  Min: 63  Max: 100   Respiratory 14 (05/04/23 0727)  Resp  Min: 11  Max: 20   Non-Invasive  Blood Pressure (P) 120/67 (05/04/23 0830) BP  Min: 96/47  Max: 150/50   Pulse Oximetry 99 % (05/04/23 0727) SpO2  Min: 97 %  Max: 100 %   Arterial   Blood Pressure   No data recorded     Tele: SR 60-70's      Intake/Output Summary (Last 24 hours) at 5/4/2023 0926  Last data filed at 5/4/2023 0600  Gross per 24 hour   Intake 1445.22 ml   Output 600 ml   Net 845.22 ml        Weight    05/03/23 1843 05/04/23 0500   Weight: 67 kg (147 lb 11.3 oz) 65 kg (143 lb 4.8 oz)         GENERAL: No apparent distress  HEENT: Normocephalic.  Neck:  Supple neck.   Oral mucosa : Pink and moist.    Endocrine: There is no goiter.  CVS: Regular rate and rhythm.  Normal first and second heart tones.   Lung fields: Clear to auscultation bilaterally.   GI: Soft. Nontender, nondistended.    Lower extremity: No cyanosis, clubbing or edema.   Peripheral vascular: Both lower extremities are warm and well perfused.    Neuro: Awake and alert.  Nonfocal examination.  Psych: Appropriate mood and affect  Integumentary: Warm and Dry      Clinical Data:       The following were personally visualized and interpreted by me:    LABS:    CBC  Recent Labs   Lab 05/04/23  0418 05/03/23  0935   WBC 7.6 9.3   HCT 30.9* 33.5*   HGB 10.2* 11.2*    360       CMP  Recent Labs   Lab 05/04/23  0418 05/03/23  0935   SODIUM 132* 127*   POTASSIUM 4.0 3.8   CHLORIDE 102 97   CO2 25 26   GLUCOSE 89 88   BUN 49* 52*   CREATININE 2.08* 2.25*   CALCIUM 10.1 10.4*   TOTPROTEIN 7.0 8.0   ALBUMIN 3.6 4.4   BILIRUBIN 0.7 0.9   AST <5 15   GPT 16 15   ALKPT 61 77       Cardiac Labs  Recent Labs   Lab 05/03/23  0935   HTROPI 7       Lipid Panel  No results found    Coags  No results found    ABG  No results found    IMAGING:    ECG:   Encounter Date: 05/03/23   Electrocardiogram 12-Lead   Result Value    Ventricular Rate EKG/Min (BPM) 60    Atrial Rate (BPM) 60    RI-Interval (MSEC) 154    QRS-Interval (MSEC) 70    QT-Interval (MSEC)  434    QTc 434    P Axis (Degrees) 55    R Axis (Degrees) 54    T Axis (Degrees) 60    REPORT TEXT      Normal sinus rhythm  Normal ECG  When compared with ECG of  03-MAY-2023 23:52,  Sinus rhythm  has replaced  Atrial fibrillation  Criteria for  Anterior infarct  are no longer  present          TTE 5/4/2023   SUMMARY:  1. Left ventricle: The cavity size is normal. Wall thickness is normal.     Systolic function is normal. The ejection fraction was measured by single     plane method of disks. The ejection fraction is 60%.  2. Right ventricle: The cavity size is normal. Systolic function is normal.    TTE 3/10/21  SUMMARY:   Left ventricle: The cavity size is normal. Wall thickness is  normal. The ejection fraction was measured by single plane method of  disks. The ejection fraction is 62%.    TTE 5/14/19  SUMMARY:  1. Left ventricle: The cavity size is normal. Wall thickness is normal.     Systolic function is normal. The estimated ejection fraction is 55-60%,     by visual assessment. Doppler parameters are consistent with abnormal     left ventricular relaxation (grade 1 diastolic dysfunction).    Lexiscan Cardiolite Stress Test 2/9/15  Conclusions:  1. No gross evidence of inducible ischemia at 93 % target heart rate by stress echocardiogram.  2. Baseline electrocardiogram sinus rhythm  3. Baseline echocardiogram normal left ventricular chamber size, without left ventricular     hypertrophy, with normal global systolic function  4. Normal blood pressure/ heart rate response during stress testing.  5. No evidence significant arrhythmias noted during exercise or recovery.  6. Functional Class I, total METs achieved.= 5.10       Cath Report:  Results for orders placed or performed during the hospital encounter of 12/28/20   Cath/PV Case    Narrative    · Mid L SFA to Prox L Popliteal lesion with 100% stenosis.  · Prox L SFA to Mid L SFA lesion with 100% stenosis.  · Ost L PTA to Dist L PTA lesion with 100%  stenosis.     1.  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  The previously placed stent was patent with thrombus   proximal to the stent in the native vessel there is significant fibrotic   stenosis which was treated with atherectomy and 2 new drug-eluting stent  2.  Recommend long-term treatment with Plavix and Xarelto 2.5 twice daily.    Aspirin not necessary given antiplatelet and Xarelto treatment           Assessment/Plans:     Dizziness/lightheadedness  -Patient presents for dizziness at rest and on exertion. No syncopal episode  -Troponin negative  -CXR from 5/3/23 showed no acute abnormalities  -CT head from 5/3/2023 showed no acute intracranial abnormality  -MRI brain from 5/3/2023 showed no evidence of acute intracranial hemorrhage or hydrocephalus or midline, no acute infarction, mild generalized atrophy, and mild periventricular and subcortical white matter chronic small vessel ischemic changes.  -Echo from 5/4/2023 showed LVEF of 60% with no regional wall motion abnormalities, normal RV systolic function, trivial TR    Hypertension  -BP currently stable   -Continue amlodipine 10mg daily, currently on hold  -On losartan-hctz 100-25mg daily at home    Hyperlipidemia  -Lipid panel from 8/31/22 showed Chol 152, Trig 212, HDL 49, LDL 61  -Continue rosuvastatin 10mg daily    Iron deficiency anemia  -HGB 10.2  -Will continue to monitor   -mgmt per primary    Gout   GERD  -Patient presents for dizziness at rest and on exertion, believed her symptoms were due to taking allopurinol. Patient states stopping allopurinol on her own without any resolve to her symptoms  -Continue allopurinol 100mg daily  -mgmt per primary      Thank you for allowing us to participate in this patient's care.  Please do not hesitate to call with any questions or concerns.    Scribe Attestation: Entered by Lina Raines acting as scribe for Dr. Kamala Dean MD  The documentation recorded  by the scribe accurately and completely reflects the service(s) I personally performed and the decisions made by me.      operating room operating room/3

## 2023-08-07 NOTE — OB PROVIDER DELIVERY SUMMARY - NSCOUNTCORRECT_OBGYN_ALL_OB
Tissue Cultured Epidermal Autograft Text: The defect edges were debeveled with a #15 scalpel blade.  Given the location of the defect, shape of the defect and the proximity to free margins a tissue cultured epidermal autograft was deemed most appropriate.  The graft was then trimmed to fit the size of the defect.  The graft was then placed in the primary defect and oriented appropriately. Laps, needles and instruments count was reported as correct.

## 2023-12-12 ENCOUNTER — INPATIENT (INPATIENT)
Facility: HOSPITAL | Age: 33
LOS: 3 days | Discharge: HOME | End: 2023-12-16
Attending: HOSPITALIST | Admitting: HOSPITALIST
Payer: COMMERCIAL

## 2023-12-12 VITALS
SYSTOLIC BLOOD PRESSURE: 94 MMHG | TEMPERATURE: 100 F | DIASTOLIC BLOOD PRESSURE: 59 MMHG | RESPIRATION RATE: 18 BRPM | HEART RATE: 134 BPM | OXYGEN SATURATION: 99 %

## 2023-12-12 DIAGNOSIS — Z98.891 HISTORY OF UTERINE SCAR FROM PREVIOUS SURGERY: Chronic | ICD-10-CM

## 2023-12-12 DIAGNOSIS — N12 TUBULO-INTERSTITIAL NEPHRITIS, NOT SPECIFIED AS ACUTE OR CHRONIC: ICD-10-CM

## 2023-12-12 DIAGNOSIS — Z29.9 ENCOUNTER FOR PROPHYLACTIC MEASURES, UNSPECIFIED: ICD-10-CM

## 2023-12-12 LAB
ALBUMIN SERPL ELPH-MCNC: 4.2 G/DL — SIGNIFICANT CHANGE UP (ref 3.3–5)
ALBUMIN SERPL ELPH-MCNC: 4.2 G/DL — SIGNIFICANT CHANGE UP (ref 3.3–5)
ALP SERPL-CCNC: 67 U/L — SIGNIFICANT CHANGE UP (ref 40–120)
ALP SERPL-CCNC: 67 U/L — SIGNIFICANT CHANGE UP (ref 40–120)
ALT FLD-CCNC: 26 U/L — SIGNIFICANT CHANGE UP (ref 4–33)
ALT FLD-CCNC: 26 U/L — SIGNIFICANT CHANGE UP (ref 4–33)
ANION GAP SERPL CALC-SCNC: 13 MMOL/L — SIGNIFICANT CHANGE UP (ref 7–14)
ANION GAP SERPL CALC-SCNC: 13 MMOL/L — SIGNIFICANT CHANGE UP (ref 7–14)
APPEARANCE UR: ABNORMAL
APPEARANCE UR: ABNORMAL
APTT BLD: 31.5 SEC — SIGNIFICANT CHANGE UP (ref 24.5–35.6)
APTT BLD: 31.5 SEC — SIGNIFICANT CHANGE UP (ref 24.5–35.6)
AST SERPL-CCNC: 25 U/L — SIGNIFICANT CHANGE UP (ref 4–32)
AST SERPL-CCNC: 25 U/L — SIGNIFICANT CHANGE UP (ref 4–32)
BACTERIA # UR AUTO: ABNORMAL /HPF
BACTERIA # UR AUTO: ABNORMAL /HPF
BASE EXCESS BLDV CALC-SCNC: -1.1 MMOL/L — SIGNIFICANT CHANGE UP (ref -2–3)
BASE EXCESS BLDV CALC-SCNC: -1.1 MMOL/L — SIGNIFICANT CHANGE UP (ref -2–3)
BASOPHILS # BLD AUTO: 0.02 K/UL — SIGNIFICANT CHANGE UP (ref 0–0.2)
BASOPHILS # BLD AUTO: 0.02 K/UL — SIGNIFICANT CHANGE UP (ref 0–0.2)
BASOPHILS NFR BLD AUTO: 0.2 % — SIGNIFICANT CHANGE UP (ref 0–2)
BASOPHILS NFR BLD AUTO: 0.2 % — SIGNIFICANT CHANGE UP (ref 0–2)
BILIRUB SERPL-MCNC: 0.5 MG/DL — SIGNIFICANT CHANGE UP (ref 0.2–1.2)
BILIRUB SERPL-MCNC: 0.5 MG/DL — SIGNIFICANT CHANGE UP (ref 0.2–1.2)
BILIRUB UR-MCNC: NEGATIVE — SIGNIFICANT CHANGE UP
BILIRUB UR-MCNC: NEGATIVE — SIGNIFICANT CHANGE UP
BLOOD GAS VENOUS COMPREHENSIVE RESULT: SIGNIFICANT CHANGE UP
BLOOD GAS VENOUS COMPREHENSIVE RESULT: SIGNIFICANT CHANGE UP
BUN SERPL-MCNC: 8 MG/DL — SIGNIFICANT CHANGE UP (ref 7–23)
BUN SERPL-MCNC: 8 MG/DL — SIGNIFICANT CHANGE UP (ref 7–23)
CALCIUM SERPL-MCNC: 9 MG/DL — SIGNIFICANT CHANGE UP (ref 8.4–10.5)
CALCIUM SERPL-MCNC: 9 MG/DL — SIGNIFICANT CHANGE UP (ref 8.4–10.5)
CAST: 4 /LPF — SIGNIFICANT CHANGE UP (ref 0–4)
CAST: 4 /LPF — SIGNIFICANT CHANGE UP (ref 0–4)
CHLORIDE BLDV-SCNC: 101 MMOL/L — SIGNIFICANT CHANGE UP (ref 96–108)
CHLORIDE BLDV-SCNC: 101 MMOL/L — SIGNIFICANT CHANGE UP (ref 96–108)
CHLORIDE SERPL-SCNC: 102 MMOL/L — SIGNIFICANT CHANGE UP (ref 98–107)
CHLORIDE SERPL-SCNC: 102 MMOL/L — SIGNIFICANT CHANGE UP (ref 98–107)
CO2 BLDV-SCNC: 26.2 MMOL/L — HIGH (ref 22–26)
CO2 BLDV-SCNC: 26.2 MMOL/L — HIGH (ref 22–26)
CO2 SERPL-SCNC: 21 MMOL/L — LOW (ref 22–31)
CO2 SERPL-SCNC: 21 MMOL/L — LOW (ref 22–31)
COLOR SPEC: YELLOW — SIGNIFICANT CHANGE UP
COLOR SPEC: YELLOW — SIGNIFICANT CHANGE UP
CREAT SERPL-MCNC: 0.75 MG/DL — SIGNIFICANT CHANGE UP (ref 0.5–1.3)
CREAT SERPL-MCNC: 0.75 MG/DL — SIGNIFICANT CHANGE UP (ref 0.5–1.3)
DIFF PNL FLD: ABNORMAL
DIFF PNL FLD: ABNORMAL
EGFR: 107 ML/MIN/1.73M2 — SIGNIFICANT CHANGE UP
EGFR: 107 ML/MIN/1.73M2 — SIGNIFICANT CHANGE UP
EOSINOPHIL # BLD AUTO: 0 K/UL — SIGNIFICANT CHANGE UP (ref 0–0.5)
EOSINOPHIL # BLD AUTO: 0 K/UL — SIGNIFICANT CHANGE UP (ref 0–0.5)
EOSINOPHIL NFR BLD AUTO: 0 % — SIGNIFICANT CHANGE UP (ref 0–6)
EOSINOPHIL NFR BLD AUTO: 0 % — SIGNIFICANT CHANGE UP (ref 0–6)
GAS PNL BLDV: 134 MMOL/L — LOW (ref 136–145)
GAS PNL BLDV: 134 MMOL/L — LOW (ref 136–145)
GLUCOSE BLDV-MCNC: 168 MG/DL — HIGH (ref 70–99)
GLUCOSE BLDV-MCNC: 168 MG/DL — HIGH (ref 70–99)
GLUCOSE SERPL-MCNC: 161 MG/DL — HIGH (ref 70–99)
GLUCOSE SERPL-MCNC: 161 MG/DL — HIGH (ref 70–99)
GLUCOSE UR QL: 500 MG/DL
GLUCOSE UR QL: 500 MG/DL
HCG SERPL-ACNC: <1 MIU/ML — SIGNIFICANT CHANGE UP
HCG SERPL-ACNC: <1 MIU/ML — SIGNIFICANT CHANGE UP
HCO3 BLDV-SCNC: 25 MMOL/L — SIGNIFICANT CHANGE UP (ref 22–29)
HCO3 BLDV-SCNC: 25 MMOL/L — SIGNIFICANT CHANGE UP (ref 22–29)
HCT VFR BLD CALC: 40 % — SIGNIFICANT CHANGE UP (ref 34.5–45)
HCT VFR BLD CALC: 40 % — SIGNIFICANT CHANGE UP (ref 34.5–45)
HCT VFR BLDA CALC: 41 % — SIGNIFICANT CHANGE UP (ref 34.5–46.5)
HCT VFR BLDA CALC: 41 % — SIGNIFICANT CHANGE UP (ref 34.5–46.5)
HGB BLD CALC-MCNC: 13.6 G/DL — SIGNIFICANT CHANGE UP (ref 11.7–16.1)
HGB BLD CALC-MCNC: 13.6 G/DL — SIGNIFICANT CHANGE UP (ref 11.7–16.1)
HGB BLD-MCNC: 12.9 G/DL — SIGNIFICANT CHANGE UP (ref 11.5–15.5)
HGB BLD-MCNC: 12.9 G/DL — SIGNIFICANT CHANGE UP (ref 11.5–15.5)
IANC: 8.12 K/UL — HIGH (ref 1.8–7.4)
IANC: 8.12 K/UL — HIGH (ref 1.8–7.4)
IMM GRANULOCYTES NFR BLD AUTO: 1 % — HIGH (ref 0–0.9)
IMM GRANULOCYTES NFR BLD AUTO: 1 % — HIGH (ref 0–0.9)
INR BLD: 1.5 RATIO — HIGH (ref 0.85–1.18)
INR BLD: 1.5 RATIO — HIGH (ref 0.85–1.18)
KETONES UR-MCNC: 15 MG/DL
KETONES UR-MCNC: 15 MG/DL
LACTATE BLDV-MCNC: 1.5 MMOL/L — SIGNIFICANT CHANGE UP (ref 0.5–2)
LACTATE BLDV-MCNC: 1.5 MMOL/L — SIGNIFICANT CHANGE UP (ref 0.5–2)
LEUKOCYTE ESTERASE UR-ACNC: ABNORMAL
LEUKOCYTE ESTERASE UR-ACNC: ABNORMAL
LYMPHOCYTES # BLD AUTO: 0.69 K/UL — LOW (ref 1–3.3)
LYMPHOCYTES # BLD AUTO: 0.69 K/UL — LOW (ref 1–3.3)
LYMPHOCYTES # BLD AUTO: 7.2 % — LOW (ref 13–44)
LYMPHOCYTES # BLD AUTO: 7.2 % — LOW (ref 13–44)
MCHC RBC-ENTMCNC: 28.4 PG — SIGNIFICANT CHANGE UP (ref 27–34)
MCHC RBC-ENTMCNC: 28.4 PG — SIGNIFICANT CHANGE UP (ref 27–34)
MCHC RBC-ENTMCNC: 32.3 GM/DL — SIGNIFICANT CHANGE UP (ref 32–36)
MCHC RBC-ENTMCNC: 32.3 GM/DL — SIGNIFICANT CHANGE UP (ref 32–36)
MCV RBC AUTO: 88.1 FL — SIGNIFICANT CHANGE UP (ref 80–100)
MCV RBC AUTO: 88.1 FL — SIGNIFICANT CHANGE UP (ref 80–100)
MONOCYTES # BLD AUTO: 0.66 K/UL — SIGNIFICANT CHANGE UP (ref 0–0.9)
MONOCYTES # BLD AUTO: 0.66 K/UL — SIGNIFICANT CHANGE UP (ref 0–0.9)
MONOCYTES NFR BLD AUTO: 6.9 % — SIGNIFICANT CHANGE UP (ref 2–14)
MONOCYTES NFR BLD AUTO: 6.9 % — SIGNIFICANT CHANGE UP (ref 2–14)
NEUTROPHILS # BLD AUTO: 8.12 K/UL — HIGH (ref 1.8–7.4)
NEUTROPHILS # BLD AUTO: 8.12 K/UL — HIGH (ref 1.8–7.4)
NEUTROPHILS NFR BLD AUTO: 84.7 % — HIGH (ref 43–77)
NEUTROPHILS NFR BLD AUTO: 84.7 % — HIGH (ref 43–77)
NITRITE UR-MCNC: POSITIVE
NITRITE UR-MCNC: POSITIVE
NRBC # BLD: 0 /100 WBCS — SIGNIFICANT CHANGE UP (ref 0–0)
NRBC # BLD: 0 /100 WBCS — SIGNIFICANT CHANGE UP (ref 0–0)
NRBC # FLD: 0 K/UL — SIGNIFICANT CHANGE UP (ref 0–0)
NRBC # FLD: 0 K/UL — SIGNIFICANT CHANGE UP (ref 0–0)
PCO2 BLDV: 45 MMHG — SIGNIFICANT CHANGE UP (ref 39–52)
PCO2 BLDV: 45 MMHG — SIGNIFICANT CHANGE UP (ref 39–52)
PH BLDV: 7.35 — SIGNIFICANT CHANGE UP (ref 7.32–7.43)
PH BLDV: 7.35 — SIGNIFICANT CHANGE UP (ref 7.32–7.43)
PH UR: 6 — SIGNIFICANT CHANGE UP (ref 5–8)
PH UR: 6 — SIGNIFICANT CHANGE UP (ref 5–8)
PLATELET # BLD AUTO: 205 K/UL — SIGNIFICANT CHANGE UP (ref 150–400)
PLATELET # BLD AUTO: 205 K/UL — SIGNIFICANT CHANGE UP (ref 150–400)
PO2 BLDV: 37 MMHG — SIGNIFICANT CHANGE UP (ref 25–45)
PO2 BLDV: 37 MMHG — SIGNIFICANT CHANGE UP (ref 25–45)
POTASSIUM BLDV-SCNC: 3.3 MMOL/L — LOW (ref 3.5–5.1)
POTASSIUM BLDV-SCNC: 3.3 MMOL/L — LOW (ref 3.5–5.1)
POTASSIUM SERPL-MCNC: 3.3 MMOL/L — LOW (ref 3.5–5.3)
POTASSIUM SERPL-MCNC: 3.3 MMOL/L — LOW (ref 3.5–5.3)
POTASSIUM SERPL-SCNC: 3.3 MMOL/L — LOW (ref 3.5–5.3)
POTASSIUM SERPL-SCNC: 3.3 MMOL/L — LOW (ref 3.5–5.3)
PROT SERPL-MCNC: 7.5 G/DL — SIGNIFICANT CHANGE UP (ref 6–8.3)
PROT SERPL-MCNC: 7.5 G/DL — SIGNIFICANT CHANGE UP (ref 6–8.3)
PROT UR-MCNC: 30 MG/DL
PROT UR-MCNC: 30 MG/DL
PROTHROM AB SERPL-ACNC: 16.7 SEC — HIGH (ref 9.5–13)
PROTHROM AB SERPL-ACNC: 16.7 SEC — HIGH (ref 9.5–13)
RBC # BLD: 4.54 M/UL — SIGNIFICANT CHANGE UP (ref 3.8–5.2)
RBC # BLD: 4.54 M/UL — SIGNIFICANT CHANGE UP (ref 3.8–5.2)
RBC # FLD: 13.4 % — SIGNIFICANT CHANGE UP (ref 10.3–14.5)
RBC # FLD: 13.4 % — SIGNIFICANT CHANGE UP (ref 10.3–14.5)
RBC CASTS # UR COMP ASSIST: 2 /HPF — SIGNIFICANT CHANGE UP (ref 0–4)
RBC CASTS # UR COMP ASSIST: 2 /HPF — SIGNIFICANT CHANGE UP (ref 0–4)
REVIEW: SIGNIFICANT CHANGE UP
REVIEW: SIGNIFICANT CHANGE UP
SAO2 % BLDV: 48.7 % — LOW (ref 67–88)
SAO2 % BLDV: 48.7 % — LOW (ref 67–88)
SODIUM SERPL-SCNC: 136 MMOL/L — SIGNIFICANT CHANGE UP (ref 135–145)
SODIUM SERPL-SCNC: 136 MMOL/L — SIGNIFICANT CHANGE UP (ref 135–145)
SP GR SPEC: 1.01 — SIGNIFICANT CHANGE UP (ref 1–1.03)
SP GR SPEC: 1.01 — SIGNIFICANT CHANGE UP (ref 1–1.03)
SQUAMOUS # UR AUTO: 1 /HPF — SIGNIFICANT CHANGE UP (ref 0–5)
SQUAMOUS # UR AUTO: 1 /HPF — SIGNIFICANT CHANGE UP (ref 0–5)
UROBILINOGEN FLD QL: 0.2 MG/DL — SIGNIFICANT CHANGE UP (ref 0.2–1)
UROBILINOGEN FLD QL: 0.2 MG/DL — SIGNIFICANT CHANGE UP (ref 0.2–1)
WBC # BLD: 9.59 K/UL — SIGNIFICANT CHANGE UP (ref 3.8–10.5)
WBC # BLD: 9.59 K/UL — SIGNIFICANT CHANGE UP (ref 3.8–10.5)
WBC # FLD AUTO: 9.59 K/UL — SIGNIFICANT CHANGE UP (ref 3.8–10.5)
WBC # FLD AUTO: 9.59 K/UL — SIGNIFICANT CHANGE UP (ref 3.8–10.5)
WBC UR QL: 286 /HPF — HIGH (ref 0–5)
WBC UR QL: 286 /HPF — HIGH (ref 0–5)

## 2023-12-12 PROCEDURE — 99222 1ST HOSP IP/OBS MODERATE 55: CPT

## 2023-12-12 PROCEDURE — 71046 X-RAY EXAM CHEST 2 VIEWS: CPT | Mod: 26

## 2023-12-12 PROCEDURE — 99285 EMERGENCY DEPT VISIT HI MDM: CPT

## 2023-12-12 PROCEDURE — 74177 CT ABD & PELVIS W/CONTRAST: CPT | Mod: 26,MA

## 2023-12-12 RX ORDER — SODIUM CHLORIDE 9 MG/ML
1000 INJECTION INTRAMUSCULAR; INTRAVENOUS; SUBCUTANEOUS ONCE
Refills: 0 | Status: COMPLETED | OUTPATIENT
Start: 2023-12-12 | End: 2023-12-12

## 2023-12-12 RX ORDER — SIMETHICONE 80 MG/1
1 TABLET, CHEWABLE ORAL
Qty: 0 | Refills: 0 | DISCHARGE

## 2023-12-12 RX ORDER — ERTAPENEM SODIUM 1 G/1
1000 INJECTION, POWDER, LYOPHILIZED, FOR SOLUTION INTRAMUSCULAR; INTRAVENOUS ONCE
Refills: 0 | Status: COMPLETED | OUTPATIENT
Start: 2023-12-12 | End: 2023-12-12

## 2023-12-12 RX ORDER — ACETAMINOPHEN 500 MG
975 TABLET ORAL ONCE
Refills: 0 | Status: COMPLETED | OUTPATIENT
Start: 2023-12-12 | End: 2023-12-12

## 2023-12-12 RX ORDER — ACETAMINOPHEN 500 MG
1000 TABLET ORAL ONCE
Refills: 0 | Status: COMPLETED | OUTPATIENT
Start: 2023-12-12 | End: 2023-12-12

## 2023-12-12 RX ORDER — KETOROLAC TROMETHAMINE 30 MG/ML
15 SYRINGE (ML) INJECTION ONCE
Refills: 0 | Status: DISCONTINUED | OUTPATIENT
Start: 2023-12-12 | End: 2023-12-12

## 2023-12-12 RX ORDER — ACETAMINOPHEN 500 MG
650 TABLET ORAL EVERY 6 HOURS
Refills: 0 | Status: DISCONTINUED | OUTPATIENT
Start: 2023-12-12 | End: 2023-12-16

## 2023-12-12 RX ORDER — ACETAMINOPHEN 500 MG
1 TABLET ORAL
Qty: 0 | Refills: 0 | DISCHARGE

## 2023-12-12 RX ORDER — ONDANSETRON 8 MG/1
4 TABLET, FILM COATED ORAL EVERY 8 HOURS
Refills: 0 | Status: DISCONTINUED | OUTPATIENT
Start: 2023-12-12 | End: 2023-12-16

## 2023-12-12 RX ORDER — IBUPROFEN 200 MG
1 TABLET ORAL
Qty: 0 | Refills: 0 | DISCHARGE

## 2023-12-12 RX ORDER — LANOLIN ALCOHOL/MO/W.PET/CERES
3 CREAM (GRAM) TOPICAL AT BEDTIME
Refills: 0 | Status: DISCONTINUED | OUTPATIENT
Start: 2023-12-12 | End: 2023-12-16

## 2023-12-12 RX ORDER — ERTAPENEM SODIUM 1 G/1
1000 INJECTION, POWDER, LYOPHILIZED, FOR SOLUTION INTRAMUSCULAR; INTRAVENOUS EVERY 24 HOURS
Refills: 0 | Status: DISCONTINUED | OUTPATIENT
Start: 2023-12-13 | End: 2023-12-16

## 2023-12-12 RX ADMIN — Medication 975 MILLIGRAM(S): at 10:34

## 2023-12-12 RX ADMIN — Medication 400 MILLIGRAM(S): at 21:36

## 2023-12-12 RX ADMIN — ERTAPENEM SODIUM 120 MILLIGRAM(S): 1 INJECTION, POWDER, LYOPHILIZED, FOR SOLUTION INTRAMUSCULAR; INTRAVENOUS at 10:33

## 2023-12-12 RX ADMIN — SODIUM CHLORIDE 1000 MILLILITER(S): 9 INJECTION INTRAMUSCULAR; INTRAVENOUS; SUBCUTANEOUS at 10:34

## 2023-12-12 RX ADMIN — Medication 15 MILLIGRAM(S): at 11:23

## 2023-12-12 NOTE — H&P ADULT - NSHPPOAPULMEMBOLUS_GEN_A_CORE
[facility]  Cardiology  Consult Note    Patient Name: Natalie Parnell  MRN: [id number]  Admission Date: 7/15/2018  Hospital Length of Stay: 0 days  Code Status: Full Code   Attending Provider: Ezequiel Escobar MD   Consulting Provider: BIANKA Booker ANP  Primary Care Physician: Octavio Ferrell MD  Principal Problem:Acute diastolic congestive heart failure      Inpatient consult to Cardiology  Consult performed by: PILAR HURT  Consult ordered by: LALY TAMAYO           See full consult noted dated 7/15/2018 at 1029 by Dr. Sharon Welsh      no

## 2023-12-12 NOTE — ED PROVIDER NOTE - PROGRESS NOTE DETAILS
Faheem Wei, PGY3 Patient with cultures in the past sensitive to ertapenem resistant to ceftriaxone.  Patient is urinating spontaneously here in the hospital.  Noted that she had to urinate prior to getting her CT T.

## 2023-12-12 NOTE — ED ADULT NURSE NOTE - OBJECTIVE STATEMENT
pt is a 33y old female received awake and responsive, c/o of fever,  lower abd /flank pain for past few days and dysuria as well, labs drawn and sent as per MD order, iv fluids and antibiotics in progress as per MD order

## 2023-12-12 NOTE — H&P ADULT - ASSESSMENT
33 F w/ no PMH presenting with a two-day history of L flank pain and fevers to 103 with +UA and CT suggestive of L pyelonephritis.

## 2023-12-12 NOTE — H&P ADULT - ATTENDING COMMENTS
Brianna Gr MD  Medicine Attending  Teams preferred/Pager: [phone]    I have personally seen and examined patient. I discussed the case with Dr. Chambers and agree with findings and plan as detailed per note above. 34 y/o F with hx of UTI (ESBL) before in the past admitted for left pyelonephritis. Pt reports she began having fevers and chills since sunday as well as dysuria and urinary freq. then yesterday pt began experiencing left flank pain. no n/v. no other concerns    Exam  VSS  Gen: NAD  HEENT: NC/AT  CVS S1 S2, mild tachycardia no m/r/g  lungs CTA b/l  abd: +BS soft NT/ND  Ext: no edema b/l    #left pyleloneprhitis  -c/w ertapenum  -f/u urine cultures    #fen/GI  -po as tolerated  -fluid resuscitate as needed Brianan Gr MD  Medicine Attending  Teams preferred/Pager: [phone]    I have personally seen and examined patient. I discussed the case with Dr. Chambres and agree with findings and plan as detailed per note above. 32 y/o F with hx of UTI (ESBL) before in the past admitted for left pyelonephritis. Pt reports she began having fevers and chills since sunday as well as dysuria and urinary freq. then yesterday pt began experiencing left flank pain. no n/v. no other concerns    Exam  VSS  Gen: NAD  HEENT: NC/AT  CVS S1 S2, mild tachycardia no m/r/g  lungs CTA b/l  abd: +BS soft NT/ND  Ext: no edema b/l    #left pyleloneprhitis  -c/w ertapenum  -f/u urine cultures    #fen/GI  -po as tolerated  -fluid resuscitate as needed

## 2023-12-12 NOTE — ED ADULT NURSE REASSESSMENT NOTE - NS ED NURSE REASSESS COMMENT FT1
Care assumed. Awake and alert. Reports that she is feeling better. Ambulated to BR, gait steady
Report to VICENTA Aquino
No changes. Awaiting inpt bed assignment. Dozing when undisturbed.

## 2023-12-12 NOTE — H&P ADULT - NSHPLABSRESULTS_GEN_ALL_CORE
.  LABS:                         12.9   9.59  )-----------( 205      ( 12 Dec 2023 10:30 )             40.0         136  |  102  |  8   ----------------------------<  161<H>  3.3<L>   |  21<L>  |  0.75    Ca    9.0      12 Dec 2023 10:30    TPro  7.5  /  Alb  4.2  /  TBili  0.5  /  DBili  x   /  AST  25  /  ALT  26  /  AlkPhos  67  12    PT/INR - ( 12 Dec 2023 10:30 )   PT: 16.7 sec;   INR: 1.50 ratio         PTT - ( 12 Dec 2023 10:30 )  PTT:31.5 sec  Urinalysis Basic - ( 12 Dec 2023 10:33 )    Color: Yellow / Appearance: Cloudy / S.015 / pH: x  Gluc: x / Ketone: 15 mg/dL  / Bili: Negative / Urobili: 0.2 mg/dL   Blood: x / Protein: 30 mg/dL / Nitrite: Positive   Leuk Esterase: Large / RBC: 2 /HPF /  /HPF   Sq Epi: x / Non Sq Epi: 1 /HPF / Bacteria: Many /HPF            RADIOLOGY, EKG & ADDITIONAL TESTS: Reviewed.

## 2023-12-12 NOTE — ED PROVIDER NOTE - CLINICAL SUMMARY MEDICAL DECISION MAKING FREE TEXT BOX
33-year-old female no significant medical history presenting for fevers and flank pain. The patient is also having hematuria and dysuria and malodorous urine. The patient is otherwise having no chest pain, shortness of breath, headache, visual changes, nausea, vomiting, numbness or weakness. VS. tachycardic and febrile. PE. +left flank tenderness and suprapubic tenderness to palpation.     The differential is not limited to pyelonephritis, UTI, nephrolithiasis, renal abscess, will obtain basic labs, blood cultures, CT abdomen and pelvis, urine cultures.  Dispo pending labs imaging and reassessment.

## 2023-12-12 NOTE — H&P ADULT - NSHPPHYSICALEXAM_GEN_ALL_CORE
T(C): 37 (12-12-23 @ 16:46), Max: 37.8 (12-12-23 @ 09:37)  HR: 118 (12-12-23 @ 16:46) (104 - 134)  BP: 102/60 (12-12-23 @ 16:46) (94/59 - 102/60)  RR: 16 (12-12-23 @ 16:46) (16 - 18)  SpO2: 100% (12-12-23 @ 16:46) (97% - 100%)    General: NAD, laying in bed  HEENT: PERRLA, EOMI, sclera non-icteric  Respiratory: Clear to ascultation bilaterally, no crackles/rales, no accessory muscle use  Cardiovascular:  RRR, no murmurs/rubs/gallops  Abdomen: Soft, NT, ND; +L CVA tenderness   Extremities: No LE edema   Skin: No rashes or lesions   Neurological: Sensation grossly intact, strength 5/5 in all extremities  Psychiatry: AOx3, appropriate insight/judgement, appropriate affect, recent/remote memory intact

## 2023-12-12 NOTE — H&P ADULT - PROBLEM SELECTOR PLAN 1
CT suggestive of L pyelonephritis and positive UA  CVA tenderness, fever, dysuria  Initially tachycardic when presented to ED  Of note, history of ESBL E. coli on urine culture    Plan:  -Continue ertapenem  -F/u urine culture  -Tylenol PRN fever  -CTM vitals, WBCs

## 2023-12-12 NOTE — H&P ADULT - HISTORY OF PRESENT ILLNESS
33 F w/ no PMH presenting with a two-day history of L flank pain and fevers to 103. She also endorses burning with urination and foul-smelling urine and states that it feels like UTIs she has had in the past. Of note, the patient has had urine cultures in the past growing ESBL E. coli but she has never been hospitalized for a UTI or pyelonephritis. She also has nausea today but no vomiting. She denies any headache, dizziness, abdominal pain, diarrhea, or rashes.  33 F w/ no PMH presenting with a two-day history of L flank pain and fevers to 103. She also endorses burning with urination and foul-smelling urine and states that it feels like UTIs she has had in the past. Of note, the patient has had urine cultures in the past growing ESBL E. coli. She also has nausea today but no vomiting. She denies any headache, dizziness, abdominal pain, diarrhea, or rashes.

## 2023-12-12 NOTE — ED PROVIDER NOTE - ATTENDING CONTRIBUTION TO CARE
34 yo F who denies any past medical history who presents to the ED c/o 4 days of fever, chills, and dysuria. Reports fever of 103 F at home, has been taking tylenol for the symptoms. Reports a h/o UTI's in the past, and reports that this feels similar. Over the past day she has also developed R flank pain. On exam, pt has suprapubic and R CVA tenderness. She was febrile and tachycardic on arrival- most likely secondary to UTI/possible pyelo. Plan for septic work-up including IV fluids, ertanepem (resistant to other abx per chart review), re-assess.

## 2023-12-12 NOTE — ED ADULT NURSE NOTE - NSFALLUNIVINTERV_ED_ALL_ED
Bed/Stretcher in lowest position, wheels locked, appropriate side rails in place/Call bell, personal items and telephone in reach/Instruct patient to call for assistance before getting out of bed/chair/stretcher/Non-slip footwear applied when patient is off stretcher/[place] to call system/Physically safe environment - no spills, clutter or unnecessary equipment/Purposeful proactive rounding/Room/bathroom lighting operational, light cord in reach Bed/Stretcher in lowest position, wheels locked, appropriate side rails in place/Call bell, personal items and telephone in reach/Instruct patient to call for assistance before getting out of bed/chair/stretcher/Non-slip footwear applied when patient is off stretcher/[place] to call system/Physically safe environment - no spills, clutter or unnecessary equipment/Purposeful proactive rounding/Room/bathroom lighting operational, light cord in reach

## 2023-12-12 NOTE — H&P ADULT - NSHPREVIEWOFSYSTEMS_GEN_ALL_CORE
REVIEW OF SYSTEMS:  CONSTITUTIONAL: No weakness; +fever and chills  EYES/ENT: No rhinorrhea or sore throat  NECK: No pain or stiffness  RESPIRATORY: No cough, wheezing, or shortness of breath  CARDIOVASCULAR: No chest pain or palpitations  GASTROINTESTINAL: No abdominal or epigastric pain; No vomiting; No diarrhea or constipation; +nausea  GENITOURINARY: +dysuria, hematuria, L flank pain  NEUROLOGICAL: No numbness or weakness  SKIN: No itching or rashes  MUSCULOSKELETAL: No joint pain or muscle pain

## 2023-12-12 NOTE — ED PROVIDER NOTE - PHYSICAL EXAMINATION
GENERAL: Awake, alert, NAD  HEENT: NC/AT, moist mucous membranes, PERRL, EOMI  LUNGS: CTAB, no wheezes or crackles   CARDIAC: RRR, no m/r/g  ABDOMEN: Soft, +suprapubic TTP, +left flank pain, non distended, no rebound, no guarding  BACK: No midline spinal tenderness, +left CVA tenderness  EXT: No edema, no calf tenderness, 2+ DP pulses bilaterally, no deformities.  NEURO: A&Ox3. Moving all extremities.  SKIN: Warm and dry. No rash.  PSYCH: Normal affect.

## 2023-12-12 NOTE — ED PROVIDER NOTE - OBJECTIVE STATEMENT
33-year-old female no significant medical history presenting for fevers and flank pain.  The patient reports that her fevers began on Sunday.  The patient has been taking Tylenol for her symptoms.  The patient is also having hematuria and dysuria and malodorous urine.  The patient reports that this last happened to her 2 years ago when she was diagnosed with a UTI.  The patient reports that this does feel like the UTIs that she has had in the past.  The patient is also endorsing reddish urine.  The patient reports she in a monogamous relationship with her .   The patient is otherwise having no chest pain, shortness of breath, headache, visual changes, nausea, vomiting, numbness or weakness.

## 2023-12-13 LAB
ALBUMIN SERPL ELPH-MCNC: 3.4 G/DL — SIGNIFICANT CHANGE UP (ref 3.3–5)
ALBUMIN SERPL ELPH-MCNC: 3.4 G/DL — SIGNIFICANT CHANGE UP (ref 3.3–5)
ALP SERPL-CCNC: 59 U/L — SIGNIFICANT CHANGE UP (ref 40–120)
ALP SERPL-CCNC: 59 U/L — SIGNIFICANT CHANGE UP (ref 40–120)
ALT FLD-CCNC: 27 U/L — SIGNIFICANT CHANGE UP (ref 4–33)
ALT FLD-CCNC: 27 U/L — SIGNIFICANT CHANGE UP (ref 4–33)
ANION GAP SERPL CALC-SCNC: 10 MMOL/L — SIGNIFICANT CHANGE UP (ref 7–14)
ANION GAP SERPL CALC-SCNC: 10 MMOL/L — SIGNIFICANT CHANGE UP (ref 7–14)
AST SERPL-CCNC: 25 U/L — SIGNIFICANT CHANGE UP (ref 4–32)
AST SERPL-CCNC: 25 U/L — SIGNIFICANT CHANGE UP (ref 4–32)
BASOPHILS # BLD AUTO: 0.02 K/UL — SIGNIFICANT CHANGE UP (ref 0–0.2)
BASOPHILS # BLD AUTO: 0.02 K/UL — SIGNIFICANT CHANGE UP (ref 0–0.2)
BASOPHILS NFR BLD AUTO: 0.2 % — SIGNIFICANT CHANGE UP (ref 0–2)
BASOPHILS NFR BLD AUTO: 0.2 % — SIGNIFICANT CHANGE UP (ref 0–2)
BILIRUB SERPL-MCNC: 0.4 MG/DL — SIGNIFICANT CHANGE UP (ref 0.2–1.2)
BILIRUB SERPL-MCNC: 0.4 MG/DL — SIGNIFICANT CHANGE UP (ref 0.2–1.2)
BUN SERPL-MCNC: 5 MG/DL — LOW (ref 7–23)
BUN SERPL-MCNC: 5 MG/DL — LOW (ref 7–23)
CALCIUM SERPL-MCNC: 7.5 MG/DL — LOW (ref 8.4–10.5)
CALCIUM SERPL-MCNC: 7.5 MG/DL — LOW (ref 8.4–10.5)
CHLORIDE SERPL-SCNC: 107 MMOL/L — SIGNIFICANT CHANGE UP (ref 98–107)
CHLORIDE SERPL-SCNC: 107 MMOL/L — SIGNIFICANT CHANGE UP (ref 98–107)
CO2 SERPL-SCNC: 19 MMOL/L — LOW (ref 22–31)
CO2 SERPL-SCNC: 19 MMOL/L — LOW (ref 22–31)
CREAT SERPL-MCNC: 0.56 MG/DL — SIGNIFICANT CHANGE UP (ref 0.5–1.3)
CREAT SERPL-MCNC: 0.56 MG/DL — SIGNIFICANT CHANGE UP (ref 0.5–1.3)
EGFR: 123 ML/MIN/1.73M2 — SIGNIFICANT CHANGE UP
EGFR: 123 ML/MIN/1.73M2 — SIGNIFICANT CHANGE UP
EOSINOPHIL # BLD AUTO: 0.01 K/UL — SIGNIFICANT CHANGE UP (ref 0–0.5)
EOSINOPHIL # BLD AUTO: 0.01 K/UL — SIGNIFICANT CHANGE UP (ref 0–0.5)
EOSINOPHIL NFR BLD AUTO: 0.1 % — SIGNIFICANT CHANGE UP (ref 0–6)
EOSINOPHIL NFR BLD AUTO: 0.1 % — SIGNIFICANT CHANGE UP (ref 0–6)
GLUCOSE SERPL-MCNC: 94 MG/DL — SIGNIFICANT CHANGE UP (ref 70–99)
GLUCOSE SERPL-MCNC: 94 MG/DL — SIGNIFICANT CHANGE UP (ref 70–99)
HCT VFR BLD CALC: 33.3 % — LOW (ref 34.5–45)
HCT VFR BLD CALC: 33.3 % — LOW (ref 34.5–45)
HGB BLD-MCNC: 10.8 G/DL — LOW (ref 11.5–15.5)
HGB BLD-MCNC: 10.8 G/DL — LOW (ref 11.5–15.5)
IANC: 5.77 K/UL — SIGNIFICANT CHANGE UP (ref 1.8–7.4)
IANC: 5.77 K/UL — SIGNIFICANT CHANGE UP (ref 1.8–7.4)
IMM GRANULOCYTES NFR BLD AUTO: 0.4 % — SIGNIFICANT CHANGE UP (ref 0–0.9)
IMM GRANULOCYTES NFR BLD AUTO: 0.4 % — SIGNIFICANT CHANGE UP (ref 0–0.9)
LYMPHOCYTES # BLD AUTO: 1.3 K/UL — SIGNIFICANT CHANGE UP (ref 1–3.3)
LYMPHOCYTES # BLD AUTO: 1.3 K/UL — SIGNIFICANT CHANGE UP (ref 1–3.3)
LYMPHOCYTES # BLD AUTO: 15.2 % — SIGNIFICANT CHANGE UP (ref 13–44)
LYMPHOCYTES # BLD AUTO: 15.2 % — SIGNIFICANT CHANGE UP (ref 13–44)
MAGNESIUM SERPL-MCNC: 1.8 MG/DL — SIGNIFICANT CHANGE UP (ref 1.6–2.6)
MAGNESIUM SERPL-MCNC: 1.8 MG/DL — SIGNIFICANT CHANGE UP (ref 1.6–2.6)
MCHC RBC-ENTMCNC: 28.5 PG — SIGNIFICANT CHANGE UP (ref 27–34)
MCHC RBC-ENTMCNC: 28.5 PG — SIGNIFICANT CHANGE UP (ref 27–34)
MCHC RBC-ENTMCNC: 32.4 GM/DL — SIGNIFICANT CHANGE UP (ref 32–36)
MCHC RBC-ENTMCNC: 32.4 GM/DL — SIGNIFICANT CHANGE UP (ref 32–36)
MCV RBC AUTO: 87.9 FL — SIGNIFICANT CHANGE UP (ref 80–100)
MCV RBC AUTO: 87.9 FL — SIGNIFICANT CHANGE UP (ref 80–100)
MONOCYTES # BLD AUTO: 1.41 K/UL — HIGH (ref 0–0.9)
MONOCYTES # BLD AUTO: 1.41 K/UL — HIGH (ref 0–0.9)
MONOCYTES NFR BLD AUTO: 16.5 % — HIGH (ref 2–14)
MONOCYTES NFR BLD AUTO: 16.5 % — HIGH (ref 2–14)
NEUTROPHILS # BLD AUTO: 5.77 K/UL — SIGNIFICANT CHANGE UP (ref 1.8–7.4)
NEUTROPHILS # BLD AUTO: 5.77 K/UL — SIGNIFICANT CHANGE UP (ref 1.8–7.4)
NEUTROPHILS NFR BLD AUTO: 67.6 % — SIGNIFICANT CHANGE UP (ref 43–77)
NEUTROPHILS NFR BLD AUTO: 67.6 % — SIGNIFICANT CHANGE UP (ref 43–77)
NRBC # BLD: 0 /100 WBCS — SIGNIFICANT CHANGE UP (ref 0–0)
NRBC # BLD: 0 /100 WBCS — SIGNIFICANT CHANGE UP (ref 0–0)
NRBC # FLD: 0 K/UL — SIGNIFICANT CHANGE UP (ref 0–0)
NRBC # FLD: 0 K/UL — SIGNIFICANT CHANGE UP (ref 0–0)
PHOSPHATE SERPL-MCNC: 2.1 MG/DL — LOW (ref 2.5–4.5)
PHOSPHATE SERPL-MCNC: 2.1 MG/DL — LOW (ref 2.5–4.5)
PLATELET # BLD AUTO: 157 K/UL — SIGNIFICANT CHANGE UP (ref 150–400)
PLATELET # BLD AUTO: 157 K/UL — SIGNIFICANT CHANGE UP (ref 150–400)
POTASSIUM SERPL-MCNC: 3.7 MMOL/L — SIGNIFICANT CHANGE UP (ref 3.5–5.3)
POTASSIUM SERPL-MCNC: 3.7 MMOL/L — SIGNIFICANT CHANGE UP (ref 3.5–5.3)
POTASSIUM SERPL-SCNC: 3.7 MMOL/L — SIGNIFICANT CHANGE UP (ref 3.5–5.3)
POTASSIUM SERPL-SCNC: 3.7 MMOL/L — SIGNIFICANT CHANGE UP (ref 3.5–5.3)
PROT SERPL-MCNC: 6.1 G/DL — SIGNIFICANT CHANGE UP (ref 6–8.3)
PROT SERPL-MCNC: 6.1 G/DL — SIGNIFICANT CHANGE UP (ref 6–8.3)
RBC # BLD: 3.79 M/UL — LOW (ref 3.8–5.2)
RBC # BLD: 3.79 M/UL — LOW (ref 3.8–5.2)
RBC # FLD: 13.6 % — SIGNIFICANT CHANGE UP (ref 10.3–14.5)
RBC # FLD: 13.6 % — SIGNIFICANT CHANGE UP (ref 10.3–14.5)
SODIUM SERPL-SCNC: 136 MMOL/L — SIGNIFICANT CHANGE UP (ref 135–145)
SODIUM SERPL-SCNC: 136 MMOL/L — SIGNIFICANT CHANGE UP (ref 135–145)
WBC # BLD: 8.54 K/UL — SIGNIFICANT CHANGE UP (ref 3.8–10.5)
WBC # BLD: 8.54 K/UL — SIGNIFICANT CHANGE UP (ref 3.8–10.5)
WBC # FLD AUTO: 8.54 K/UL — SIGNIFICANT CHANGE UP (ref 3.8–10.5)
WBC # FLD AUTO: 8.54 K/UL — SIGNIFICANT CHANGE UP (ref 3.8–10.5)

## 2023-12-13 PROCEDURE — 99232 SBSQ HOSP IP/OBS MODERATE 35: CPT

## 2023-12-13 RX ORDER — SODIUM CHLORIDE 9 MG/ML
1000 INJECTION INTRAMUSCULAR; INTRAVENOUS; SUBCUTANEOUS ONCE
Refills: 0 | Status: COMPLETED | OUTPATIENT
Start: 2023-12-13 | End: 2023-12-13

## 2023-12-13 RX ADMIN — Medication 650 MILLIGRAM(S): at 18:21

## 2023-12-13 RX ADMIN — SODIUM CHLORIDE 1000 MILLILITER(S): 9 INJECTION INTRAMUSCULAR; INTRAVENOUS; SUBCUTANEOUS at 01:38

## 2023-12-13 RX ADMIN — Medication 650 MILLIGRAM(S): at 17:21

## 2023-12-13 RX ADMIN — Medication 85 MILLIMOLE(S): at 17:15

## 2023-12-13 RX ADMIN — Medication 650 MILLIGRAM(S): at 08:07

## 2023-12-13 RX ADMIN — Medication 650 MILLIGRAM(S): at 07:07

## 2023-12-13 RX ADMIN — ERTAPENEM SODIUM 120 MILLIGRAM(S): 1 INJECTION, POWDER, LYOPHILIZED, FOR SOLUTION INTRAMUSCULAR; INTRAVENOUS at 05:15

## 2023-12-13 NOTE — PROGRESS NOTE ADULT - ATTENDING COMMENTS
Brianna Gr MD  Medicine Attending  Teams preferred/Pager: [phone]    I have personally seen and examined patient. I discussed the case with Dr. Chambers and agree with findings and plan as detailed per note above. 32 y/o F with hx of UTI (ESBL) before in the past admitted for left pyelonephritis. Pt reports she began having fevers and chills since sunday as well as dysuria and urinary freq. then yesterday pt began experiencing left flank pain. no n/v. no other concerns    Exam  VSS  Gen: NAD  HEENT: NC/AT  CVS S1 S2, mild tachycardia no m/r/g  lungs CTA b/l  abd: +BS soft NT/ND  Ext: no edema b/l    #left pyelonephritis  -c/w ertapenum  -f/u urine cultures    #fen/GI  -po as tolerated  -fluid resuscitate as needed Brianna Gr MD  Medicine Attending  Teams preferred/Pager: [phone]    I have personally seen and examined patient. I discussed the case with Dr. Chambers and agree with findings and plan as detailed per note above. 34 y/o F with hx of UTI (ESBL) before in the past admitted for left pyelonephritis. Pt reports she began having fevers and chills since sunday as well as dysuria and urinary freq. then yesterday pt began experiencing left flank pain. no n/v. no other concerns    Exam  VSS  Gen: NAD  HEENT: NC/AT  CVS S1 S2, mild tachycardia no m/r/g  lungs CTA b/l  abd: +BS soft NT/ND  Ext: no edema b/l    #left pyelonephritis  -c/w ertapenum  -f/u urine cultures    #fen/GI  -po as tolerated  -fluid resuscitate as needed

## 2023-12-13 NOTE — PATIENT PROFILE ADULT - FUNCTIONAL ASSESSMENT - BASIC MOBILITY 6.
3-calculated by average/Not able to assess (calculate score using [facility] averaging method)  3-calculated by average/Not able to assess (calculate score using [facility] averaging method)

## 2023-12-13 NOTE — PATIENT PROFILE ADULT - FALL HARM RISK - HARM RISK INTERVENTIONS
Assistance with ambulation/Assistance OOB with selected safe patient handling equipment/Communicate Risk of Fall with Harm to all staff/Discuss with provider need for PT consult/Monitor gait and stability/Provide patient with walking aids - walker, cane, crutches/Reinforce activity limits and safety measures with patient and family/Tailored Fall Risk Interventions/Visual Cue: Yellow wristband and red socks/Bed in lowest position, wheels locked, appropriate side rails in place/Call bell, personal items and telephone in reach/Instruct patient to call for assistance before getting out of bed or chair/Non-slip footwear when patient is out of bed/[place] to call system/Physically safe environment - no spills, clutter or unnecessary equipment/Purposeful Proactive Rounding/Room/bathroom lighting operational, light cord in reach Assistance with ambulation/Assistance OOB with selected safe patient handling equipment/Communicate Risk of Fall with Harm to all staff/Discuss with provider need for PT consult/Monitor gait and stability/Provide patient with walking aids - walker, cane, crutches/Reinforce activity limits and safety measures with patient and family/Tailored Fall Risk Interventions/Visual Cue: Yellow wristband and red socks/Bed in lowest position, wheels locked, appropriate side rails in place/Call bell, personal items and telephone in reach/Instruct patient to call for assistance before getting out of bed or chair/Non-slip footwear when patient is out of bed/[place] to call system/Physically safe environment - no spills, clutter or unnecessary equipment/Purposeful Proactive Rounding/Room/bathroom lighting operational, light cord in reach

## 2023-12-13 NOTE — PROGRESS NOTE ADULT - SUBJECTIVE AND OBJECTIVE BOX
INTERVAL: Overnight, fever and mild hypotension, responsive to Tylenol and IV fluid resuscitation.   SUBJECTIVE: Patient examined bedside this AM.    OBJECTIVE:  Vital Signs Last 24 Hrs  T(C): 37.4 (12 Dec 2023 22:52), Max: 39.3 (12 Dec 2023 21:11)  T(F): 99.3 (12 Dec 2023 22:52), Max: 102.8 (12 Dec 2023 21:11)  HR: 105 (12 Dec 2023 22:52) (104 - 134)  BP: 94/61 (12 Dec 2023 22:52) (94/59 - 106/67)  RR: 18 (12 Dec 2023 22:52) (16 - 18)  SpO2: 100% (12 Dec 2023 22:52) (96% - 100%)    O2 Parameters below as of 12 Dec 2023 22:52  Patient On (Oxygen Delivery Method): room air    PHYSICAL EXAM:  General: NAD, laying in bed  HEENT: PERRLA, EOMI, sclera non-icteric  Respiratory: Clear to ascultation bilaterally, no crackles/rales, no accessory muscle use  Cardiovascular:  RRR, no murmurs/rubs/gallops  Abdomen: Soft, NT, ND; +L CVA tenderness   Extremities: No LE edema   Skin: No rashes or lesions   Neurological: Sensation grossly intact, strength 5/5 in all extremities  Psychiatry: AOx3, appropriate insight/judgement, appropriate affect, recent/remote memory intact    PRN Meds:  acetaminophen     Tablet .. 650 milliGRAM(s) Oral every 6 hours PRN  melatonin 3 milliGRAM(s) Oral at bedtime PRN  ondansetron   Disintegrating Tablet 4 milliGRAM(s) Oral every 8 hours PRN    LABS:                        10.8   8.54  )-----------( 157      ( 13 Dec 2023 05:42 )             33.3     Hgb Trend: 10.8<--, 12.9<--  12-13    136  |  107  |  5<L>  ----------------------------<  94  3.7   |  19<L>  |  0.56    Ca    7.5<L>      13 Dec 2023 05:42  Phos  2.1     12-13  Mg     1.80     12-13    TPro  6.1  /  Alb  3.4  /  TBili  0.4  /  DBili  x   /  AST  25  /  ALT  27  /  AlkPhos  59  12-13    Creatinine Trend: 0.56<--, 0.75<--    PT/INR - ( 12 Dec 2023 10:30 )   PT: 16.7 sec;   INR: 1.50 ratio    PTT - ( 12 Dec 2023 10:30 )  PTT:31.5 sec    Urinalysis Basic - ( 13 Dec 2023 05:42 )  Color: x / Appearance: x / SG: x / pH: x  Gluc: 94 mg/dL / Ketone: x  / Bili: x / Urobili: x   Blood: x / Protein: x / Nitrite: x   Leuk Esterase: x / RBC: x / WBC x   Sq Epi: x / Non Sq Epi: x / Bacteria: x    Venous Blood Gas:  12-12 @ 10:33  7.35/45/37/25/48.7  VBG Lactate: 1.5 INTERVAL: Overnight, fever and mild hypotension, responsive to Tylenol and IV fluid resuscitation.   SUBJECTIVE: Patient examined bedside this AM. Still having chills and sweating, but feels that flank pain is slightly improved.    OBJECTIVE:  Vital Signs Last 24 Hrs  T(C): 37.4 (12 Dec 2023 22:52), Max: 39.3 (12 Dec 2023 21:11)  T(F): 99.3 (12 Dec 2023 22:52), Max: 102.8 (12 Dec 2023 21:11)  HR: 105 (12 Dec 2023 22:52) (104 - 134)  BP: 94/61 (12 Dec 2023 22:52) (94/59 - 106/67)  RR: 18 (12 Dec 2023 22:52) (16 - 18)  SpO2: 100% (12 Dec 2023 22:52) (96% - 100%)    O2 Parameters below as of 12 Dec 2023 22:52  Patient On (Oxygen Delivery Method): room air    PHYSICAL EXAM:  General: NAD, laying in bed  HEENT: PERRLA, EOMI, sclera non-icteric  Respiratory: Clear to ascultation bilaterally, no crackles/rales, no accessory muscle use  Cardiovascular:  RRR, no murmurs/rubs/gallops  Abdomen: Soft, NT, ND; +L CVA tenderness   Extremities: No LE edema   Skin: No rashes or lesions   Neurological: Sensation grossly intact, strength 5/5 in all extremities  Psychiatry: AOx3, appropriate insight/judgement, appropriate affect, recent/remote memory intact    PRN Meds:  acetaminophen     Tablet .. 650 milliGRAM(s) Oral every 6 hours PRN  melatonin 3 milliGRAM(s) Oral at bedtime PRN  ondansetron   Disintegrating Tablet 4 milliGRAM(s) Oral every 8 hours PRN    LABS:                        10.8   8.54  )-----------( 157      ( 13 Dec 2023 05:42 )             33.3     Hgb Trend: 10.8<--, 12.9<--  12-13    136  |  107  |  5<L>  ----------------------------<  94  3.7   |  19<L>  |  0.56    Ca    7.5<L>      13 Dec 2023 05:42  Phos  2.1     12-13  Mg     1.80     12-13    TPro  6.1  /  Alb  3.4  /  TBili  0.4  /  DBili  x   /  AST  25  /  ALT  27  /  AlkPhos  59  12-13    Creatinine Trend: 0.56<--, 0.75<--    PT/INR - ( 12 Dec 2023 10:30 )   PT: 16.7 sec;   INR: 1.50 ratio    PTT - ( 12 Dec 2023 10:30 )  PTT:31.5 sec    Urinalysis Basic - ( 13 Dec 2023 05:42 )  Color: x / Appearance: x / SG: x / pH: x  Gluc: 94 mg/dL / Ketone: x  / Bili: x / Urobili: x   Blood: x / Protein: x / Nitrite: x   Leuk Esterase: x / RBC: x / WBC x   Sq Epi: x / Non Sq Epi: x / Bacteria: x    Venous Blood Gas:  12-12 @ 10:33  7.35/45/37/25/48.7  VBG Lactate: 1.5

## 2023-12-14 ENCOUNTER — TRANSCRIPTION ENCOUNTER (OUTPATIENT)
Age: 33
End: 2023-12-14

## 2023-12-14 LAB
ANION GAP SERPL CALC-SCNC: 13 MMOL/L — SIGNIFICANT CHANGE UP (ref 7–14)
ANION GAP SERPL CALC-SCNC: 13 MMOL/L — SIGNIFICANT CHANGE UP (ref 7–14)
BASOPHILS # BLD AUTO: 0.03 K/UL — SIGNIFICANT CHANGE UP (ref 0–0.2)
BASOPHILS # BLD AUTO: 0.03 K/UL — SIGNIFICANT CHANGE UP (ref 0–0.2)
BASOPHILS NFR BLD AUTO: 0.4 % — SIGNIFICANT CHANGE UP (ref 0–2)
BASOPHILS NFR BLD AUTO: 0.4 % — SIGNIFICANT CHANGE UP (ref 0–2)
BUN SERPL-MCNC: 7 MG/DL — SIGNIFICANT CHANGE UP (ref 7–23)
BUN SERPL-MCNC: 7 MG/DL — SIGNIFICANT CHANGE UP (ref 7–23)
CALCIUM SERPL-MCNC: 8.4 MG/DL — SIGNIFICANT CHANGE UP (ref 8.4–10.5)
CALCIUM SERPL-MCNC: 8.4 MG/DL — SIGNIFICANT CHANGE UP (ref 8.4–10.5)
CHLORIDE SERPL-SCNC: 103 MMOL/L — SIGNIFICANT CHANGE UP (ref 98–107)
CHLORIDE SERPL-SCNC: 103 MMOL/L — SIGNIFICANT CHANGE UP (ref 98–107)
CO2 SERPL-SCNC: 22 MMOL/L — SIGNIFICANT CHANGE UP (ref 22–31)
CO2 SERPL-SCNC: 22 MMOL/L — SIGNIFICANT CHANGE UP (ref 22–31)
CREAT SERPL-MCNC: 0.57 MG/DL — SIGNIFICANT CHANGE UP (ref 0.5–1.3)
CREAT SERPL-MCNC: 0.57 MG/DL — SIGNIFICANT CHANGE UP (ref 0.5–1.3)
EGFR: 122 ML/MIN/1.73M2 — SIGNIFICANT CHANGE UP
EGFR: 122 ML/MIN/1.73M2 — SIGNIFICANT CHANGE UP
EOSINOPHIL # BLD AUTO: 0.08 K/UL — SIGNIFICANT CHANGE UP (ref 0–0.5)
EOSINOPHIL # BLD AUTO: 0.08 K/UL — SIGNIFICANT CHANGE UP (ref 0–0.5)
EOSINOPHIL NFR BLD AUTO: 1.1 % — SIGNIFICANT CHANGE UP (ref 0–6)
EOSINOPHIL NFR BLD AUTO: 1.1 % — SIGNIFICANT CHANGE UP (ref 0–6)
GLUCOSE SERPL-MCNC: 86 MG/DL — SIGNIFICANT CHANGE UP (ref 70–99)
GLUCOSE SERPL-MCNC: 86 MG/DL — SIGNIFICANT CHANGE UP (ref 70–99)
HCT VFR BLD CALC: 34 % — LOW (ref 34.5–45)
HCT VFR BLD CALC: 34 % — LOW (ref 34.5–45)
HGB BLD-MCNC: 11.2 G/DL — LOW (ref 11.5–15.5)
HGB BLD-MCNC: 11.2 G/DL — LOW (ref 11.5–15.5)
IANC: 4.54 K/UL — SIGNIFICANT CHANGE UP (ref 1.8–7.4)
IANC: 4.54 K/UL — SIGNIFICANT CHANGE UP (ref 1.8–7.4)
IMM GRANULOCYTES NFR BLD AUTO: 0.3 % — SIGNIFICANT CHANGE UP (ref 0–0.9)
IMM GRANULOCYTES NFR BLD AUTO: 0.3 % — SIGNIFICANT CHANGE UP (ref 0–0.9)
LYMPHOCYTES # BLD AUTO: 1.6 K/UL — SIGNIFICANT CHANGE UP (ref 1–3.3)
LYMPHOCYTES # BLD AUTO: 1.6 K/UL — SIGNIFICANT CHANGE UP (ref 1–3.3)
LYMPHOCYTES # BLD AUTO: 22 % — SIGNIFICANT CHANGE UP (ref 13–44)
LYMPHOCYTES # BLD AUTO: 22 % — SIGNIFICANT CHANGE UP (ref 13–44)
MAGNESIUM SERPL-MCNC: 2.1 MG/DL — SIGNIFICANT CHANGE UP (ref 1.6–2.6)
MAGNESIUM SERPL-MCNC: 2.1 MG/DL — SIGNIFICANT CHANGE UP (ref 1.6–2.6)
MCHC RBC-ENTMCNC: 28.6 PG — SIGNIFICANT CHANGE UP (ref 27–34)
MCHC RBC-ENTMCNC: 28.6 PG — SIGNIFICANT CHANGE UP (ref 27–34)
MCHC RBC-ENTMCNC: 32.9 GM/DL — SIGNIFICANT CHANGE UP (ref 32–36)
MCHC RBC-ENTMCNC: 32.9 GM/DL — SIGNIFICANT CHANGE UP (ref 32–36)
MCV RBC AUTO: 87 FL — SIGNIFICANT CHANGE UP (ref 80–100)
MCV RBC AUTO: 87 FL — SIGNIFICANT CHANGE UP (ref 80–100)
MONOCYTES # BLD AUTO: 1 K/UL — HIGH (ref 0–0.9)
MONOCYTES # BLD AUTO: 1 K/UL — HIGH (ref 0–0.9)
MONOCYTES NFR BLD AUTO: 13.8 % — SIGNIFICANT CHANGE UP (ref 2–14)
MONOCYTES NFR BLD AUTO: 13.8 % — SIGNIFICANT CHANGE UP (ref 2–14)
NEUTROPHILS # BLD AUTO: 4.54 K/UL — SIGNIFICANT CHANGE UP (ref 1.8–7.4)
NEUTROPHILS # BLD AUTO: 4.54 K/UL — SIGNIFICANT CHANGE UP (ref 1.8–7.4)
NEUTROPHILS NFR BLD AUTO: 62.4 % — SIGNIFICANT CHANGE UP (ref 43–77)
NEUTROPHILS NFR BLD AUTO: 62.4 % — SIGNIFICANT CHANGE UP (ref 43–77)
NRBC # BLD: 0 /100 WBCS — SIGNIFICANT CHANGE UP (ref 0–0)
NRBC # BLD: 0 /100 WBCS — SIGNIFICANT CHANGE UP (ref 0–0)
NRBC # FLD: 0 K/UL — SIGNIFICANT CHANGE UP (ref 0–0)
NRBC # FLD: 0 K/UL — SIGNIFICANT CHANGE UP (ref 0–0)
PHOSPHATE SERPL-MCNC: 3.2 MG/DL — SIGNIFICANT CHANGE UP (ref 2.5–4.5)
PHOSPHATE SERPL-MCNC: 3.2 MG/DL — SIGNIFICANT CHANGE UP (ref 2.5–4.5)
PLATELET # BLD AUTO: 210 K/UL — SIGNIFICANT CHANGE UP (ref 150–400)
PLATELET # BLD AUTO: 210 K/UL — SIGNIFICANT CHANGE UP (ref 150–400)
POTASSIUM SERPL-MCNC: 3.7 MMOL/L — SIGNIFICANT CHANGE UP (ref 3.5–5.3)
POTASSIUM SERPL-MCNC: 3.7 MMOL/L — SIGNIFICANT CHANGE UP (ref 3.5–5.3)
POTASSIUM SERPL-SCNC: 3.7 MMOL/L — SIGNIFICANT CHANGE UP (ref 3.5–5.3)
POTASSIUM SERPL-SCNC: 3.7 MMOL/L — SIGNIFICANT CHANGE UP (ref 3.5–5.3)
RBC # BLD: 3.91 M/UL — SIGNIFICANT CHANGE UP (ref 3.8–5.2)
RBC # BLD: 3.91 M/UL — SIGNIFICANT CHANGE UP (ref 3.8–5.2)
RBC # FLD: 13.5 % — SIGNIFICANT CHANGE UP (ref 10.3–14.5)
RBC # FLD: 13.5 % — SIGNIFICANT CHANGE UP (ref 10.3–14.5)
SODIUM SERPL-SCNC: 138 MMOL/L — SIGNIFICANT CHANGE UP (ref 135–145)
SODIUM SERPL-SCNC: 138 MMOL/L — SIGNIFICANT CHANGE UP (ref 135–145)
WBC # BLD: 7.27 K/UL — SIGNIFICANT CHANGE UP (ref 3.8–10.5)
WBC # BLD: 7.27 K/UL — SIGNIFICANT CHANGE UP (ref 3.8–10.5)
WBC # FLD AUTO: 7.27 K/UL — SIGNIFICANT CHANGE UP (ref 3.8–10.5)
WBC # FLD AUTO: 7.27 K/UL — SIGNIFICANT CHANGE UP (ref 3.8–10.5)

## 2023-12-14 PROCEDURE — 99232 SBSQ HOSP IP/OBS MODERATE 35: CPT

## 2023-12-14 RX ADMIN — ERTAPENEM SODIUM 120 MILLIGRAM(S): 1 INJECTION, POWDER, LYOPHILIZED, FOR SOLUTION INTRAMUSCULAR; INTRAVENOUS at 05:10

## 2023-12-14 RX ADMIN — Medication 650 MILLIGRAM(S): at 08:55

## 2023-12-14 RX ADMIN — Medication 650 MILLIGRAM(S): at 07:55

## 2023-12-14 NOTE — PROGRESS NOTE ADULT - SUBJECTIVE AND OBJECTIVE BOX
INTERVAL: Overnight, fever and mild hypotension, responsive to Tylenol and IV fluid resuscitation.   SUBJECTIVE: Patient examined bedside this AM. Still having chills and sweating, but feels that flank pain is slightly improved.    MEDICATIONS  (STANDING):  ertapenem  IVPB 1000 milliGRAM(s) IV Intermittent every 24 hours      OBJECTIVE:  Vital Signs Last 24 Hrs  T(C): 37.3 (14 Dec 2023 05:38), Max: 37.3 (14 Dec 2023 05:38)  T(F): 99.1 (14 Dec 2023 05:38), Max: 99.1 (14 Dec 2023 05:38)  HR: 93 (14 Dec 2023 05:38) (83 - 93)  BP: 117/78 (14 Dec 2023 05:38) (104/59 - 117/78)  BP(mean): --  RR: 18 (14 Dec 2023 05:38) (18 - 18)  SpO2: 100% (14 Dec 2023 05:38) (100% - 100%)    Parameters below as of 14 Dec 2023 05:38  Patient On (Oxygen Delivery Method): room air      PHYSICAL EXAM:  General: NAD, laying in bed  HEENT: PERRLA, EOMI, sclera non-icteric  Respiratory: Clear to ascultation bilaterally, no crackles/rales, no accessory muscle use  Cardiovascular:  RRR, no murmurs/rubs/gallops  Abdomen: Soft, NT, ND; +L CVA tenderness   Extremities: No LE edema   Skin: No rashes or lesions   Neurological: Sensation grossly intact, strength 5/5 in all extremities  Psychiatry: AOx3, appropriate insight/judgement, appropriate affect, recent/remote memory intact      LABS:                        10.8   8.54  )-----------( 157      ( 13 Dec 2023 05:42 )             33.3     Hgb Trend: 10.8<--, 12.9<--  12-13    136  |  107  |  5<L>  ----------------------------<  94  3.7   |  19<L>  |  0.56    Ca    7.5<L>      13 Dec 2023 05:42  Phos  2.1     12-13  Mg     1.80     12-13    TPro  6.1  /  Alb  3.4  /  TBili  0.4  /  DBili  x   /  AST  25  /  ALT  27  /  AlkPhos  59  12-13    Creatinine Trend: 0.56<--, 0.75<--    PT/INR - ( 12 Dec 2023 10:30 )   PT: 16.7 sec;   INR: 1.50 ratio    PTT - ( 12 Dec 2023 10:30 )  PTT:31.5 sec    Urinalysis Basic - ( 13 Dec 2023 05:42 )  Color: x / Appearance: x / SG: x / pH: x  Gluc: 94 mg/dL / Ketone: x  / Bili: x / Urobili: x   Blood: x / Protein: x / Nitrite: x   Leuk Esterase: x / RBC: x / WBC x   Sq Epi: x / Non Sq Epi: x / Bacteria: x    Venous Blood Gas:  12-12 @ 10:33  7.35/45/37/25/48.7  VBG Lactate: 1.5 INTERVAL: Overnight, fever and mild hypotension, responsive to Tylenol and IV fluid resuscitation.   SUBJECTIVE: Patient examined bedside this AM. Still having sweating, but feels that flank pain is improved.    MEDICATIONS  (STANDING):  ertapenem  IVPB 1000 milliGRAM(s) IV Intermittent every 24 hours      OBJECTIVE:  Vital Signs Last 24 Hrs  T(C): 37.3 (14 Dec 2023 05:38), Max: 37.3 (14 Dec 2023 05:38)  T(F): 99.1 (14 Dec 2023 05:38), Max: 99.1 (14 Dec 2023 05:38)  HR: 93 (14 Dec 2023 05:38) (83 - 93)  BP: 117/78 (14 Dec 2023 05:38) (104/59 - 117/78)  BP(mean): --  RR: 18 (14 Dec 2023 05:38) (18 - 18)  SpO2: 100% (14 Dec 2023 05:38) (100% - 100%)    Parameters below as of 14 Dec 2023 05:38  Patient On (Oxygen Delivery Method): room air      PHYSICAL EXAM:  General: NAD, laying in bed  HEENT: PERRLA, EOMI, sclera non-icteric  Respiratory: Clear to ascultation bilaterally, no crackles/rales, no accessory muscle use  Cardiovascular:  RRR, no murmurs/rubs/gallops  Abdomen: Soft, NT, ND; +L CVA tenderness   Extremities: No LE edema   Skin: No rashes or lesions   Neurological: Sensation grossly intact, strength 5/5 in all extremities  Psychiatry: AOx3, appropriate insight/judgement, appropriate affect, recent/remote memory intact      LABS:                                   11.2   7.27  )-----------( 210      ( 14 Dec 2023 07:46 )             34.0     12-14    138  |  103  |  7   ----------------------------<  86  3.7   |  22  |  0.57    Ca    8.4      14 Dec 2023 07:46  Phos  3.2     12-14  Mg     2.10     12-14    TPro  6.1  /  Alb  3.4  /  TBili  0.4  /  DBili  x   /  AST  25  /  ALT  27  /  AlkPhos  59  12-13    Urinalysis Basic - ( 13 Dec 2023 05:42 )  Color: x / Appearance: x / SG: x / pH: x  Gluc: 94 mg/dL / Ketone: x  / Bili: x / Urobili: x   Blood: x / Protein: x / Nitrite: x   Leuk Esterase: x / RBC: x / WBC x   Sq Epi: x / Non Sq Epi: x / Bacteria: x    Venous Blood Gas:  12-12 @ 10:33  7.35/45/37/25/48.7  VBG Lactate: 1.5

## 2023-12-14 NOTE — DISCHARGE NOTE PROVIDER - NSDCMRMEDTOKEN_GEN_ALL_CORE_FT
ciprofloxacin 500 mg oral tablet: 1 tab(s) orally 2 times a day Please take one tablet twice daily for 7 days.

## 2023-12-14 NOTE — DISCHARGE NOTE PROVIDER - CARE PROVIDER_API CALL
Citlali Lopez [facility]  Internal Medicine  [address] [place] [id number]  Phone: [phone]  Fax: [phone]  Follow Up Time:    Citlali Lopez [facility]  Internal Medicine  [address] [place] [id number]  Phone: [phone]  Fax: [phone]  Follow Up Time:

## 2023-12-14 NOTE — DISCHARGE NOTE PROVIDER - HOSPITAL COURSE
33 F w/ no PMH presenting with a two-day history of L flank pain and fevers to 103. She also endorses burning with urination and foul-smelling urine and states that it feels like UTIs she has had in the past. Of note, the patient has had urine cultures in the past growing ESBL E. coli. She also has nausea today but no vomiting. She denies any headache, dizziness, abdominal pain, diarrhea, or rashes. CTAP with L pyelonephritis, positive UA and UCx with Ecoli. Pt started on ertapenem for previous history of ESBL. UCX sensitivities showed..     33 F w/ no PMH presenting with a two-day history of L flank pain and fevers to 103. She also endorses burning with urination and foul-smelling urine and states that it feels like UTIs she has had in the past. Of note, the patient has had urine cultures in the past growing ESBL E. coli. She also has nausea today but no vomiting. She denies any headache, dizziness, abdominal pain, diarrhea, or rashes. CTAP with L pyelonephritis, positive UA and UCx with Ecoli. Pt started on ertapenem for previous history of ESBL. The patient was stable for discharge on oral antibiotics based on the culture sensitivities.     33 F w/ no PMH presenting with a two-day history of L flank pain and fevers to 103. She also endorses burning with urination and foul-smelling urine and states that it feels like UTIs she has had in the past. Of note, the patient has had urine cultures in the past growing ESBL E. coli. She also has nausea today but no vomiting. She denies any headache, dizziness, abdominal pain, diarrhea, or rashes. CTAP with L pyelonephritis, positive UA and UCx with Ecoli. Pt started on ertapenem for previous history of ESBL. The patient was stable for discharge on oral ciprofloxacin based on the culture sensitivities to complete her course of antibiotics.     33 F w/ no PMH presenting with a two-day history of L flank pain and fevers to 103. She also endorses burning with urination and foul-smelling urine and states that it feels like UTIs she has had in the past. Of note, the patient has had urine cultures in the past growing ESBL E. coli. She also has nausea today but no vomiting. She denies any headache, dizziness, abdominal pain, diarrhea, or rashes. CTAP with L pyelonephritis, positive UA and UCx with Ecoli. Pt started on ertapenem for previous history of ESBL. The patient was stable for discharge on oral ciprofloxacin based on the culture sensitivities to complete her course of antibiotic.

## 2023-12-14 NOTE — PROGRESS NOTE ADULT - PROBLEM SELECTOR PLAN 1
CT suggestive of L pyelonephritis and positive UA  CVA tenderness, fever, dysuria  Initially tachycardic when presented to ED  Of note, history of ESBL E. coli on urine culture    Plan:  -Continue ertapenem 12/12-  -F/u urine culture  -Tylenol PRN fever  -CTM vitals, WBCs CT suggestive of L pyelonephritis and positive UA  CVA tenderness, fever, dysuria  Initially tachycardic when presented to ED  Of note, history of ESBL E. coli on urine culture    Plan:  -Continue ertapenem 12/12-  -UCx with ecoli, f/u sensitivities  -Tylenol PRN fever  -CTM vitals, WBCs

## 2023-12-14 NOTE — PROGRESS NOTE ADULT - ATTENDING COMMENTS
Brianna Gr MD  Medicine Attending  Teams preferred/Pager: [phone]    I have personally seen and examined patient. I discussed the case with Dr. Monsalve and agree with findings and plan as detailed per note above. 34 y/o F with hx of UTI (ESBL) before in the past admitted for left pyelonephritis. flank pain and dysuria resolved. Pt still having sweats. otherwise feels well    Exam  VSS  Gen: NAD  HEENT: NC/AT  CVS S1 S2, mild tachycardia no m/r/g  lungs CTA b/l  abd: +BS soft NT/ND  Ext: no edema b/l    #left pyelonephritis  -c/w ertapenum  -f/u urine cultures    #fen/GI  -po as tolerated  -fluid resuscitate as needed Brianna Gr MD  Medicine Attending  Teams preferred/Pager: [phone]    I have personally seen and examined patient. I discussed the case with Dr. Monsalve and agree with findings and plan as detailed per note above. 34 y/o F with hx of UTI (ESBL) before in the past admitted for left pyelonephritis. flank pain and dysuria resolved. Pt still having sweats. otherwise feels well    Exam  VSS  Gen: NAD  HEENT: NC/AT  CVS S1 S2, mild tachycardia no m/r/g  lungs CTA b/l  abd: +BS soft NT/ND  Ext: no edema b/l    #left pyelonephritis  -c/w ertapenum  -f/u urine cultures    #fen/GI  -po as tolerated  -fluid resuscitate as needed

## 2023-12-14 NOTE — DISCHARGE NOTE PROVIDER - NSDCCPCAREPLAN_GEN_ALL_CORE_FT
PRINCIPAL DISCHARGE DIAGNOSIS  Diagnosis: Pyelonephritis  Assessment and Plan of Treatment: You were found to have an infection of your kidney. Antibiotics were given in the hospital. Please take.... Please follow up with your primary care doctor within 1-2 weeks of discharge. Please return to the hospital if you have fevers, flank pain, and burning with urination.     PRINCIPAL DISCHARGE DIAGNOSIS  Diagnosis: Pyelonephritis  Assessment and Plan of Treatment: You were found to have an infection of your kidney. Antibiotics were given in the hospital. Please take the remaining antibiotics that were sent to your pharmacy until you complete the entire bottle. Please do not stop taking the medication even if you feel better.   .  Please follow up with your primary care doctor within 1-2 weeks of discharge. Please return to the hospital if you have fevers, flank pain, and burning with urination.

## 2023-12-14 NOTE — DISCHARGE NOTE PROVIDER - NSDCCPTREATMENT_GEN_ALL_CORE_FT
PRINCIPAL PROCEDURE  Procedure: CT abdomen  Findings and Treatment: FINDINGS:  LOWER CHEST: Within normal limits.  LIVER: Within normal limits.  BILE DUCTS: Normal caliber.  GALLBLADDER: Within normal limits.  SPLEEN: Within normal limits.  PANCREAS: Within normal limits.  ADRENALS: Within normal limits.  KIDNEYS/URETERS: Mild bilateral hydronephrosis likely due to distended   bladder. There are ill-defined areas of decreased enhancement in the left   kidney predominating in the mid to lower pole suggestive of pyelonephritis  BLADDER: Moderatelydistended.  REPRODUCTIVE ORGANS: IUD appears normally positioned in the uterus. There   is no adnexal mass. Involuting cyst is suggested in the right ovary  BOWEL: No bowel obstruction. Appendix is normal.  PERITONEUM: No ascites.  VESSELS: Within normal limits.  RETROPERITONEUM/LYMPH NODES: No lymphadenopathy.  ABDOMINAL WALL: Within normal limits.  BONES: Within normal limits. Incidental note is made of limbus vertebra   involving the anterosuperior aspect of L4  IMPRESSION:  Mild bilateralhydronephrosis likely due to significant bladder distention  Enhancement pattern of the left kidney is suggestive of pyelonephritis< from: CT Abdomen and Pelvis w/ IV Cont (12.12.23 @ 12:38) >

## 2023-12-15 LAB
-  AMOXICILLIN/CLAVULANIC ACID: SIGNIFICANT CHANGE UP
-  AMOXICILLIN/CLAVULANIC ACID: SIGNIFICANT CHANGE UP
-  AMPICILLIN/SULBACTAM: SIGNIFICANT CHANGE UP
-  AMPICILLIN/SULBACTAM: SIGNIFICANT CHANGE UP
-  AMPICILLIN: SIGNIFICANT CHANGE UP
-  AMPICILLIN: SIGNIFICANT CHANGE UP
-  AZTREONAM: SIGNIFICANT CHANGE UP
-  AZTREONAM: SIGNIFICANT CHANGE UP
-  CEFAZOLIN: SIGNIFICANT CHANGE UP
-  CEFAZOLIN: SIGNIFICANT CHANGE UP
-  CEFEPIME: SIGNIFICANT CHANGE UP
-  CEFEPIME: SIGNIFICANT CHANGE UP
-  CEFOXITIN: SIGNIFICANT CHANGE UP
-  CEFOXITIN: SIGNIFICANT CHANGE UP
-  CEFTRIAXONE: SIGNIFICANT CHANGE UP
-  CEFTRIAXONE: SIGNIFICANT CHANGE UP
-  CEFUROXIME: SIGNIFICANT CHANGE UP
-  CEFUROXIME: SIGNIFICANT CHANGE UP
-  CIPROFLOXACIN: SIGNIFICANT CHANGE UP
-  CIPROFLOXACIN: SIGNIFICANT CHANGE UP
-  ERTAPENEM: SIGNIFICANT CHANGE UP
-  ERTAPENEM: SIGNIFICANT CHANGE UP
-  GENTAMICIN: SIGNIFICANT CHANGE UP
-  GENTAMICIN: SIGNIFICANT CHANGE UP
-  IMIPENEM: SIGNIFICANT CHANGE UP
-  IMIPENEM: SIGNIFICANT CHANGE UP
-  LEVOFLOXACIN: SIGNIFICANT CHANGE UP
-  LEVOFLOXACIN: SIGNIFICANT CHANGE UP
-  MEROPENEM: SIGNIFICANT CHANGE UP
-  MEROPENEM: SIGNIFICANT CHANGE UP
-  NITROFURANTOIN: SIGNIFICANT CHANGE UP
-  NITROFURANTOIN: SIGNIFICANT CHANGE UP
-  PIPERACILLIN/TAZOBACTAM: SIGNIFICANT CHANGE UP
-  PIPERACILLIN/TAZOBACTAM: SIGNIFICANT CHANGE UP
-  TOBRAMYCIN: SIGNIFICANT CHANGE UP
-  TOBRAMYCIN: SIGNIFICANT CHANGE UP
-  TRIMETHOPRIM/SULFAMETHOXAZOLE: SIGNIFICANT CHANGE UP
-  TRIMETHOPRIM/SULFAMETHOXAZOLE: SIGNIFICANT CHANGE UP
ANION GAP SERPL CALC-SCNC: 10 MMOL/L — SIGNIFICANT CHANGE UP (ref 7–14)
ANION GAP SERPL CALC-SCNC: 10 MMOL/L — SIGNIFICANT CHANGE UP (ref 7–14)
BASOPHILS # BLD AUTO: 0.03 K/UL — SIGNIFICANT CHANGE UP (ref 0–0.2)
BASOPHILS # BLD AUTO: 0.03 K/UL — SIGNIFICANT CHANGE UP (ref 0–0.2)
BASOPHILS NFR BLD AUTO: 0.5 % — SIGNIFICANT CHANGE UP (ref 0–2)
BASOPHILS NFR BLD AUTO: 0.5 % — SIGNIFICANT CHANGE UP (ref 0–2)
BUN SERPL-MCNC: 11 MG/DL — SIGNIFICANT CHANGE UP (ref 7–23)
BUN SERPL-MCNC: 11 MG/DL — SIGNIFICANT CHANGE UP (ref 7–23)
CALCIUM SERPL-MCNC: 8.7 MG/DL — SIGNIFICANT CHANGE UP (ref 8.4–10.5)
CALCIUM SERPL-MCNC: 8.7 MG/DL — SIGNIFICANT CHANGE UP (ref 8.4–10.5)
CHLORIDE SERPL-SCNC: 104 MMOL/L — SIGNIFICANT CHANGE UP (ref 98–107)
CHLORIDE SERPL-SCNC: 104 MMOL/L — SIGNIFICANT CHANGE UP (ref 98–107)
CO2 SERPL-SCNC: 24 MMOL/L — SIGNIFICANT CHANGE UP (ref 22–31)
CO2 SERPL-SCNC: 24 MMOL/L — SIGNIFICANT CHANGE UP (ref 22–31)
CREAT SERPL-MCNC: 0.66 MG/DL — SIGNIFICANT CHANGE UP (ref 0.5–1.3)
CREAT SERPL-MCNC: 0.66 MG/DL — SIGNIFICANT CHANGE UP (ref 0.5–1.3)
CULTURE RESULTS: ABNORMAL
CULTURE RESULTS: ABNORMAL
EGFR: 118 ML/MIN/1.73M2 — SIGNIFICANT CHANGE UP
EGFR: 118 ML/MIN/1.73M2 — SIGNIFICANT CHANGE UP
EOSINOPHIL # BLD AUTO: 0.14 K/UL — SIGNIFICANT CHANGE UP (ref 0–0.5)
EOSINOPHIL # BLD AUTO: 0.14 K/UL — SIGNIFICANT CHANGE UP (ref 0–0.5)
EOSINOPHIL NFR BLD AUTO: 2.5 % — SIGNIFICANT CHANGE UP (ref 0–6)
EOSINOPHIL NFR BLD AUTO: 2.5 % — SIGNIFICANT CHANGE UP (ref 0–6)
GLUCOSE SERPL-MCNC: 98 MG/DL — SIGNIFICANT CHANGE UP (ref 70–99)
GLUCOSE SERPL-MCNC: 98 MG/DL — SIGNIFICANT CHANGE UP (ref 70–99)
HCT VFR BLD CALC: 36 % — SIGNIFICANT CHANGE UP (ref 34.5–45)
HCT VFR BLD CALC: 36 % — SIGNIFICANT CHANGE UP (ref 34.5–45)
HGB BLD-MCNC: 11.5 G/DL — SIGNIFICANT CHANGE UP (ref 11.5–15.5)
HGB BLD-MCNC: 11.5 G/DL — SIGNIFICANT CHANGE UP (ref 11.5–15.5)
IANC: 3.1 K/UL — SIGNIFICANT CHANGE UP (ref 1.8–7.4)
IANC: 3.1 K/UL — SIGNIFICANT CHANGE UP (ref 1.8–7.4)
IMM GRANULOCYTES NFR BLD AUTO: 0.2 % — SIGNIFICANT CHANGE UP (ref 0–0.9)
IMM GRANULOCYTES NFR BLD AUTO: 0.2 % — SIGNIFICANT CHANGE UP (ref 0–0.9)
LYMPHOCYTES # BLD AUTO: 1.65 K/UL — SIGNIFICANT CHANGE UP (ref 1–3.3)
LYMPHOCYTES # BLD AUTO: 1.65 K/UL — SIGNIFICANT CHANGE UP (ref 1–3.3)
LYMPHOCYTES # BLD AUTO: 29.1 % — SIGNIFICANT CHANGE UP (ref 13–44)
LYMPHOCYTES # BLD AUTO: 29.1 % — SIGNIFICANT CHANGE UP (ref 13–44)
MAGNESIUM SERPL-MCNC: 2.1 MG/DL — SIGNIFICANT CHANGE UP (ref 1.6–2.6)
MAGNESIUM SERPL-MCNC: 2.1 MG/DL — SIGNIFICANT CHANGE UP (ref 1.6–2.6)
MCHC RBC-ENTMCNC: 27.9 PG — SIGNIFICANT CHANGE UP (ref 27–34)
MCHC RBC-ENTMCNC: 27.9 PG — SIGNIFICANT CHANGE UP (ref 27–34)
MCHC RBC-ENTMCNC: 31.9 GM/DL — LOW (ref 32–36)
MCHC RBC-ENTMCNC: 31.9 GM/DL — LOW (ref 32–36)
MCV RBC AUTO: 87.4 FL — SIGNIFICANT CHANGE UP (ref 80–100)
MCV RBC AUTO: 87.4 FL — SIGNIFICANT CHANGE UP (ref 80–100)
METHOD TYPE: SIGNIFICANT CHANGE UP
METHOD TYPE: SIGNIFICANT CHANGE UP
MONOCYTES # BLD AUTO: 0.74 K/UL — SIGNIFICANT CHANGE UP (ref 0–0.9)
MONOCYTES # BLD AUTO: 0.74 K/UL — SIGNIFICANT CHANGE UP (ref 0–0.9)
MONOCYTES NFR BLD AUTO: 13.1 % — SIGNIFICANT CHANGE UP (ref 2–14)
MONOCYTES NFR BLD AUTO: 13.1 % — SIGNIFICANT CHANGE UP (ref 2–14)
NEUTROPHILS # BLD AUTO: 3.1 K/UL — SIGNIFICANT CHANGE UP (ref 1.8–7.4)
NEUTROPHILS # BLD AUTO: 3.1 K/UL — SIGNIFICANT CHANGE UP (ref 1.8–7.4)
NEUTROPHILS NFR BLD AUTO: 54.6 % — SIGNIFICANT CHANGE UP (ref 43–77)
NEUTROPHILS NFR BLD AUTO: 54.6 % — SIGNIFICANT CHANGE UP (ref 43–77)
NRBC # BLD: 0 /100 WBCS — SIGNIFICANT CHANGE UP (ref 0–0)
NRBC # BLD: 0 /100 WBCS — SIGNIFICANT CHANGE UP (ref 0–0)
NRBC # FLD: 0 K/UL — SIGNIFICANT CHANGE UP (ref 0–0)
NRBC # FLD: 0 K/UL — SIGNIFICANT CHANGE UP (ref 0–0)
ORGANISM # SPEC MICROSCOPIC CNT: ABNORMAL
PHOSPHATE SERPL-MCNC: 3.6 MG/DL — SIGNIFICANT CHANGE UP (ref 2.5–4.5)
PHOSPHATE SERPL-MCNC: 3.6 MG/DL — SIGNIFICANT CHANGE UP (ref 2.5–4.5)
PLATELET # BLD AUTO: 247 K/UL — SIGNIFICANT CHANGE UP (ref 150–400)
PLATELET # BLD AUTO: 247 K/UL — SIGNIFICANT CHANGE UP (ref 150–400)
POTASSIUM SERPL-MCNC: 4.1 MMOL/L — SIGNIFICANT CHANGE UP (ref 3.5–5.3)
POTASSIUM SERPL-MCNC: 4.1 MMOL/L — SIGNIFICANT CHANGE UP (ref 3.5–5.3)
POTASSIUM SERPL-SCNC: 4.1 MMOL/L — SIGNIFICANT CHANGE UP (ref 3.5–5.3)
POTASSIUM SERPL-SCNC: 4.1 MMOL/L — SIGNIFICANT CHANGE UP (ref 3.5–5.3)
RBC # BLD: 4.12 M/UL — SIGNIFICANT CHANGE UP (ref 3.8–5.2)
RBC # BLD: 4.12 M/UL — SIGNIFICANT CHANGE UP (ref 3.8–5.2)
RBC # FLD: 13.4 % — SIGNIFICANT CHANGE UP (ref 10.3–14.5)
RBC # FLD: 13.4 % — SIGNIFICANT CHANGE UP (ref 10.3–14.5)
SODIUM SERPL-SCNC: 138 MMOL/L — SIGNIFICANT CHANGE UP (ref 135–145)
SODIUM SERPL-SCNC: 138 MMOL/L — SIGNIFICANT CHANGE UP (ref 135–145)
SPECIMEN SOURCE: SIGNIFICANT CHANGE UP
SPECIMEN SOURCE: SIGNIFICANT CHANGE UP
WBC # BLD: 5.67 K/UL — SIGNIFICANT CHANGE UP (ref 3.8–10.5)
WBC # BLD: 5.67 K/UL — SIGNIFICANT CHANGE UP (ref 3.8–10.5)
WBC # FLD AUTO: 5.67 K/UL — SIGNIFICANT CHANGE UP (ref 3.8–10.5)
WBC # FLD AUTO: 5.67 K/UL — SIGNIFICANT CHANGE UP (ref 3.8–10.5)

## 2023-12-15 PROCEDURE — 99232 SBSQ HOSP IP/OBS MODERATE 35: CPT

## 2023-12-15 RX ADMIN — ERTAPENEM SODIUM 120 MILLIGRAM(S): 1 INJECTION, POWDER, LYOPHILIZED, FOR SOLUTION INTRAMUSCULAR; INTRAVENOUS at 05:27

## 2023-12-15 RX ADMIN — Medication 650 MILLIGRAM(S): at 12:57

## 2023-12-15 RX ADMIN — Medication 650 MILLIGRAM(S): at 11:57

## 2023-12-15 NOTE — PROGRESS NOTE ADULT - SUBJECTIVE AND OBJECTIVE BOX
INTERVAL: No acute overnight events.   SUBJECTIVE: Patient examined bedside this AM.    OBJECTIVE:  Vital Signs Last 24 Hrs  T(C): 36.9 (15 Dec 2023 05:25), Max: 36.9 (15 Dec 2023 05:25)  T(F): 98.4 (15 Dec 2023 05:25), Max: 98.4 (15 Dec 2023 05:25)  HR: 77 (15 Dec 2023 05:25) (75 - 88)  BP: 102/61 (15 Dec 2023 05:25) (98/69 - 108/67)  RR: 18 (15 Dec 2023 05:25) (17 - 18)  SpO2: 99% (15 Dec 2023 05:25) (98% - 100%)    O2 Parameters below as of 15 Dec 2023 05:25  Patient On (Oxygen Delivery Method): room air    PHYSICAL EXAM:  General: NAD, laying in bed  HEENT: PERRLA, EOMI, sclera non-icteric  Neck: JVD absent  Respiratory: Clear to ascultation bilaterally, no crackles/rales, no accessory muscle use  Cardiovascular: RRR, no murmurs/rubs/gallops  Abdomen: Soft, NT, ND  Extremities: No LE edema  Skin: No rashes or lesions   Neurological: Sensation grossly intact, strength 5/5 in all extremities  Psychiatry: AOx3, appropriate insight/judgement, appropriate affect, recent/remote memory intact    PRN Meds:  acetaminophen     Tablet .. 650 milliGRAM(s) Oral every 6 hours PRN  melatonin 3 milliGRAM(s) Oral at bedtime PRN  ondansetron   Disintegrating Tablet 4 milliGRAM(s) Oral every 8 hours PRN    LABS:                        11.5   5.67  )-----------( 247      ( 15 Dec 2023 05:15 )             36.0     Hgb Trend: 11.5<--, 11.2<--, 10.8<--, 12.9<--  12-15    138  |  104  |  11  ----------------------------<  98  4.1   |  24  |  0.66    Ca    8.7      15 Dec 2023 05:15  Phos  3.6     12-15  Mg     2.10     12-15    Creatinine Trend: 0.66<--, 0.57<--, 0.56<--, 0.75<--    Urinalysis Basic - ( 15 Dec 2023 05:15 )  Color: x / Appearance: x / SG: x / pH: x  Gluc: 98 mg/dL / Ketone: x  / Bili: x / Urobili: x   Blood: x / Protein: x / Nitrite: x   Leuk Esterase: x / RBC: x / WBC x   Sq Epi: x / Non Sq Epi: x / Bacteria: x    MICROBIOLOGY:   Culture - Urine (collected 12 Dec 2023 10:52)  Source: Clean Catch Clean Catch (Midstream)  Preliminary Report (14 Dec 2023 08:27):    >100,000 CFU/ml Escherichia coli    Culture - Blood (collected 12 Dec 2023 10:15)  Source: .Blood Blood-Peripheral  Preliminary Report (14 Dec 2023 18:02):    No growth at 48 Hours    Culture - Blood (collected 12 Dec 2023 10:00)  Source: .Blood Blood-Peripheral  Preliminary Report (14 Dec 2023 18:02):    No growth at 48 Hours INTERVAL: No acute overnight events.   SUBJECTIVE: Patient examined bedside this AM. No current concerns, feeling significantly better.     OBJECTIVE:  Vital Signs Last 24 Hrs  T(C): 36.9 (15 Dec 2023 05:25), Max: 36.9 (15 Dec 2023 05:25)  T(F): 98.4 (15 Dec 2023 05:25), Max: 98.4 (15 Dec 2023 05:25)  HR: 77 (15 Dec 2023 05:25) (75 - 88)  BP: 102/61 (15 Dec 2023 05:25) (98/69 - 108/67)  RR: 18 (15 Dec 2023 05:25) (17 - 18)  SpO2: 99% (15 Dec 2023 05:25) (98% - 100%)    O2 Parameters below as of 15 Dec 2023 05:25  Patient On (Oxygen Delivery Method): room air    PHYSICAL EXAM:  General: NAD, laying in bed  HEENT: PERRLA, EOMI, sclera non-icteric  Neck: JVD absent  Respiratory: Clear to ascultation bilaterally, no crackles/rales, no accessory muscle use  Cardiovascular: RRR, no murmurs/rubs/gallops  Abdomen: Soft, NT, ND  Extremities: No LE edema  Skin: No rashes or lesions   Neurological: Sensation grossly intact, strength 5/5 in all extremities  Psychiatry: AOx3, appropriate insight/judgement, appropriate affect, recent/remote memory intact    PRN Meds:  acetaminophen     Tablet .. 650 milliGRAM(s) Oral every 6 hours PRN  melatonin 3 milliGRAM(s) Oral at bedtime PRN  ondansetron   Disintegrating Tablet 4 milliGRAM(s) Oral every 8 hours PRN    LABS:                        11.5   5.67  )-----------( 247      ( 15 Dec 2023 05:15 )             36.0     Hgb Trend: 11.5<--, 11.2<--, 10.8<--, 12.9<--  12-15    138  |  104  |  11  ----------------------------<  98  4.1   |  24  |  0.66    Ca    8.7      15 Dec 2023 05:15  Phos  3.6     12-15  Mg     2.10     12-15    Creatinine Trend: 0.66<--, 0.57<--, 0.56<--, 0.75<--    Urinalysis Basic - ( 15 Dec 2023 05:15 )  Color: x / Appearance: x / SG: x / pH: x  Gluc: 98 mg/dL / Ketone: x  / Bili: x / Urobili: x   Blood: x / Protein: x / Nitrite: x   Leuk Esterase: x / RBC: x / WBC x   Sq Epi: x / Non Sq Epi: x / Bacteria: x    MICROBIOLOGY:   Culture - Urine (collected 12 Dec 2023 10:52)  Source: Clean Catch Clean Catch (Midstream)  Preliminary Report (14 Dec 2023 08:27):    >100,000 CFU/ml Escherichia coli    Culture - Blood (collected 12 Dec 2023 10:15)  Source: .Blood Blood-Peripheral  Preliminary Report (14 Dec 2023 18:02):    No growth at 48 Hours    Culture - Blood (collected 12 Dec 2023 10:00)  Source: .Blood Blood-Peripheral  Preliminary Report (14 Dec 2023 18:02):    No growth at 48 Hours

## 2023-12-15 NOTE — PROGRESS NOTE ADULT - ATTENDING COMMENTS
Brianna Gr MD  Medicine Attending  Teams preferred/Pager: [phone]    I have personally seen and examined patient. I discussed the case with Dr. Chambers and agree with findings and plan as detailed per note above. 32 y/o F with hx of UTI (ESBL) before in the past admitted for left pyelonephritis. flank pain and dysuria resolved. mild sweats this am but afebrile. labs improved. sensitivities pending    Exam  VSS  Gen: NAD  HEENT: NC/AT  CVS S1 S2, mild tachycardia no m/r/g  lungs CTA b/l  abd: +BS soft NT/ND  Ext: no edema b/l    #left pyelonephritis  -c/w ertapenum  -f/u urine cultures-if able to take po abx may discharge today (hopefully levaquin sensitive)    #fen/GI  -po as tolerated  -fluid resuscitate as needed Brianna Gr MD  Medicine Attending  Teams preferred/Pager: [phone]    I have personally seen and examined patient. I discussed the case with Dr. Chambers and agree with findings and plan as detailed per note above. 34 y/o F with hx of UTI (ESBL) before in the past admitted for left pyelonephritis. flank pain and dysuria resolved. mild sweats this am but afebrile. labs improved. sensitivities pending    Exam  VSS  Gen: NAD  HEENT: NC/AT  CVS S1 S2, mild tachycardia no m/r/g  lungs CTA b/l  abd: +BS soft NT/ND  Ext: no edema b/l    #left pyelonephritis  -c/w ertapenum  -f/u urine cultures-if able to take po abx may discharge today (hopefully levaquin sensitive)    #fen/GI  -po as tolerated  -fluid resuscitate as needed

## 2023-12-15 NOTE — PROVIDER CONTACT NOTE (OTHER) - ACTION/TREATMENT ORDERED:
NS bolus ordered. Care plan continues
As per MD, " Pt is asymptotic this is fine and please continue to monitor.

## 2023-12-15 NOTE — PROVIDER CONTACT NOTE (OTHER) - BACKGROUND
Admit dx Tubulointerstitial nephritis. no pertinent past medical hx
Admitted for fever and abdominal pain

## 2023-12-15 NOTE — PROGRESS NOTE ADULT - PROBLEM SELECTOR PLAN 1
CT suggestive of L pyelonephritis and positive UA  CVA tenderness, fever, dysuria  Initially tachycardic when presented to ED  Of note, history of ESBL E. coli on urine culture    Plan:  -Continue ertapenem 12/12-  -UCx with ecoli, f/u sensitivities  -Tylenol PRN fever  -CTM vitals, WBCs

## 2023-12-16 ENCOUNTER — TRANSCRIPTION ENCOUNTER (OUTPATIENT)
Age: 33
End: 2023-12-16

## 2023-12-16 VITALS
HEART RATE: 74 BPM | TEMPERATURE: 98 F | SYSTOLIC BLOOD PRESSURE: 119 MMHG | RESPIRATION RATE: 18 BRPM | OXYGEN SATURATION: 100 % | DIASTOLIC BLOOD PRESSURE: 84 MMHG

## 2023-12-16 LAB
BASOPHILS # BLD AUTO: 0.03 K/UL — SIGNIFICANT CHANGE UP (ref 0–0.2)
BASOPHILS # BLD AUTO: 0.03 K/UL — SIGNIFICANT CHANGE UP (ref 0–0.2)
BASOPHILS NFR BLD AUTO: 0.7 % — SIGNIFICANT CHANGE UP (ref 0–2)
BASOPHILS NFR BLD AUTO: 0.7 % — SIGNIFICANT CHANGE UP (ref 0–2)
EOSINOPHIL # BLD AUTO: 0.15 K/UL — SIGNIFICANT CHANGE UP (ref 0–0.5)
EOSINOPHIL # BLD AUTO: 0.15 K/UL — SIGNIFICANT CHANGE UP (ref 0–0.5)
EOSINOPHIL NFR BLD AUTO: 3.3 % — SIGNIFICANT CHANGE UP (ref 0–6)
EOSINOPHIL NFR BLD AUTO: 3.3 % — SIGNIFICANT CHANGE UP (ref 0–6)
HCT VFR BLD CALC: 36 % — SIGNIFICANT CHANGE UP (ref 34.5–45)
HCT VFR BLD CALC: 36 % — SIGNIFICANT CHANGE UP (ref 34.5–45)
HGB BLD-MCNC: 11.5 G/DL — SIGNIFICANT CHANGE UP (ref 11.5–15.5)
HGB BLD-MCNC: 11.5 G/DL — SIGNIFICANT CHANGE UP (ref 11.5–15.5)
IANC: 2.28 K/UL — SIGNIFICANT CHANGE UP (ref 1.8–7.4)
IANC: 2.28 K/UL — SIGNIFICANT CHANGE UP (ref 1.8–7.4)
IMM GRANULOCYTES NFR BLD AUTO: 0.2 % — SIGNIFICANT CHANGE UP (ref 0–0.9)
IMM GRANULOCYTES NFR BLD AUTO: 0.2 % — SIGNIFICANT CHANGE UP (ref 0–0.9)
LYMPHOCYTES # BLD AUTO: 1.54 K/UL — SIGNIFICANT CHANGE UP (ref 1–3.3)
LYMPHOCYTES # BLD AUTO: 1.54 K/UL — SIGNIFICANT CHANGE UP (ref 1–3.3)
LYMPHOCYTES # BLD AUTO: 34 % — SIGNIFICANT CHANGE UP (ref 13–44)
LYMPHOCYTES # BLD AUTO: 34 % — SIGNIFICANT CHANGE UP (ref 13–44)
MCHC RBC-ENTMCNC: 28.5 PG — SIGNIFICANT CHANGE UP (ref 27–34)
MCHC RBC-ENTMCNC: 28.5 PG — SIGNIFICANT CHANGE UP (ref 27–34)
MCHC RBC-ENTMCNC: 31.9 GM/DL — LOW (ref 32–36)
MCHC RBC-ENTMCNC: 31.9 GM/DL — LOW (ref 32–36)
MCV RBC AUTO: 89.1 FL — SIGNIFICANT CHANGE UP (ref 80–100)
MCV RBC AUTO: 89.1 FL — SIGNIFICANT CHANGE UP (ref 80–100)
MONOCYTES # BLD AUTO: 0.52 K/UL — SIGNIFICANT CHANGE UP (ref 0–0.9)
MONOCYTES # BLD AUTO: 0.52 K/UL — SIGNIFICANT CHANGE UP (ref 0–0.9)
MONOCYTES NFR BLD AUTO: 11.5 % — SIGNIFICANT CHANGE UP (ref 2–14)
MONOCYTES NFR BLD AUTO: 11.5 % — SIGNIFICANT CHANGE UP (ref 2–14)
NEUTROPHILS # BLD AUTO: 2.28 K/UL — SIGNIFICANT CHANGE UP (ref 1.8–7.4)
NEUTROPHILS # BLD AUTO: 2.28 K/UL — SIGNIFICANT CHANGE UP (ref 1.8–7.4)
NEUTROPHILS NFR BLD AUTO: 50.3 % — SIGNIFICANT CHANGE UP (ref 43–77)
NEUTROPHILS NFR BLD AUTO: 50.3 % — SIGNIFICANT CHANGE UP (ref 43–77)
NRBC # BLD: 0 /100 WBCS — SIGNIFICANT CHANGE UP (ref 0–0)
NRBC # BLD: 0 /100 WBCS — SIGNIFICANT CHANGE UP (ref 0–0)
NRBC # FLD: 0 K/UL — SIGNIFICANT CHANGE UP (ref 0–0)
NRBC # FLD: 0 K/UL — SIGNIFICANT CHANGE UP (ref 0–0)
PLATELET # BLD AUTO: 281 K/UL — SIGNIFICANT CHANGE UP (ref 150–400)
PLATELET # BLD AUTO: 281 K/UL — SIGNIFICANT CHANGE UP (ref 150–400)
RBC # BLD: 4.04 M/UL — SIGNIFICANT CHANGE UP (ref 3.8–5.2)
RBC # BLD: 4.04 M/UL — SIGNIFICANT CHANGE UP (ref 3.8–5.2)
RBC # FLD: 13.2 % — SIGNIFICANT CHANGE UP (ref 10.3–14.5)
RBC # FLD: 13.2 % — SIGNIFICANT CHANGE UP (ref 10.3–14.5)
WBC # BLD: 4.53 K/UL — SIGNIFICANT CHANGE UP (ref 3.8–10.5)
WBC # BLD: 4.53 K/UL — SIGNIFICANT CHANGE UP (ref 3.8–10.5)
WBC # FLD AUTO: 4.53 K/UL — SIGNIFICANT CHANGE UP (ref 3.8–10.5)
WBC # FLD AUTO: 4.53 K/UL — SIGNIFICANT CHANGE UP (ref 3.8–10.5)

## 2023-12-16 PROCEDURE — 99239 HOSP IP/OBS DSCHRG MGMT >30: CPT

## 2023-12-16 RX ORDER — CIPROFLOXACIN LACTATE 400MG/40ML
1 VIAL (ML) INTRAVENOUS
Qty: 14 | Refills: 0
Start: 2023-12-16 | End: 2023-12-22

## 2023-12-16 RX ADMIN — ERTAPENEM SODIUM 120 MILLIGRAM(S): 1 INJECTION, POWDER, LYOPHILIZED, FOR SOLUTION INTRAMUSCULAR; INTRAVENOUS at 05:43

## 2023-12-16 NOTE — PROGRESS NOTE ADULT - PROBLEM SELECTOR PLAN 2
DVT ppx: ambulate as tolerated  Diet: regular  Disposition: pending clinical improvement DVT ppx: ambulate as tolerated  Diet: regular  Disposition: dc to home with oral abx

## 2023-12-16 NOTE — PROGRESS NOTE ADULT - ATTENDING COMMENTS
34 y/o F with hx of UTI (ESBL) before in the past admitted for left pyelonephritis. flank pain and dysuria resolved. mild sweats this am but afebrile. labs improved. sensitivities pending    Exam  VSS  Gen: NAD  HEENT: NC/AT  CVS S1 S2  lungs CTA b/l  abd: +BS soft NT/ND  Ext: no edema b/l    #left pyelonephritis  -c/w ertapenum  -f/u urine cultures-sensitive to cipro. Dc on cipro to complete abx course  -educated patient regarding urinary hygiene    #fen/GI  -tolerating po    DC planning 32 mins.

## 2023-12-16 NOTE — DISCHARGE NOTE NURSING/CASE MANAGEMENT/SOCIAL WORK - PATIENT PORTAL LINK FT
You can access the FollowMyHealth Patient Portal offered by [facility] by registering at the following website: [email or web address]. By joining [employer]’s FollowMyHealth portal, you will also be able to view your health information using other applications (apps) compatible with our system. You can access the FollowMyHealth Patient Portal offered by [facility] by registering at the following website: [email or web address]. By joining [employer]’s FollowMyHealth portal, you will also be able to view your health information using other applications (apps) compatible with our system.

## 2023-12-16 NOTE — DISCHARGE NOTE NURSING/CASE MANAGEMENT/SOCIAL WORK - NSDCPEFALRISK_GEN_ALL_CORE
For information on Fall & Injury Prevention, visit: [email or web address] OR  [email or web address] OR  https://www.cdc.gov/steadi/patient.html For information on Fall & Injury Prevention, visit: [email or web address] OR  [email or web address] OR  https://www.cdc.gov/steadi/patient.html

## 2023-12-16 NOTE — PROGRESS NOTE ADULT - SUBJECTIVE AND OBJECTIVE BOX
INTERVAL: No acute overnight events.   SUBJECTIVE: Patient examined bedside this AM.    OBJECTIVE:  Vital Signs Last 24 Hrs  T(C): 36.8 (15 Dec 2023 21:55), Max: 36.8 (15 Dec 2023 21:55)  T(F): 98.3 (15 Dec 2023 21:55), Max: 98.3 (15 Dec 2023 21:55)  HR: 87 (15 Dec 2023 21:55) (87 - 91)  BP: 103/67 (15 Dec 2023 22:05) (95/54 - 103/67)  RR: 18 (15 Dec 2023 21:55) (18 - 18)  SpO2: 100% (15 Dec 2023 21:55) (100% - 100%)    O2 Parameters below as of 15 Dec 2023 21:55  Patient On (Oxygen Delivery Method): room air    PHYSICAL EXAM:  General: NAD, laying in bed  HEENT: PERRLA, EOMI, sclera non-icteric  Neck: JVD absent  Respiratory: Clear to ascultation bilaterally, no crackles/rales, no accessory muscle use  Cardiovascular: RRR, no murmurs/rubs/gallops  Abdomen: Soft, NT, ND  Extremities: No LE edema  Skin: No rashes or lesions   Neurological: Sensation grossly intact, strength 5/5 in all extremities  Psychiatry: AOx3, appropriate insight/judgement, appropriate affect, recent/remote memory intact    PRN Meds:  acetaminophen     Tablet .. 650 milliGRAM(s) Oral every 6 hours PRN  melatonin 3 milliGRAM(s) Oral at bedtime PRN  ondansetron   Disintegrating Tablet 4 milliGRAM(s) Oral every 8 hours PRN    LABS:                        11.5   5.67  )-----------( 247      ( 15 Dec 2023 05:15 )             36.0     Hgb Trend: 11.5<--, 11.2<--, 10.8<--, 12.9<--  12-15    138  |  104  |  11  ----------------------------<  98  4.1   |  24  |  0.66    Ca    8.7      15 Dec 2023 05:15  Phos  3.6     12-15  Mg     2.10     12-15    Creatinine Trend: 0.66<--, 0.57<--, 0.56<--, 0.75<--    Urinalysis Basic - ( 15 Dec 2023 05:15 )  Color: x / Appearance: x / SG: x / pH: x  Gluc: 98 mg/dL / Ketone: x  / Bili: x / Urobili: x   Blood: x / Protein: x / Nitrite: x   Leuk Esterase: x / RBC: x / WBC x   Sq Epi: x / Non Sq Epi: x / Bacteria: x INTERVAL: No acute overnight events.   SUBJECTIVE: Patient examined bedside this AM. Feeling well, no current concerns. Flank pain has resolved.     OBJECTIVE:  Vital Signs Last 24 Hrs  T(C): 36.8 (15 Dec 2023 21:55), Max: 36.8 (15 Dec 2023 21:55)  T(F): 98.3 (15 Dec 2023 21:55), Max: 98.3 (15 Dec 2023 21:55)  HR: 87 (15 Dec 2023 21:55) (87 - 91)  BP: 103/67 (15 Dec 2023 22:05) (95/54 - 103/67)  RR: 18 (15 Dec 2023 21:55) (18 - 18)  SpO2: 100% (15 Dec 2023 21:55) (100% - 100%)    O2 Parameters below as of 15 Dec 2023 21:55  Patient On (Oxygen Delivery Method): room air    PHYSICAL EXAM:  General: NAD, laying in bed  HEENT: PERRLA, EOMI, sclera non-icteric  Neck: JVD absent  Respiratory: Clear to ascultation bilaterally, no crackles/rales, no accessory muscle use  Cardiovascular: RRR, no murmurs/rubs/gallops  Abdomen: Soft, NT, ND  Extremities: No LE edema  Skin: No rashes or lesions   Neurological: Sensation grossly intact, strength 5/5 in all extremities  Psychiatry: AOx3, appropriate insight/judgement, appropriate affect, recent/remote memory intact    PRN Meds:  acetaminophen     Tablet .. 650 milliGRAM(s) Oral every 6 hours PRN  melatonin 3 milliGRAM(s) Oral at bedtime PRN  ondansetron   Disintegrating Tablet 4 milliGRAM(s) Oral every 8 hours PRN    LABS:                        11.5   5.67  )-----------( 247      ( 15 Dec 2023 05:15 )             36.0     Hgb Trend: 11.5<--, 11.2<--, 10.8<--, 12.9<--  12-15    138  |  104  |  11  ----------------------------<  98  4.1   |  24  |  0.66    Ca    8.7      15 Dec 2023 05:15  Phos  3.6     12-15  Mg     2.10     12-15    Creatinine Trend: 0.66<--, 0.57<--, 0.56<--, 0.75<--    Urinalysis Basic - ( 15 Dec 2023 05:15 )  Color: x / Appearance: x / SG: x / pH: x  Gluc: 98 mg/dL / Ketone: x  / Bili: x / Urobili: x   Blood: x / Protein: x / Nitrite: x   Leuk Esterase: x / RBC: x / WBC x   Sq Epi: x / Non Sq Epi: x / Bacteria: x

## 2023-12-16 NOTE — PROGRESS NOTE ADULT - PROBLEM SELECTOR PLAN 1
CT suggestive of L pyelonephritis and positive UA  CVA tenderness, fever, dysuria  Initially tachycardic when presented to ED  Of note, history of ESBL E. coli on urine culture    Plan:  -Continue ertapenem 12/12-  -UCx with ecoli, f/u sensitivities  -Tylenol PRN fever  -CTM vitals, WBCs CT suggestive of L pyelonephritis and positive UA  CVA tenderness, fever, dysuria  Initially tachycardic when presented to ED  Of note, history of ESBL E. coli on urine culture    Plan:  -Continue ertapenem 12/12-12/16  -UCx with ecoli sensitive to cipro; transition to PO cipro for DC  -Tylenol PRN fever  -CTM vitals, WBCs

## 2023-12-17 LAB
CULTURE RESULTS: SIGNIFICANT CHANGE UP
SPECIMEN SOURCE: SIGNIFICANT CHANGE UP

## 2024-01-24 ENCOUNTER — INPATIENT (INPATIENT)
Facility: HOSPITAL | Age: 34
LOS: 4 days | Discharge: HOME | End: 2024-01-29
Attending: STUDENT IN AN ORGANIZED HEALTH CARE EDUCATION/TRAINING PROGRAM | Admitting: STUDENT IN AN ORGANIZED HEALTH CARE EDUCATION/TRAINING PROGRAM
Payer: COMMERCIAL

## 2024-01-24 VITALS
HEART RATE: 122 BPM | DIASTOLIC BLOOD PRESSURE: 68 MMHG | HEIGHT: 63 IN | OXYGEN SATURATION: 96 % | RESPIRATION RATE: 18 BRPM | TEMPERATURE: 99 F | SYSTOLIC BLOOD PRESSURE: 103 MMHG

## 2024-01-24 DIAGNOSIS — N12 TUBULO-INTERSTITIAL NEPHRITIS, NOT SPECIFIED AS ACUTE OR CHRONIC: ICD-10-CM

## 2024-01-24 DIAGNOSIS — N39.0 URINARY TRACT INFECTION, SITE NOT SPECIFIED: ICD-10-CM

## 2024-01-24 DIAGNOSIS — A41.9 SEPSIS, UNSPECIFIED ORGANISM: ICD-10-CM

## 2024-01-24 DIAGNOSIS — Z29.9 ENCOUNTER FOR PROPHYLACTIC MEASURES, UNSPECIFIED: ICD-10-CM

## 2024-01-24 DIAGNOSIS — Z98.891 HISTORY OF UTERINE SCAR FROM PREVIOUS SURGERY: Chronic | ICD-10-CM

## 2024-01-24 LAB
ALBUMIN SERPL ELPH-MCNC: 4.1 G/DL — SIGNIFICANT CHANGE UP (ref 3.3–5)
ALP SERPL-CCNC: 67 U/L — SIGNIFICANT CHANGE UP (ref 40–120)
ALT FLD-CCNC: 29 U/L — SIGNIFICANT CHANGE UP (ref 4–33)
ANION GAP SERPL CALC-SCNC: 12 MMOL/L — SIGNIFICANT CHANGE UP (ref 7–14)
APPEARANCE UR: ABNORMAL
AST SERPL-CCNC: 28 U/L — SIGNIFICANT CHANGE UP (ref 4–32)
BACTERIA # UR AUTO: ABNORMAL /HPF
BASE EXCESS BLDV CALC-SCNC: -1.9 MMOL/L — SIGNIFICANT CHANGE UP (ref -2–3)
BASOPHILS # BLD AUTO: 0.02 K/UL — SIGNIFICANT CHANGE UP (ref 0–0.2)
BASOPHILS NFR BLD AUTO: 0.1 % — SIGNIFICANT CHANGE UP (ref 0–2)
BILIRUB SERPL-MCNC: 0.3 MG/DL — SIGNIFICANT CHANGE UP (ref 0.2–1.2)
BILIRUB UR-MCNC: NEGATIVE — SIGNIFICANT CHANGE UP
BLOOD GAS VENOUS COMPREHENSIVE RESULT: SIGNIFICANT CHANGE UP
BUN SERPL-MCNC: 6 MG/DL — LOW (ref 7–23)
CALCIUM SERPL-MCNC: 8.9 MG/DL — SIGNIFICANT CHANGE UP (ref 8.4–10.5)
CAST: 0 /LPF — SIGNIFICANT CHANGE UP (ref 0–4)
CHLORIDE BLDV-SCNC: 102 MMOL/L — SIGNIFICANT CHANGE UP (ref 96–108)
CHLORIDE SERPL-SCNC: 101 MMOL/L — SIGNIFICANT CHANGE UP (ref 98–107)
CO2 BLDV-SCNC: 25.7 MMOL/L — SIGNIFICANT CHANGE UP (ref 22–26)
CO2 SERPL-SCNC: 23 MMOL/L — SIGNIFICANT CHANGE UP (ref 22–31)
COLOR SPEC: YELLOW — SIGNIFICANT CHANGE UP
CREAT SERPL-MCNC: 0.74 MG/DL — SIGNIFICANT CHANGE UP (ref 0.5–1.3)
DIFF PNL FLD: ABNORMAL
EGFR: 109 ML/MIN/1.73M2 — SIGNIFICANT CHANGE UP
EOSINOPHIL # BLD AUTO: 0 K/UL — SIGNIFICANT CHANGE UP (ref 0–0.5)
EOSINOPHIL NFR BLD AUTO: 0 % — SIGNIFICANT CHANGE UP (ref 0–6)
GAS PNL BLDV: 135 MMOL/L — LOW (ref 136–145)
GLUCOSE BLDV-MCNC: 90 MG/DL — SIGNIFICANT CHANGE UP (ref 70–99)
GLUCOSE SERPL-MCNC: 90 MG/DL — SIGNIFICANT CHANGE UP (ref 70–99)
GLUCOSE UR QL: 250 MG/DL
HCO3 BLDV-SCNC: 24 MMOL/L — SIGNIFICANT CHANGE UP (ref 22–29)
HCT VFR BLD CALC: 37.4 % — SIGNIFICANT CHANGE UP (ref 34.5–45)
HCT VFR BLDA CALC: 39 % — SIGNIFICANT CHANGE UP (ref 34.5–46.5)
HGB BLD CALC-MCNC: 12.9 G/DL — SIGNIFICANT CHANGE UP (ref 11.7–16.1)
HGB BLD-MCNC: 12.3 G/DL — SIGNIFICANT CHANGE UP (ref 11.5–15.5)
IANC: 10.16 K/UL — HIGH (ref 1.8–7.4)
IMM GRANULOCYTES NFR BLD AUTO: 0.6 % — SIGNIFICANT CHANGE UP (ref 0–0.9)
KETONES UR-MCNC: NEGATIVE MG/DL — SIGNIFICANT CHANGE UP
LACTATE BLDV-MCNC: 2.2 MMOL/L — HIGH (ref 0.5–2)
LACTATE SERPL-SCNC: 1 MMOL/L — SIGNIFICANT CHANGE UP (ref 0.5–2)
LEUKOCYTE ESTERASE UR-ACNC: ABNORMAL
LYMPHOCYTES # BLD AUTO: 1.43 K/UL — SIGNIFICANT CHANGE UP (ref 1–3.3)
LYMPHOCYTES # BLD AUTO: 10.6 % — LOW (ref 13–44)
MCHC RBC-ENTMCNC: 28.7 PG — SIGNIFICANT CHANGE UP (ref 27–34)
MCHC RBC-ENTMCNC: 32.9 GM/DL — SIGNIFICANT CHANGE UP (ref 32–36)
MCV RBC AUTO: 87.4 FL — SIGNIFICANT CHANGE UP (ref 80–100)
MONOCYTES # BLD AUTO: 1.84 K/UL — HIGH (ref 0–0.9)
MONOCYTES NFR BLD AUTO: 13.6 % — SIGNIFICANT CHANGE UP (ref 2–14)
NEUTROPHILS # BLD AUTO: 10.16 K/UL — HIGH (ref 1.8–7.4)
NEUTROPHILS NFR BLD AUTO: 75.1 % — SIGNIFICANT CHANGE UP (ref 43–77)
NITRITE UR-MCNC: POSITIVE
NRBC # BLD: 0 /100 WBCS — SIGNIFICANT CHANGE UP (ref 0–0)
NRBC # FLD: 0 K/UL — SIGNIFICANT CHANGE UP (ref 0–0)
PCO2 BLDV: 46 MMHG — SIGNIFICANT CHANGE UP (ref 39–52)
PH BLDV: 7.33 — SIGNIFICANT CHANGE UP (ref 7.32–7.43)
PH UR: 6 — SIGNIFICANT CHANGE UP (ref 5–8)
PLATELET # BLD AUTO: 209 K/UL — SIGNIFICANT CHANGE UP (ref 150–400)
PO2 BLDV: 42 MMHG — SIGNIFICANT CHANGE UP (ref 25–45)
POTASSIUM BLDV-SCNC: 3.2 MMOL/L — LOW (ref 3.5–5.1)
POTASSIUM SERPL-MCNC: 3.4 MMOL/L — LOW (ref 3.5–5.3)
POTASSIUM SERPL-SCNC: 3.4 MMOL/L — LOW (ref 3.5–5.3)
PROT SERPL-MCNC: 7.7 G/DL — SIGNIFICANT CHANGE UP (ref 6–8.3)
PROT UR-MCNC: 30 MG/DL
RBC # BLD: 4.28 M/UL — SIGNIFICANT CHANGE UP (ref 3.8–5.2)
RBC # FLD: 13.6 % — SIGNIFICANT CHANGE UP (ref 10.3–14.5)
RBC CASTS # UR COMP ASSIST: 8 /HPF — HIGH (ref 0–4)
SAO2 % BLDV: 67 % — SIGNIFICANT CHANGE UP (ref 67–88)
SODIUM SERPL-SCNC: 136 MMOL/L — SIGNIFICANT CHANGE UP (ref 135–145)
SP GR SPEC: 1.02 — SIGNIFICANT CHANGE UP (ref 1–1.03)
SQUAMOUS # UR AUTO: 8 /HPF — HIGH (ref 0–5)
UROBILINOGEN FLD QL: 0.2 MG/DL — SIGNIFICANT CHANGE UP (ref 0.2–1)
WBC # BLD: 13.53 K/UL — HIGH (ref 3.8–10.5)
WBC # FLD AUTO: 13.53 K/UL — HIGH (ref 3.8–10.5)
WBC UR QL: 17 /HPF — HIGH (ref 0–5)

## 2024-01-24 PROCEDURE — 99285 EMERGENCY DEPT VISIT HI MDM: CPT

## 2024-01-24 PROCEDURE — 99223 1ST HOSP IP/OBS HIGH 75: CPT | Mod: GC

## 2024-01-24 RX ORDER — KETOROLAC TROMETHAMINE 30 MG/ML
10 SYRINGE (ML) INJECTION ONCE
Refills: 0 | Status: DISCONTINUED | OUTPATIENT
Start: 2024-01-24 | End: 2024-01-24

## 2024-01-24 RX ORDER — OMEGA-3 ACID ETHYL ESTERS 1 G
4 CAPSULE ORAL DAILY
Refills: 0 | Status: DISCONTINUED | OUTPATIENT
Start: 2024-01-24 | End: 2024-01-29

## 2024-01-24 RX ORDER — ACETAMINOPHEN 500 MG
1000 TABLET ORAL EVERY 8 HOURS
Refills: 0 | Status: DISCONTINUED | OUTPATIENT
Start: 2024-01-25 | End: 2024-01-29

## 2024-01-24 RX ORDER — CEFTRIAXONE 500 MG/1
1000 INJECTION, POWDER, FOR SOLUTION INTRAMUSCULAR; INTRAVENOUS EVERY 24 HOURS
Refills: 0 | Status: DISCONTINUED | OUTPATIENT
Start: 2024-01-25 | End: 2024-01-27

## 2024-01-24 RX ORDER — ONDANSETRON 8 MG/1
4 TABLET, FILM COATED ORAL ONCE
Refills: 0 | Status: COMPLETED | OUTPATIENT
Start: 2024-01-24 | End: 2024-01-24

## 2024-01-24 RX ORDER — CEFTRIAXONE 500 MG/1
1000 INJECTION, POWDER, FOR SOLUTION INTRAMUSCULAR; INTRAVENOUS ONCE
Refills: 0 | Status: COMPLETED | OUTPATIENT
Start: 2024-01-24 | End: 2024-01-24

## 2024-01-24 RX ORDER — SODIUM CHLORIDE 9 MG/ML
1000 INJECTION INTRAMUSCULAR; INTRAVENOUS; SUBCUTANEOUS ONCE
Refills: 0 | Status: COMPLETED | OUTPATIENT
Start: 2024-01-24 | End: 2024-01-24

## 2024-01-24 RX ORDER — POTASSIUM CHLORIDE 20 MEQ
20 PACKET (EA) ORAL ONCE
Refills: 0 | Status: COMPLETED | OUTPATIENT
Start: 2024-01-24 | End: 2024-01-24

## 2024-01-24 RX ORDER — ACETAMINOPHEN 500 MG
1000 TABLET ORAL ONCE
Refills: 0 | Status: COMPLETED | OUTPATIENT
Start: 2024-01-24 | End: 2024-01-24

## 2024-01-24 RX ORDER — OMEGA-3 ACID ETHYL ESTERS 1 G
1 CAPSULE ORAL
Refills: 0 | DISCHARGE

## 2024-01-24 RX ADMIN — CEFTRIAXONE 100 MILLIGRAM(S): 500 INJECTION, POWDER, FOR SOLUTION INTRAMUSCULAR; INTRAVENOUS at 13:27

## 2024-01-24 RX ADMIN — SODIUM CHLORIDE 1000 MILLILITER(S): 9 INJECTION INTRAMUSCULAR; INTRAVENOUS; SUBCUTANEOUS at 10:30

## 2024-01-24 RX ADMIN — ONDANSETRON 4 MILLIGRAM(S): 8 TABLET, FILM COATED ORAL at 10:30

## 2024-01-24 RX ADMIN — Medication 400 MILLIGRAM(S): at 17:10

## 2024-01-24 RX ADMIN — Medication 10 MILLIGRAM(S): at 23:29

## 2024-01-24 RX ADMIN — Medication 20 MILLIEQUIVALENT(S): at 17:11

## 2024-01-24 RX ADMIN — SODIUM CHLORIDE 1000 MILLILITER(S): 9 INJECTION INTRAMUSCULAR; INTRAVENOUS; SUBCUTANEOUS at 13:27

## 2024-01-24 NOTE — H&P ADULT - ATTENDING COMMENTS
Pt. seen and examined on 1/24/24.    32 y/o F with PMH UTI/pyelo who presents for flank pain, fatigue found to have UTI/pyelo.     #UTI/pyelo, recently treated in December 2023 for pyelo. UCx with pan sensitive E.coli. UA now positive again. Left sided CVA tenderness on exam.  BCx sent. UCx pending. C/w ceftriaxone for now. Will hold off on imaging. Kidney function is at baseline.     Remainder of plan as stated above. Plan discussed with HS.

## 2024-01-24 NOTE — ED PROVIDER NOTE - CLINICAL SUMMARY MEDICAL DECISION MAKING FREE TEXT BOX
32 y/o female h/o L pyelo requiring admission in December here with likely R pyelo.  Obtain cbc cmp ua ucx ucg give ivf bolus antiemetic, declines pain meds at this time.

## 2024-01-24 NOTE — H&P ADULT - TIME BILLING
Review of laboratory data, radiology results, consultants' recommendations, documentation in [place], discussion with patient/advanced care providers and interdisciplinary staff (such as , social workers, etc). Interventions were performed as documented above.

## 2024-01-24 NOTE — H&P ADULT - ASSESSMENT
33 F w/ PMHx of UTI (ESBL E. Coli in 2019, pansensitive E. Coli in Dec 23') p/w severe sepsis 2/2 UTI

## 2024-01-24 NOTE — H&P ADULT - PROBLEM SELECTOR PLAN 3
- DVT: Not indicated in this patient (Improv VTE and [place] scores low)  - Diet: Regular  - Dispo: Home

## 2024-01-24 NOTE — PATIENT PROFILE ADULT - FALL HARM RISK - RISK INTERVENTIONS

## 2024-01-24 NOTE — H&P ADULT - HISTORY OF PRESENT ILLNESS
33 F w/ PMHx of UTI (ESBL E. Coli in 2019, pansensitive E. Coli in Dec 23') p/w 2 days of R flank pain, dysuria, fevers, cloudy/foul smelling urine. She that it feels like UTIs she has had in the past. She also has nausea today but no vomiting. Patient states that on 1/21 she began to feel unwell, and on 1/22 she began to have fever, body ache, R flank pain, and noted that her urine was cloudy. Symptoms continued to progress and patient reports fevers at home up to 103. Due to worsenin condition and prior experience with UTIs, patient decided to come to the ED. She denies any headache, dizziness, abdominal pain, diarrhea, or rashes. Currently endorses R flank pain, dysuria, R knee pain persistent since November). She does note that R flank pain has been present since last admission in December, and is unchanged.    ED course: Patient arrived to ED with , /63, Oral temp 99F, saturating well on RA. Pt received 1L IVF bolus x2, Zofran, CTX. UA was grossly positive. Blood work showed Leukocytosis of 13.53 and Lactate 2.2.

## 2024-01-24 NOTE — ED PROVIDER NOTE - CONSTITUTIONAL MANNER
Bedside and Verbal shift change report given to Alexis Gomez RN (oncoming nurse) by Stacey Arias RN (offgoing nurse). Report included the following information SBAR, Kardex, ED Summary, Intake/Output, MAR, Recent Results and Med Rec Status. appropriate for situation

## 2024-01-24 NOTE — ED PROVIDER NOTE - NS ED MD DISPO DIVISION
You have been seen for a left hand laceration. Please apply local wound care as discussed. Arrange for suture removal in 7-10 days. Return to the emergency department if you develop worsening pain, weakness/numbness, signs of infection or any other symptoms of concern. Please follow up with your PCP by calling the number provided. Your blood pressure is elevated on your visit today. When left untreated, the damage that high blood pressure does to your circulatory system is a significant contributing factor to heart attack, stroke, chronic kidney disease and other health threats. Please arrange for a blood pressure recheck with a PCP within the next week for further evaluation. ZHAO

## 2024-01-24 NOTE — H&P ADULT - NSHPPHYSICALEXAM_GEN_ALL_CORE
PHYSICAL EXAM:  GENERAL: NAD, lying in bed comfortably  HEENT: NC/AT  NECK: Supple, No JVD  CHEST/LUNG: CTAB, no increased WOB  HEART: RRR, no m/r/g, +2 pulses bilaterally  ABDOMEN: soft, non-tender, non-distended, BS+  EXTREMITIES:  2+ peripheral pulses, no clubbing, no edema  MSK: b/l flank CVA tenderness  NERVOUS SYSTEM:  A&Ox3  SKIN: No rashes or lesions    Vital Signs Last 24 Hrs  T(C): 37.1 (24 Jan 2024 13:33), Max: 37.2 (24 Jan 2024 09:02)  T(F): 98.7 (24 Jan 2024 13:33), Max: 99 (24 Jan 2024 09:02)  HR: 104 (24 Jan 2024 14:09) (98 - 122)  BP: 103/66 (24 Jan 2024 14:09) (103/66 - 115/68)  BP(mean): --  RR: 18 (24 Jan 2024 14:09) (18 - 18)  SpO2: 100% (24 Jan 2024 14:09) (96% - 100%)    Parameters below as of 24 Jan 2024 14:09  Patient On (Oxygen Delivery Method): room air

## 2024-01-24 NOTE — ED ADULT TRIAGE NOTE - CHIEF COMPLAINT QUOTE
Patient c/o R flank pain and nausea x 2 days, reports fever and chills yesterday. Reports cloudy urine. Denies dysuria, hematuria, abdominal pain, vomiting. Denies phx

## 2024-01-24 NOTE — ED ADULT NURSE NOTE - OBJECTIVE STATEMENT
Pt received to wellness, awake and alert, A&OX4, ambulatory. C/o R flank pain and R lower back pain associated with dysuria x2 days. Hx pyelo and states this feels similar.  Respirations even and unlabored. Denies CP, SOB, N/V, HA, dizziness, palpitations, blurry vision. 20G IV placed to  L AC. Bed in lowest position, call bell within reach. Safety maintained.

## 2024-01-24 NOTE — ED PROVIDER NOTE - OBJECTIVE STATEMENT
34 y/o female with h/o pyelo esbl ecoli here with c/o r flank pain nausea dysuria f/c x2 days.  Feels similar to pyelo in the past.

## 2024-01-24 NOTE — H&P ADULT - NSHPREVIEWOFSYSTEMS_GEN_ALL_CORE
REVIEW OF SYSTEMS:    CONSTITUTIONAL: Fatigue  EYES/ENT: No visual changes;  No vertigo or throat pain   NECK: No pain or stiffness  RESPIRATORY: No cough, wheezing, hemoptysis; No shortness of breath  CARDIOVASCULAR: No chest pain or palpitations  GASTROINTESTINAL: No abdominal or epigastric pain. No nausea, vomiting, or hematemesis; No diarrhea or constipation. No melena or hematochezia.  GENITOURINARY: Dysuria  NEUROLOGICAL: No numbness or weakness  SKIN: No itching, rashes

## 2024-01-24 NOTE — H&P ADULT - NSHPLABSRESULTS_GEN_ALL_CORE
LABS:  cret                        12.3   13.53 )-----------( 209      ( 24 Jan 2024 11:00 )             37.4     01-24    136  |  101  |  6<L>  ----------------------------<  90  3.4<L>   |  23  |  0.74    Ca    8.9      24 Jan 2024 11:00    TPro  7.7  /  Alb  4.1  /  TBili  0.3  /  DBili  x   /  AST  28  /  ALT  29  /  AlkPhos  67  01-24

## 2024-01-24 NOTE — ED ADULT NURSE NOTE - NSFALLUNIVINTERV_ED_ALL_ED
Bed/Stretcher in lowest position, wheels locked, appropriate side rails in place/Call bell, personal items and telephone in reach/Instruct patient to call for assistance before getting out of bed/chair/stretcher/Non-slip footwear applied when patient is off stretcher/[facility] to call system/Physically safe environment - no spills, clutter or unnecessary equipment/Purposeful proactive rounding/Room/bathroom lighting operational, light cord in reach

## 2024-01-24 NOTE — H&P ADULT - PROBLEM SELECTOR PLAN 1
- Patient arrived to ED with , /63, Oral temp 99F  - s/p 1L IVF bolus x2, Zofran, CTX in ED  - Leukocytosis of 13.53 and Lactate 2.2  - UA grossly positive    Plan:  - c/w CTX  - f/u UCx, BCx  - f/u repeat Lactate

## 2024-01-25 LAB
ALBUMIN SERPL ELPH-MCNC: 3.5 G/DL — SIGNIFICANT CHANGE UP (ref 3.3–5)
ALP SERPL-CCNC: 62 U/L — SIGNIFICANT CHANGE UP (ref 40–120)
ALT FLD-CCNC: 25 U/L — SIGNIFICANT CHANGE UP (ref 4–33)
ANION GAP SERPL CALC-SCNC: 11 MMOL/L — SIGNIFICANT CHANGE UP (ref 7–14)
AST SERPL-CCNC: 24 U/L — SIGNIFICANT CHANGE UP (ref 4–32)
B PERT DNA SPEC QL NAA+PROBE: SIGNIFICANT CHANGE UP
B PERT+PARAPERT DNA PNL SPEC NAA+PROBE: SIGNIFICANT CHANGE UP
BILIRUB SERPL-MCNC: 0.4 MG/DL — SIGNIFICANT CHANGE UP (ref 0.2–1.2)
BORDETELLA PARAPERTUSSIS (RAPRVP): SIGNIFICANT CHANGE UP
BUN SERPL-MCNC: 8 MG/DL — SIGNIFICANT CHANGE UP (ref 7–23)
C PNEUM DNA SPEC QL NAA+PROBE: SIGNIFICANT CHANGE UP
CALCIUM SERPL-MCNC: 8.1 MG/DL — LOW (ref 8.4–10.5)
CHLORIDE SERPL-SCNC: 105 MMOL/L — SIGNIFICANT CHANGE UP (ref 98–107)
CO2 SERPL-SCNC: 20 MMOL/L — LOW (ref 22–31)
CREAT SERPL-MCNC: 0.65 MG/DL — SIGNIFICANT CHANGE UP (ref 0.5–1.3)
EGFR: 118 ML/MIN/1.73M2 — SIGNIFICANT CHANGE UP
FLUAV SUBTYP SPEC NAA+PROBE: SIGNIFICANT CHANGE UP
FLUBV RNA SPEC QL NAA+PROBE: SIGNIFICANT CHANGE UP
GLUCOSE SERPL-MCNC: 91 MG/DL — SIGNIFICANT CHANGE UP (ref 70–99)
HADV DNA SPEC QL NAA+PROBE: SIGNIFICANT CHANGE UP
HCOV 229E RNA SPEC QL NAA+PROBE: SIGNIFICANT CHANGE UP
HCOV HKU1 RNA SPEC QL NAA+PROBE: SIGNIFICANT CHANGE UP
HCOV NL63 RNA SPEC QL NAA+PROBE: SIGNIFICANT CHANGE UP
HCOV OC43 RNA SPEC QL NAA+PROBE: SIGNIFICANT CHANGE UP
HMPV RNA SPEC QL NAA+PROBE: SIGNIFICANT CHANGE UP
HPIV1 RNA SPEC QL NAA+PROBE: SIGNIFICANT CHANGE UP
HPIV2 RNA SPEC QL NAA+PROBE: SIGNIFICANT CHANGE UP
HPIV3 RNA SPEC QL NAA+PROBE: SIGNIFICANT CHANGE UP
HPIV4 RNA SPEC QL NAA+PROBE: SIGNIFICANT CHANGE UP
M PNEUMO DNA SPEC QL NAA+PROBE: SIGNIFICANT CHANGE UP
MAGNESIUM SERPL-MCNC: 1.7 MG/DL — SIGNIFICANT CHANGE UP (ref 1.6–2.6)
PHOSPHATE SERPL-MCNC: 1.9 MG/DL — LOW (ref 2.5–4.5)
POTASSIUM SERPL-MCNC: 4.1 MMOL/L — SIGNIFICANT CHANGE UP (ref 3.5–5.3)
POTASSIUM SERPL-SCNC: 4.1 MMOL/L — SIGNIFICANT CHANGE UP (ref 3.5–5.3)
PROT SERPL-MCNC: 6.7 G/DL — SIGNIFICANT CHANGE UP (ref 6–8.3)
RAPID RVP RESULT: SIGNIFICANT CHANGE UP
RSV RNA SPEC QL NAA+PROBE: SIGNIFICANT CHANGE UP
RV+EV RNA SPEC QL NAA+PROBE: SIGNIFICANT CHANGE UP
SARS-COV-2 RNA SPEC QL NAA+PROBE: SIGNIFICANT CHANGE UP
SODIUM SERPL-SCNC: 136 MMOL/L — SIGNIFICANT CHANGE UP (ref 135–145)

## 2024-01-25 PROCEDURE — 99232 SBSQ HOSP IP/OBS MODERATE 35: CPT | Mod: GC

## 2024-01-25 RX ORDER — SODIUM CHLORIDE 9 MG/ML
1000 INJECTION, SOLUTION INTRAVENOUS
Refills: 0 | Status: DISCONTINUED | OUTPATIENT
Start: 2024-01-25 | End: 2024-01-26

## 2024-01-25 RX ORDER — SODIUM,POTASSIUM PHOSPHATES 278-250MG
1 POWDER IN PACKET (EA) ORAL ONCE
Refills: 0 | Status: COMPLETED | OUTPATIENT
Start: 2024-01-25 | End: 2024-01-25

## 2024-01-25 RX ORDER — SODIUM CHLORIDE 9 MG/ML
1000 INJECTION, SOLUTION INTRAVENOUS ONCE
Refills: 0 | Status: COMPLETED | OUTPATIENT
Start: 2024-01-25 | End: 2024-01-25

## 2024-01-25 RX ORDER — ACETAMINOPHEN 500 MG
650 TABLET ORAL ONCE
Refills: 0 | Status: COMPLETED | OUTPATIENT
Start: 2024-01-25 | End: 2024-01-25

## 2024-01-25 RX ADMIN — Medication 650 MILLIGRAM(S): at 21:30

## 2024-01-25 RX ADMIN — Medication 650 MILLIGRAM(S): at 20:30

## 2024-01-25 RX ADMIN — Medication 4 GRAM(S): at 13:10

## 2024-01-25 RX ADMIN — SODIUM CHLORIDE 100 MILLILITER(S): 9 INJECTION, SOLUTION INTRAVENOUS at 10:27

## 2024-01-25 RX ADMIN — SODIUM CHLORIDE 1000 MILLILITER(S): 9 INJECTION, SOLUTION INTRAVENOUS at 10:26

## 2024-01-25 RX ADMIN — Medication 10 MILLIGRAM(S): at 00:29

## 2024-01-25 RX ADMIN — Medication 1 PACKET(S): at 13:11

## 2024-01-25 RX ADMIN — CEFTRIAXONE 100 MILLIGRAM(S): 500 INJECTION, POWDER, FOR SOLUTION INTRAMUSCULAR; INTRAVENOUS at 13:09

## 2024-01-25 NOTE — PROGRESS NOTE ADULT - TIME BILLING
Review of laboratory data, radiology results, consultants' recommendations, documentation in [place], discussion with patient/advanced care providers and interdisciplinary staff (such as , social workers, etc). Interventions were performed as documented above.

## 2024-01-25 NOTE — PROGRESS NOTE ADULT - PROBLEM SELECTOR PLAN 1
- Patient arrived to ED with , /63, Oral temp 99F  - s/p 1L IVF bolus x2, Zofran, CTX in ED  - Leukocytosis of 13.53 and Lactate 2.2  - UA grossly positive    Plan:  - c/w CTX  - f/u UCx, BCx  - repeat Lactate 1.0

## 2024-01-25 NOTE — PROGRESS NOTE ADULT - SUBJECTIVE AND OBJECTIVE BOX
************************  Enrrique Pisano MD  Internal Medicine PGY-1  ************************    Patient is a 33y old  Female who presents with a chief complaint of Severe Sepsis 2/2 UTI (2024 15:00)    Overnight no events, this morning patient with no acute complaints. Patient urinating well with BM(s). Denies CP, SOB, fevers/chills, N/V, or abdominal pain. Patient reminded of ongoing careplan.    MEDICATIONS  (STANDING):  cefTRIAXone   IVPB 1000 milliGRAM(s) IV Intermittent every 24 hours  omega-3-Acid Ethyl Esters 4 Gram(s) Oral daily    MEDICATIONS  (PRN):  acetaminophen   IVPB .. 1000 milliGRAM(s) IV Intermittent every 8 hours PRN Temp greater or equal to 38C (100.4F)      CAPILLARY BLOOD GLUCOSE        I&O's Summary      PHYSICAL EXAM:  Vital Signs Last 24 Hrs  T(C): 37.4 (2024 06:16), Max: 39.2 (2024 16:40)  T(F): 99.3 (2024 06:16), Max: 102.5 (2024 16:40)  HR: 108 (2024 06:16) (98 - 122)  BP: 102/68 (2024 06:16) (102/66 - 115/68)  BP(mean): --  RR: 17 (2024 06:16) (17 - 18)  SpO2: 99% (2024 06:16) (96% - 100%)    Parameters below as of 2024 06:16  Patient On (Oxygen Delivery Method): room air        PHYSICAL EXAM:  GENERAL: NAD, lying in bed comfortably  HEENT: NC/AT  NECK: Supple, No JVD  CHEST/LUNG: CTAB, no increased WOB  HEART: RRR, no m/r/g, +2 pulses bilaterally  ABDOMEN: soft, non-tender, non-distended, BS+  EXTREMITIES:  2+ peripheral pulses, no clubbing, no edema  MSK: mild b/l flank CVA tenderness  NERVOUS SYSTEM:  A&Ox3  SKIN: No rashes or lesions    LABS:                        12.3   13.53 )-----------( 209      ( 2024 11:00 )             37.4         136  |  101  |  6<L>  ----------------------------<  90  3.4<L>   |  23  |  0.74    Ca    8.9      2024 11:00    TPro  7.7  /  Alb  4.1  /  TBili  0.3  /  DBili  x   /  AST  28  /  ALT  29  /  AlkPhos  67            Urinalysis Basic - ( 2024 11:00 )    Color: Yellow / Appearance: Cloudy / S.016 / pH: x  Gluc: 90 mg/dL / Ketone: Negative mg/dL  / Bili: Negative / Urobili: 0.2 mg/dL   Blood: x / Protein: 30 mg/dL / Nitrite: Positive   Leuk Esterase: Moderate / RBC: 8 /HPF / WBC 17 /HPF   Sq Epi: x / Non Sq Epi: 8 /HPF / Bacteria: Many /HPF

## 2024-01-26 LAB
ANION GAP SERPL CALC-SCNC: 13 MMOL/L — SIGNIFICANT CHANGE UP (ref 7–14)
BASOPHILS # BLD AUTO: 0.03 K/UL — SIGNIFICANT CHANGE UP (ref 0–0.2)
BASOPHILS NFR BLD AUTO: 0.4 % — SIGNIFICANT CHANGE UP (ref 0–2)
BUN SERPL-MCNC: 6 MG/DL — LOW (ref 7–23)
CALCIUM SERPL-MCNC: 8.2 MG/DL — LOW (ref 8.4–10.5)
CHLORIDE SERPL-SCNC: 104 MMOL/L — SIGNIFICANT CHANGE UP (ref 98–107)
CO2 SERPL-SCNC: 22 MMOL/L — SIGNIFICANT CHANGE UP (ref 22–31)
CREAT SERPL-MCNC: 0.5 MG/DL — SIGNIFICANT CHANGE UP (ref 0.5–1.3)
EGFR: 126 ML/MIN/1.73M2 — SIGNIFICANT CHANGE UP
EOSINOPHIL # BLD AUTO: 0.12 K/UL — SIGNIFICANT CHANGE UP (ref 0–0.5)
EOSINOPHIL NFR BLD AUTO: 1.7 % — SIGNIFICANT CHANGE UP (ref 0–6)
GLUCOSE SERPL-MCNC: 89 MG/DL — SIGNIFICANT CHANGE UP (ref 70–99)
HCT VFR BLD CALC: 30.5 % — LOW (ref 34.5–45)
HGB BLD-MCNC: 9.8 G/DL — LOW (ref 11.5–15.5)
IANC: 4.18 K/UL — SIGNIFICANT CHANGE UP (ref 1.8–7.4)
IMM GRANULOCYTES NFR BLD AUTO: 0.3 % — SIGNIFICANT CHANGE UP (ref 0–0.9)
LYMPHOCYTES # BLD AUTO: 1.64 K/UL — SIGNIFICANT CHANGE UP (ref 1–3.3)
LYMPHOCYTES # BLD AUTO: 23.5 % — SIGNIFICANT CHANGE UP (ref 13–44)
MAGNESIUM SERPL-MCNC: 2 MG/DL — SIGNIFICANT CHANGE UP (ref 1.6–2.6)
MCHC RBC-ENTMCNC: 28.2 PG — SIGNIFICANT CHANGE UP (ref 27–34)
MCHC RBC-ENTMCNC: 32.1 GM/DL — SIGNIFICANT CHANGE UP (ref 32–36)
MCV RBC AUTO: 87.6 FL — SIGNIFICANT CHANGE UP (ref 80–100)
MONOCYTES # BLD AUTO: 0.98 K/UL — HIGH (ref 0–0.9)
MONOCYTES NFR BLD AUTO: 14.1 % — HIGH (ref 2–14)
NEUTROPHILS # BLD AUTO: 4.18 K/UL — SIGNIFICANT CHANGE UP (ref 1.8–7.4)
NEUTROPHILS NFR BLD AUTO: 60 % — SIGNIFICANT CHANGE UP (ref 43–77)
NRBC # BLD: 0 /100 WBCS — SIGNIFICANT CHANGE UP (ref 0–0)
NRBC # FLD: 0 K/UL — SIGNIFICANT CHANGE UP (ref 0–0)
PHOSPHATE SERPL-MCNC: 3 MG/DL — SIGNIFICANT CHANGE UP (ref 2.5–4.5)
PLATELET # BLD AUTO: 169 K/UL — SIGNIFICANT CHANGE UP (ref 150–400)
POTASSIUM SERPL-MCNC: 4 MMOL/L — SIGNIFICANT CHANGE UP (ref 3.5–5.3)
POTASSIUM SERPL-SCNC: 4 MMOL/L — SIGNIFICANT CHANGE UP (ref 3.5–5.3)
RBC # BLD: 3.48 M/UL — LOW (ref 3.8–5.2)
RBC # FLD: 13.7 % — SIGNIFICANT CHANGE UP (ref 10.3–14.5)
SODIUM SERPL-SCNC: 139 MMOL/L — SIGNIFICANT CHANGE UP (ref 135–145)
WBC # BLD: 6.97 K/UL — SIGNIFICANT CHANGE UP (ref 3.8–10.5)
WBC # FLD AUTO: 6.97 K/UL — SIGNIFICANT CHANGE UP (ref 3.8–10.5)

## 2024-01-26 PROCEDURE — 74177 CT ABD & PELVIS W/CONTRAST: CPT | Mod: 26

## 2024-01-26 RX ORDER — SODIUM CHLORIDE 9 MG/ML
1000 INJECTION, SOLUTION INTRAVENOUS ONCE
Refills: 0 | Status: COMPLETED | OUTPATIENT
Start: 2024-01-26 | End: 2024-01-26

## 2024-01-26 RX ORDER — SODIUM CHLORIDE 9 MG/ML
500 INJECTION, SOLUTION INTRAVENOUS ONCE
Refills: 0 | Status: DISCONTINUED | OUTPATIENT
Start: 2024-01-26 | End: 2024-01-26

## 2024-01-26 RX ORDER — ACETAMINOPHEN 500 MG
650 TABLET ORAL ONCE
Refills: 0 | Status: COMPLETED | OUTPATIENT
Start: 2024-01-26 | End: 2024-01-26

## 2024-01-26 RX ORDER — SODIUM CHLORIDE 9 MG/ML
1000 INJECTION, SOLUTION INTRAVENOUS
Refills: 0 | Status: DISCONTINUED | OUTPATIENT
Start: 2024-01-26 | End: 2024-01-29

## 2024-01-26 RX ADMIN — Medication 650 MILLIGRAM(S): at 10:55

## 2024-01-26 RX ADMIN — Medication 4 GRAM(S): at 12:22

## 2024-01-26 RX ADMIN — Medication 650 MILLIGRAM(S): at 09:55

## 2024-01-26 RX ADMIN — CEFTRIAXONE 100 MILLIGRAM(S): 500 INJECTION, POWDER, FOR SOLUTION INTRAMUSCULAR; INTRAVENOUS at 12:22

## 2024-01-26 RX ADMIN — SODIUM CHLORIDE 100 MILLILITER(S): 9 INJECTION, SOLUTION INTRAVENOUS at 11:04

## 2024-01-26 RX ADMIN — SODIUM CHLORIDE 1000 MILLILITER(S): 9 INJECTION, SOLUTION INTRAVENOUS at 09:55

## 2024-01-26 NOTE — PROGRESS NOTE ADULT - SUBJECTIVE AND OBJECTIVE BOX
************************  Enrrique Pisano MD  Internal Medicine PGY-1  ************************    Patient is a 33y old  Female who presents with a chief complaint of Severe Sepsis 2/2 UTI    Overnight no events, this morning patient with no acute complaints. Patient urinating well with BM(s). Denies CP, SOB, fevers/chills, N/V, or abdominal pain. Patient reminded of ongoing careplan.    MEDICATIONS  (STANDING):  cefTRIAXone   IVPB 1000 milliGRAM(s) IV Intermittent every 24 hours  omega-3-Acid Ethyl Esters 4 Gram(s) Oral daily    MEDICATIONS  (PRN):  acetaminophen   IVPB .. 1000 milliGRAM(s) IV Intermittent every 8 hours PRN Temp greater or equal to 38C (100.4F)      CAPILLARY BLOOD GLUCOSE        I&O's Summary      PHYSICAL EXAM:  Vital Signs Last 24 Hrs  T(C): 37.4 (2024 06:16), Max: 39.2 (2024 16:40)  T(F): 99.3 (2024 06:16), Max: 102.5 (2024 16:40)  HR: 108 (2024 06:16) (98 - 122)  BP: 102/68 (2024 06:16) (102/66 - 115/68)  BP(mean): --  RR: 17 (2024 06:16) (17 - 18)  SpO2: 99% (2024 06:16) (96% - 100%)    Parameters below as of 2024 06:16  Patient On (Oxygen Delivery Method): room air        PHYSICAL EXAM:  GENERAL: NAD, lying in bed comfortably  HEENT: NC/AT  NECK: Supple, No JVD  CHEST/LUNG: CTAB, no increased WOB  HEART: RRR, no m/r/g, +2 pulses bilaterally  ABDOMEN: soft, non-tender, non-distended, BS+  EXTREMITIES:  2+ peripheral pulses, no clubbing, no edema  MSK: mild b/l flank CVA tenderness  NERVOUS SYSTEM:  A&Ox3  SKIN: No rashes or lesions    LABS:                        12.3   13.53 )-----------( 209      ( 2024 11:00 )             37.4         136  |  101  |  6<L>  ----------------------------<  90  3.4<L>   |  23  |  0.74    Ca    8.9      2024 11:00    TPro  7.7  /  Alb  4.1  /  TBili  0.3  /  DBili  x   /  AST  28  /  ALT  29  /  AlkPhos  67            Urinalysis Basic - ( 2024 11:00 )    Color: Yellow / Appearance: Cloudy / S.016 / pH: x  Gluc: 90 mg/dL / Ketone: Negative mg/dL  / Bili: Negative / Urobili: 0.2 mg/dL   Blood: x / Protein: 30 mg/dL / Nitrite: Positive   Leuk Esterase: Moderate / RBC: 8 /HPF / WBC 17 /HPF   Sq Epi: x / Non Sq Epi: 8 /HPF / Bacteria: Many /HPF

## 2024-01-26 NOTE — PROGRESS NOTE ADULT - PROBLEM SELECTOR PLAN 1
- Patient arrived to ED with , /63, Oral temp 99F  - s/p 1L IVF bolus x2, Zofran, CTX in ED  - Leukocytosis of 13.53 and Lactate 2.2  - UA grossly positive    Plan:  - c/w CTX  - UCx --> E. Coli  - BCx --> NGTD

## 2024-01-27 DIAGNOSIS — N15.1 RENAL AND PERINEPHRIC ABSCESS: ICD-10-CM

## 2024-01-27 LAB
-  AMOXICILLIN/CLAVULANIC ACID: SIGNIFICANT CHANGE UP
-  AMPICILLIN/SULBACTAM: SIGNIFICANT CHANGE UP
-  AMPICILLIN: SIGNIFICANT CHANGE UP
-  AZTREONAM: SIGNIFICANT CHANGE UP
-  CEFAZOLIN: SIGNIFICANT CHANGE UP
-  CEFEPIME: SIGNIFICANT CHANGE UP
-  CEFOXITIN: SIGNIFICANT CHANGE UP
-  CEFTRIAXONE: SIGNIFICANT CHANGE UP
-  CEFUROXIME: SIGNIFICANT CHANGE UP
-  CIPROFLOXACIN: SIGNIFICANT CHANGE UP
-  ERTAPENEM: SIGNIFICANT CHANGE UP
-  GENTAMICIN: SIGNIFICANT CHANGE UP
-  IMIPENEM: SIGNIFICANT CHANGE UP
-  LEVOFLOXACIN: SIGNIFICANT CHANGE UP
-  MEROPENEM: SIGNIFICANT CHANGE UP
-  NITROFURANTOIN: SIGNIFICANT CHANGE UP
-  PIPERACILLIN/TAZOBACTAM: SIGNIFICANT CHANGE UP
-  TOBRAMYCIN: SIGNIFICANT CHANGE UP
-  TRIMETHOPRIM/SULFAMETHOXAZOLE: SIGNIFICANT CHANGE UP
ANION GAP SERPL CALC-SCNC: 12 MMOL/L — SIGNIFICANT CHANGE UP (ref 7–14)
BASOPHILS # BLD AUTO: 0.03 K/UL — SIGNIFICANT CHANGE UP (ref 0–0.2)
BASOPHILS NFR BLD AUTO: 0.4 % — SIGNIFICANT CHANGE UP (ref 0–2)
BLD GP AB SCN SERPL QL: NEGATIVE — SIGNIFICANT CHANGE UP
BUN SERPL-MCNC: 6 MG/DL — LOW (ref 7–23)
CALCIUM SERPL-MCNC: 8.5 MG/DL — SIGNIFICANT CHANGE UP (ref 8.4–10.5)
CHLORIDE SERPL-SCNC: 102 MMOL/L — SIGNIFICANT CHANGE UP (ref 98–107)
CO2 SERPL-SCNC: 23 MMOL/L — SIGNIFICANT CHANGE UP (ref 22–31)
CREAT SERPL-MCNC: 0.53 MG/DL — SIGNIFICANT CHANGE UP (ref 0.5–1.3)
CULTURE RESULTS: ABNORMAL
EGFR: 124 ML/MIN/1.73M2 — SIGNIFICANT CHANGE UP
EOSINOPHIL # BLD AUTO: 0.12 K/UL — SIGNIFICANT CHANGE UP (ref 0–0.5)
EOSINOPHIL NFR BLD AUTO: 1.8 % — SIGNIFICANT CHANGE UP (ref 0–6)
FERRITIN SERPL-MCNC: 58 NG/ML — SIGNIFICANT CHANGE UP (ref 15–150)
GLUCOSE SERPL-MCNC: 90 MG/DL — SIGNIFICANT CHANGE UP (ref 70–99)
HCT VFR BLD CALC: 30.7 % — LOW (ref 34.5–45)
HGB BLD-MCNC: 10 G/DL — LOW (ref 11.5–15.5)
IANC: 4.22 K/UL — SIGNIFICANT CHANGE UP (ref 1.8–7.4)
IMM GRANULOCYTES NFR BLD AUTO: 0.3 % — SIGNIFICANT CHANGE UP (ref 0–0.9)
IRON SATN MFR SERPL: 25 % — SIGNIFICANT CHANGE UP (ref 14–50)
IRON SATN MFR SERPL: 69 UG/DL — SIGNIFICANT CHANGE UP (ref 30–160)
LYMPHOCYTES # BLD AUTO: 1.54 K/UL — SIGNIFICANT CHANGE UP (ref 1–3.3)
LYMPHOCYTES # BLD AUTO: 23.1 % — SIGNIFICANT CHANGE UP (ref 13–44)
MAGNESIUM SERPL-MCNC: 1.9 MG/DL — SIGNIFICANT CHANGE UP (ref 1.6–2.6)
MCHC RBC-ENTMCNC: 28.2 PG — SIGNIFICANT CHANGE UP (ref 27–34)
MCHC RBC-ENTMCNC: 32.6 GM/DL — SIGNIFICANT CHANGE UP (ref 32–36)
MCV RBC AUTO: 86.7 FL — SIGNIFICANT CHANGE UP (ref 80–100)
METHOD TYPE: SIGNIFICANT CHANGE UP
MONOCYTES # BLD AUTO: 0.74 K/UL — SIGNIFICANT CHANGE UP (ref 0–0.9)
MONOCYTES NFR BLD AUTO: 11.1 % — SIGNIFICANT CHANGE UP (ref 2–14)
NEUTROPHILS # BLD AUTO: 4.22 K/UL — SIGNIFICANT CHANGE UP (ref 1.8–7.4)
NEUTROPHILS NFR BLD AUTO: 63.3 % — SIGNIFICANT CHANGE UP (ref 43–77)
NRBC # BLD: 0 /100 WBCS — SIGNIFICANT CHANGE UP (ref 0–0)
NRBC # FLD: 0 K/UL — SIGNIFICANT CHANGE UP (ref 0–0)
ORGANISM # SPEC MICROSCOPIC CNT: ABNORMAL
ORGANISM # SPEC MICROSCOPIC CNT: ABNORMAL
PHOSPHATE SERPL-MCNC: 3.6 MG/DL — SIGNIFICANT CHANGE UP (ref 2.5–4.5)
PLATELET # BLD AUTO: 232 K/UL — SIGNIFICANT CHANGE UP (ref 150–400)
POTASSIUM SERPL-MCNC: 3.8 MMOL/L — SIGNIFICANT CHANGE UP (ref 3.5–5.3)
POTASSIUM SERPL-SCNC: 3.8 MMOL/L — SIGNIFICANT CHANGE UP (ref 3.5–5.3)
RBC # BLD: 3.54 M/UL — LOW (ref 3.8–5.2)
RBC # FLD: 13.3 % — SIGNIFICANT CHANGE UP (ref 10.3–14.5)
RH IG SCN BLD-IMP: POSITIVE — SIGNIFICANT CHANGE UP
SODIUM SERPL-SCNC: 137 MMOL/L — SIGNIFICANT CHANGE UP (ref 135–145)
SPECIMEN SOURCE: SIGNIFICANT CHANGE UP
TIBC SERPL-MCNC: 272 UG/DL — SIGNIFICANT CHANGE UP (ref 220–430)
UIBC SERPL-MCNC: 203 UG/DL — SIGNIFICANT CHANGE UP (ref 110–370)
WBC # BLD: 6.67 K/UL — SIGNIFICANT CHANGE UP (ref 3.8–10.5)
WBC # FLD AUTO: 6.67 K/UL — SIGNIFICANT CHANGE UP (ref 3.8–10.5)

## 2024-01-27 PROCEDURE — 99222 1ST HOSP IP/OBS MODERATE 55: CPT

## 2024-01-27 PROCEDURE — 99232 SBSQ HOSP IP/OBS MODERATE 35: CPT | Mod: GC

## 2024-01-27 RX ORDER — CEFTRIAXONE 500 MG/1
2000 INJECTION, POWDER, FOR SOLUTION INTRAMUSCULAR; INTRAVENOUS EVERY 24 HOURS
Refills: 0 | Status: DISCONTINUED | OUTPATIENT
Start: 2024-01-28 | End: 2024-01-28

## 2024-01-27 RX ORDER — ACETAMINOPHEN 500 MG
650 TABLET ORAL ONCE
Refills: 0 | Status: COMPLETED | OUTPATIENT
Start: 2024-01-27 | End: 2024-01-27

## 2024-01-27 RX ADMIN — Medication 650 MILLIGRAM(S): at 23:04

## 2024-01-27 RX ADMIN — Medication 100 MILLIGRAM(S): at 21:18

## 2024-01-27 RX ADMIN — Medication 650 MILLIGRAM(S): at 11:09

## 2024-01-27 RX ADMIN — Medication 650 MILLIGRAM(S): at 22:04

## 2024-01-27 RX ADMIN — Medication 4 GRAM(S): at 12:40

## 2024-01-27 RX ADMIN — Medication 650 MILLIGRAM(S): at 10:09

## 2024-01-27 RX ADMIN — CEFTRIAXONE 100 MILLIGRAM(S): 500 INJECTION, POWDER, FOR SOLUTION INTRAMUSCULAR; INTRAVENOUS at 12:39

## 2024-01-27 NOTE — CONSULT NOTE ADULT - ASSESSMENT
Recurrent L pyelonephritis in 33F without other medical history.   Possible evolving abscess, but appears to be making progress regardless.   Abscesses <5cm frequently respond well to antibiotics.   Only minimal CVAT presently  Current antibiotics adequate, I see role to alter Rx at this time    Suggestions--  Continue ceftriaxone  Serial exams  While there is not 'gold standard' with respect to treatment of renal abscesses, treatment is typically more prolonged.   OPD urology follow up to assess for any functional or anatomic abnormality appropriate  Timing of repeat imaging as per urology  When otherwise ready for discharge, levofloxacin may represent a good option- sensitive on current and prior recent result, offers excellent tissue/urinary levels, oral bioavailability equivalent to PO.    Thank you for the courtesy of this referral.  Nicholas Meadows MD  Attending Physician  [facility]  Division of Infectious Diseases  [phone]

## 2024-01-27 NOTE — CONSULT NOTE ADULT - ASSESSMENT
33F w/ no significant PMH presenting for pyelonephritis, with CT showing pyelonephritis with possible evolving abscess.    - Labs reviewed  - CT reviewed, showing pyelonephritis with area of concern measuring ~2 cm  - Continue antibiotics  - Consider ID consult to guide antibiotic treatment given previous UTI in December  - Hydration  - Recommend PVR to ensure complete bladder emptying    Case discussed with Dr. Godoy 33F w/ no significant PMH presenting for pyelonephritis, with CT showing pyelonephritis with possible evolving abscess.    - Labs reviewed  - CT reviewed, showing pyelonephritis with area of concern measuring ~2 cm  - Continue antibiotics  - Renal US in 1 week to evaluate L kidney  - Consider ID consult to guide antibiotic treatment given previous UTI in December  - Hydration  - Recommend PVR to ensure complete bladder emptying    Case discussed with Dr. Godoy

## 2024-01-27 NOTE — CONSULT NOTE ADULT - SUBJECTIVE AND OBJECTIVE BOX
HPI  33 F w/ PMHx of UTI (ESBL E. Coli in , pansensitive E. Coli in Dec 23') p/w 2 days of R flank pain, dysuria, fevers, cloudy/foul smelling urine. She that it feels like UTIs she has had in the past. She also has nausea today but no vomiting. Patient states that on  she began to feel unwell, and on  she began to have fever, body ache, R flank pain, and noted that her urine was cloudy. Symptoms continued to progress and patient reports fevers at home up to 103. Due to worsenin condition and prior experience with UTIs, patient decided to come to the ED. She denies any headache, dizziness, abdominal pain, diarrhea, or rashes. Currently endorses R flank pain, dysuria, R knee pain persistent since November). She does note that R flank pain has been present since last admission in December, and is unchanged.    ED course: Patient arrived to ED with , /63, Oral temp 99F, saturating well on RA. Pt received 1L IVF bolus x2, Zofran, CTX. UA was grossly positive. Blood work showed Leukocytosis of 13.53 and Lactate 2.2.    Urology consulted for developing abscess. Patient has had UTIs in past, including in December for which she was admitted for pyelonephritis. Urine culture in December was positive for E. coli (R to Bactrim). Urine culture during this admission also positive for E. coli (pan-sensitive). Denies dysuria, gross hematuria, incomplete emptying. Reports L flank pain that has been improving.      PAST MEDICAL & SURGICAL HISTORY:  E-coli UTI      History of           MEDICATIONS  (STANDING):  lactated ringers. 1000 milliLiter(s) (100 mL/Hr) IV Continuous <Continuous>  omega-3-Acid Ethyl Esters 4 Gram(s) Oral daily    MEDICATIONS  (PRN):  acetaminophen   IVPB .. 1000 milliGRAM(s) IV Intermittent every 8 hours PRN Temp greater or equal to 38C (100.4F)      FAMILY HISTORY:  FH: HTN (hypertension)    Family history of diabetes mellitus (DM)        Allergies    No Known Allergies    Intolerances        SOCIAL HISTORY:    REVIEW OF SYSTEMS:   Otherwise negative as stated in HPI    Physical Exam  Vital signs  T(C): 36.8 (24 @ 05:29), Max: 36.8 (24 @ 05:29)  HR: 87 (24 @ 05:29)  BP: 104/74 (24 @ 05:29)  SpO2: 100% (24 @ 05:29)  Wt(kg): --    Output      Gen: NAD  Pulm: No respiratory distress  Abd: Soft, nontender, nondistended, mild L CVA tenderness  : Voiding spontaneously        LABS:       @ 06:36    WBC 6.67  / Hct 30.7  / SCr 0.53      @ 06:31    WBC 6.97  / Hct 30.5  / SCr 0.50         137  |  102  |  6<L>  ----------------------------<  90  3.8   |  23  |  0.53    Ca    8.5      2024 06:36  Phos  3.6       Mg     1.90             Urinalysis Basic - ( 2024 06:36 )    Color: x / Appearance: x / SG: x / pH: x  Gluc: 90 mg/dL / Ketone: x  / Bili: x / Urobili: x   Blood: x / Protein: x / Nitrite: x   Leuk Esterase: x / RBC: x / WBC x   Sq Epi: x / Non Sq Epi: x / Bacteria: x        Urine Cx: E. coli  Blood Cx: NGTD    RADIOLOGY:    ACC: [id number] EXAM: CT ABDOMEN AND PELVIS IC ORDERED BY: DANIEL DIXON    PROCEDURE DATE: 2024        INTERPRETATION: CLINICAL INFORMATION: 33-year-old female with pyelonephritis. Now with worsening back pain and anemia.    COMPARISON: CT scan 2023    CONTRAST/COMPLICATIONS:  IV Contrast: Omnipaque 350 90 cc administered 10 cc discarded  Oral Contrast: NONE  Complications: None reported at time of study completion    PROCEDURE:  CT of the Abdomen and Pelvis was performed.  Sagittal and coronal reformats were performed.    FINDINGS:  LOWER CHEST: Trace bilateral pleural effusions and basilar atelectasis.    LIVER: Within normal limits.  BILE DUCTS: Normal caliber.  GALLBLADDER: Within normal limits.  SPLEEN: Within normal limits.  PANCREAS: Within normal limits.  ADRENALS: Within normal limits.  KIDNEYS/URETERS: Multiple segment foci of hypoperfusion left kidney consistent with pyelonephritis. New tubular low attenuation mid anterior lower pole focus suggesting evolving abscess. (2:62)    BLADDER: Within normal limits.  REPRODUCTIVE ORGANS: Uterus and adnexa within normal limits. Indwelling IUD.    BOWEL: No bowel obstruction. Appendix normal.  PERITONEUM: No ascites.  VESSELS: Within normal limits.  RETROPERITONEUM/LYMPH NODES: No lymphadenopathy.  ABDOMINAL WALL: Within normal limits.  BONES: Within normal limits.    IMPRESSION:  Left-sided pyelonephritis with question of evolving abscess.
[facility]  Division of Infectious Diseases  [phone]    CANDIDO SINGH  33y, Female  [id number]    HPI--  33F hx UTI, most recently  with L sided pyelonephritis presents with L flank pain, fevers, chills, weakness, myalgias beginning 2-3 days prior to admission. Patient presented here and treated vs pyelonephritis. Urine culture with E. coli, blood cx NTD. CT c/w pyelonephritis and question of evolving small abscess. No further fevers, WBC normalized. Still with some residual L sided tenderness.  Tolerating PO without issues. Denies any difficulty urinating. Prior to Dec states last UTI was in 2017. No known hx renal stones, functional or anatomic abnormality (though bladder distended on the prior CT to the point where there was B hydronephrosis on the prior CT scan). Patient states that recovered from the prior UTI uneventfully and was asymptomatic. Denies any persistent pain.      PMH/PSH--  No pertinent past medical history    Numerous skin moles    UTI (urinary tract infection)    No pertinent past medical history    E-coli UTI    S/P     History of         Allergies--  No Known Allergies      Medications--  Antibiotics:   Immunologic:   Other: acetaminophen   IVPB .. PRN  lactated ringers.  omega-3-Acid Ethyl Esters    Antimicrobials last 90 days per EMR: MEDICATIONS  (STANDING):  cefTRIAXone   IVPB   100 mL/Hr IV Intermittent (24 @ 12:39)   100 mL/Hr IV Intermittent (24 @ 12:22)   100 mL/Hr IV Intermittent (24 @ 13:09)    cefTRIAXone   IVPB   100 mL/Hr IV Intermittent (24 @ 13:27)        Social History--  EtOH: denies   Tobacco: denies   Drug Use: denies     Family/Marital History--    3 children  No sick contacts  No pertinent family history in first degree relatives  FH: HTN (hypertension)  Family history of diabetes mellitus (DM)          Travel/Environmental/Occupational History:  No travel  No pets  Works in [employer]     Review of Systems:  A >=10-point review of systems was obtained.   Review of systems otherwise negative except as previously noted.    Physical Exam--  Vital Signs: T(F): 97.9 (24 @ 13:53), Max: 98.2 (24 @ 05:29)  HR: 85 (24 @ 13:53)  BP: 100/73 (24 @ 13:53)  RR: 18 (24 @ 13:53)  SpO2: 100% (24 @ 13:53)  Wt(kg): --  General: Nontoxic-appearing Female in no acute distress.  HEENT: AT/NC. PERRL. EOMI. Anicteric. Conjunctiva pink and moist.   Neck: Not rigid. No sense of mass.  Nodes: None palpable.  Lungs: Clear bilaterally without rales, wheezing or rhonchi  Heart: Regular rate and rhythm.   Abdomen: Bowel sounds present and normoactive. Soft. Nondistended. Nontender. No sense of mass. No organomegaly.  Back: No spinal tenderness. + L costovertebral angle tenderness.   Extremities: No cyanosis or clubbing. No edema.   Skin: Warm. Dry. Good turgor. No rash. No vasculitic stigmata.  Psychiatric: Appropriate affect and mood for situation.         Laboratory & Imaging Data--  CBC                        10.0   6.67  )-----------( 232      ( 2024 06:36 )             30.7     WBC Count: 6.97 K/uL (24 @ 06:31)  WBC Count: 13.53 K/uL (24 @ 11:00)      Chemistries      137  |  102  |  6<L>  ----------------------------<  90  3.8   |  23  |  0.53    Ca    8.5      2024 06:36  Phos  3.6       Mg     1.90             Culture Data    Culture - Blood (collected 2024 17:32)  Source: .Blood Blood-Venous  Preliminary Report (2024 19:02):    No growth at 48 Hours    Culture - Blood (collected 2024 17:32)  Source: .Blood Blood-Peripheral  Preliminary Report (2024 19:02):    No growth at 48 Hours    Culture - Urine (24 @ 11:24)    -  Piperacillin/Tazobactam: S <=8   -  Tobramycin: S <=2   -  Trimethoprim/Sulfamethoxazole: S 2/38   -  Cefuroxime: S <=4   -  Ciprofloxacin: S <=0.25   -  Ceftriaxone: S <=1   -  Ertapenem: S <=0.5   -  Gentamicin: S <=2   -  Imipenem: S <=1   -  Levofloxacin: S <=0.5   -  Meropenem: S <=1   -  Nitrofurantoin: S <=32 Should not be used to treat pyelonephritis   -  Amoxicillin/Clavulanic Acid: S <=8/4   -  Ampicillin: S <=8 These ampicillin results predict results for amoxicillin   -  Ampicillin/Sulbactam: S <=4/2   -  Aztreonam: S <=4   -  Cefazolin: S <=2 For uncomplicated UTI with K. pneumoniae, E. coli, or P. mirablis: NAJMA <=16 is sensitive and NAJMA >=32 is resistant. This also predicts results for oral agents cefaclor, cefdinir, cefpodoxime, cefprozil, cefuroxime axetil, cephalexin and locarbef for uncomplicated UTI. Note that some isolates may be susceptible to these agents while testing resistant to cefazolin.   -  Cefepime: S <=2   -  Cefoxitin: S <=8   Specimen Source: Clean Catch Clean Catch (Midstream)   Culture Results:   >100,000 CFU/ml Escherichia coli   Organism Identification: Escherichia coli   Organism: Escherichia coli   Method Type: NAJMA        < from: CT Abdomen and Pelvis w/ IV Cont (24 @ 16:30) >  PROCEDURE:  CT of the Abdomen and Pelvis was performed.  Sagittal and coronal reformats were performed.    FINDINGS:  LOWER CHEST: Trace bilateral pleural effusions and basilar atelectasis.    LIVER: Within normal limits.  BILE DUCTS: Normal caliber.  GALLBLADDER: Within normal limits.  SPLEEN: Within normal limits.  PANCREAS: Within normal limits.  ADRENALS: Within normal limits.  KIDNEYS/URETERS: Multiple segment foci of hypoperfusion left kidney   consistent with pyelonephritis. New tubular low attenuation mid anterior   lower pole focus suggesting evolving abscess. (2:62)    BLADDER: Within normal limits.  REPRODUCTIVE ORGANS: Uterus and adnexa within normal limits. Indwelling   IUD.    BOWEL: No bowel obstruction. Appendix normal.  PERITONEUM: No ascites.  VESSELS: Within normal limits.  RETROPERITONEUM/LYMPH NODES: No lymphadenopathy.  ABDOMINAL WALL: Within normal limits.  BONES: Within normal limits.    IMPRESSION:  Left-sided pyelonephritis with question of evolving abscess.    < end of copied text >

## 2024-01-27 NOTE — PROGRESS NOTE ADULT - PROBLEM SELECTOR PLAN 1
- Patient arrived to ED with , /63, Oral temp 99F  - s/p 1L IVF bolus x2, Zofran, CTX in ED  - Leukocytosis of 13.53 and Lactate 2.2  - UA grossly positive    Plan:  - c/w CTX  - UCx --> E. Coli  - BCx --> NGTD - Patient arrived to ED with , /63, Oral temp 99F  - s/p 1L IVF bolus x2, Zofran, CTX in ED  - Leukocytosis of 13.53 and Lactate 2.2  - UA grossly positive    Plan:  - c/w CTX >> increase to 2g qd 1/28  - UCx --> E. Coli  - BCx --> NGTD  - CT-A/P 1/26: Evolving L renal abscess >> discuss with radiology re size, f/u with urology

## 2024-01-27 NOTE — PROGRESS NOTE ADULT - SUBJECTIVE AND OBJECTIVE BOX
PROGRESS NOTE:     Patient is a 33y old  Female who presents with a chief complaint of Severe Sepsis 2/2 UTI (26 Jan 2024 07:12)      SUBJECTIVE / OVERNIGHT EVENTS: No acute events overnight per night team.     ADDITIONAL REVIEW OF SYSTEMS:    MEDICATIONS  (STANDING):  cefTRIAXone   IVPB 1000 milliGRAM(s) IV Intermittent every 24 hours  lactated ringers. 1000 milliLiter(s) (100 mL/Hr) IV Continuous <Continuous>  omega-3-Acid Ethyl Esters 4 Gram(s) Oral daily    MEDICATIONS  (PRN):  acetaminophen   IVPB .. 1000 milliGRAM(s) IV Intermittent every 8 hours PRN Temp greater or equal to 38C (100.4F)      CAPILLARY BLOOD GLUCOSE        I&O's Summary      PHYSICAL EXAM:  Vital Signs Last 24 Hrs  T(C): 36.8 (27 Jan 2024 05:29), Max: 36.8 (27 Jan 2024 05:29)  T(F): 98.2 (27 Jan 2024 05:29), Max: 98.2 (27 Jan 2024 05:29)  HR: 87 (27 Jan 2024 05:29) (80 - 100)  BP: 104/74 (27 Jan 2024 05:29) (104/57 - 104/74)  BP(mean): --  RR: 18 (27 Jan 2024 05:29) (18 - 18)  SpO2: 100% (27 Jan 2024 05:29) (99% - 100%)    Parameters below as of 27 Jan 2024 05:29  Patient On (Oxygen Delivery Method): room air        CONSTITUTIONAL: NAD, well-developed  RESPIRATORY: Normal respiratory effort; lungs are clear to auscultation bilaterally  CARDIOVASCULAR: Regular rate and rhythm, normal S1 and S2, no murmur/rub/gallop; No lower extremity edema; Peripheral pulses are 2+ bilaterally  ABDOMEN: Nontender to palpation, normoactive bowel sounds, no rebound/guarding; No hepatosplenomegaly  MUSCULOSKELETAL: no clubbing or cyanosis of digits; no joint swelling or tenderness to palpation  PSYCH: A+O to person, place, and time; affect appropriate    LABS:                        9.8    6.97  )-----------( 169      ( 26 Jan 2024 06:31 )             30.5     01-26    139  |  104  |  6<L>  ----------------------------<  89  4.0   |  22  |  0.50    Ca    8.2<L>      26 Jan 2024 06:31  Phos  3.0     01-26  Mg     2.00     01-26    TPro  6.7  /  Alb  3.5  /  TBili  0.4  /  DBili  x   /  AST  24  /  ALT  25  /  AlkPhos  62  01-25          Urinalysis Basic - ( 26 Jan 2024 06:31 )    Color: x / Appearance: x / SG: x / pH: x  Gluc: 89 mg/dL / Ketone: x  / Bili: x / Urobili: x   Blood: x / Protein: x / Nitrite: x   Leuk Esterase: x / RBC: x / WBC x   Sq Epi: x / Non Sq Epi: x / Bacteria: x        Culture - Blood (collected 24 Jan 2024 17:32)  Source: .Blood Blood-Venous  Preliminary Report (26 Jan 2024 19:02):    No growth at 48 Hours    Culture - Blood (collected 24 Jan 2024 17:32)  Source: .Blood Blood-Peripheral  Preliminary Report (26 Jan 2024 19:02):    No growth at 48 Hours    Culture - Urine (collected 24 Jan 2024 11:24)  Source: Clean Catch Clean Catch (Midstream)  Preliminary Report (25 Jan 2024 23:38):    >100,000 CFU/ml Escherichia coli        RADIOLOGY & ADDITIONAL TESTS:  Results Reviewed:   Imaging Personally Reviewed:  Electrocardiogram Personally Reviewed:    COORDINATION OF CARE:  Care Discussed with Consultants/Other Providers [Y/N]:  Prior or Outpatient Records Reviewed [Y/N]:      ******************************  Authored By: Rohan Orr MD PGY3  Internal Medicine  MS Teams - Call or Text  ******************************   PROGRESS NOTE:     Patient is a 33y old  Female who presents with a chief complaint of Severe Sepsis 2/2 UTI (26 Jan 2024 07:12)      SUBJECTIVE / OVERNIGHT EVENTS: No acute events overnight per night team. This morning pt reports continued L Flank pain - denies dysuria or changes in her urine/BMs. Reports increased sweating, but no fevers. Tolerating PO diet well. Pain well controlled with tylenol.    ADDITIONAL REVIEW OF SYSTEMS:    MEDICATIONS  (STANDING):  cefTRIAXone   IVPB 1000 milliGRAM(s) IV Intermittent every 24 hours  lactated ringers. 1000 milliLiter(s) (100 mL/Hr) IV Continuous <Continuous>  omega-3-Acid Ethyl Esters 4 Gram(s) Oral daily    MEDICATIONS  (PRN):  acetaminophen   IVPB .. 1000 milliGRAM(s) IV Intermittent every 8 hours PRN Temp greater or equal to 38C (100.4F)      CAPILLARY BLOOD GLUCOSE        I&O's Summary      PHYSICAL EXAM:  Vital Signs Last 24 Hrs  T(C): 36.8 (27 Jan 2024 05:29), Max: 36.8 (27 Jan 2024 05:29)  T(F): 98.2 (27 Jan 2024 05:29), Max: 98.2 (27 Jan 2024 05:29)  HR: 87 (27 Jan 2024 05:29) (80 - 100)  BP: 104/74 (27 Jan 2024 05:29) (104/57 - 104/74)  BP(mean): --  RR: 18 (27 Jan 2024 05:29) (18 - 18)  SpO2: 100% (27 Jan 2024 05:29) (99% - 100%)    Parameters below as of 27 Jan 2024 05:29  Patient On (Oxygen Delivery Method): room air        GENERAL: NAD, lying in bed comfortably  HEENT: NC/AT  NECK: Supple, No JVD  CHEST/LUNG: CTAB, no increased WOB  HEART: RRR, no m/r/g, +2 pulses bilaterally  ABDOMEN: soft, non-tender, non-distended, BS+  EXTREMITIES:  2+ peripheral pulses, no clubbing, no edema  MSK: mild L flank CVA tenderness  NERVOUS SYSTEM:  A&Ox3  SKIN: No rashes or lesions        LABS:                        9.8    6.97  )-----------( 169      ( 26 Jan 2024 06:31 )             30.5     01-26    139  |  104  |  6<L>  ----------------------------<  89  4.0   |  22  |  0.50    Ca    8.2<L>      26 Jan 2024 06:31  Phos  3.0     01-26  Mg     2.00     01-26    TPro  6.7  /  Alb  3.5  /  TBili  0.4  /  DBili  x   /  AST  24  /  ALT  25  /  AlkPhos  62  01-25          Urinalysis Basic - ( 26 Jan 2024 06:31 )    Color: x / Appearance: x / SG: x / pH: x  Gluc: 89 mg/dL / Ketone: x  / Bili: x / Urobili: x   Blood: x / Protein: x / Nitrite: x   Leuk Esterase: x / RBC: x / WBC x   Sq Epi: x / Non Sq Epi: x / Bacteria: x        Culture - Blood (collected 24 Jan 2024 17:32)  Source: .Blood Blood-Venous  Preliminary Report (26 Jan 2024 19:02):    No growth at 48 Hours    Culture - Blood (collected 24 Jan 2024 17:32)  Source: .Blood Blood-Peripheral  Preliminary Report (26 Jan 2024 19:02):    No growth at 48 Hours    Culture - Urine (collected 24 Jan 2024 11:24)  Source: Clean Catch Clean Catch (Midstream)  Preliminary Report (25 Jan 2024 23:38):    >100,000 CFU/ml Escherichia coli        RADIOLOGY & ADDITIONAL TESTS:  Results Reviewed:   Imaging Personally Reviewed:  Electrocardiogram Personally Reviewed:    COORDINATION OF CARE:  Care Discussed with Consultants/Other Providers [Y/N]:  Prior or Outpatient Records Reviewed [Y/N]:      ******************************  Authored By: Rohan Orr MD PGY3  Internal Medicine  MS Teams - Call or Text  ******************************

## 2024-01-28 ENCOUNTER — TRANSCRIPTION ENCOUNTER (OUTPATIENT)
Age: 34
End: 2024-01-28

## 2024-01-28 LAB
ANION GAP SERPL CALC-SCNC: 11 MMOL/L — SIGNIFICANT CHANGE UP (ref 7–14)
BASOPHILS # BLD AUTO: 0.02 K/UL — SIGNIFICANT CHANGE UP (ref 0–0.2)
BASOPHILS NFR BLD AUTO: 0.3 % — SIGNIFICANT CHANGE UP (ref 0–2)
BUN SERPL-MCNC: 7 MG/DL — SIGNIFICANT CHANGE UP (ref 7–23)
CALCIUM SERPL-MCNC: 8.5 MG/DL — SIGNIFICANT CHANGE UP (ref 8.4–10.5)
CHLORIDE SERPL-SCNC: 100 MMOL/L — SIGNIFICANT CHANGE UP (ref 98–107)
CO2 SERPL-SCNC: 24 MMOL/L — SIGNIFICANT CHANGE UP (ref 22–31)
CREAT SERPL-MCNC: 0.52 MG/DL — SIGNIFICANT CHANGE UP (ref 0.5–1.3)
EGFR: 125 ML/MIN/1.73M2 — SIGNIFICANT CHANGE UP
EOSINOPHIL # BLD AUTO: 0.19 K/UL — SIGNIFICANT CHANGE UP (ref 0–0.5)
EOSINOPHIL NFR BLD AUTO: 3.3 % — SIGNIFICANT CHANGE UP (ref 0–6)
GLUCOSE SERPL-MCNC: 84 MG/DL — SIGNIFICANT CHANGE UP (ref 70–99)
HCT VFR BLD CALC: 32.4 % — LOW (ref 34.5–45)
HGB BLD-MCNC: 10.8 G/DL — LOW (ref 11.5–15.5)
IANC: 3.27 K/UL — SIGNIFICANT CHANGE UP (ref 1.8–7.4)
IMM GRANULOCYTES NFR BLD AUTO: 0.3 % — SIGNIFICANT CHANGE UP (ref 0–0.9)
LYMPHOCYTES # BLD AUTO: 1.52 K/UL — SIGNIFICANT CHANGE UP (ref 1–3.3)
LYMPHOCYTES # BLD AUTO: 26.3 % — SIGNIFICANT CHANGE UP (ref 13–44)
MAGNESIUM SERPL-MCNC: 2.1 MG/DL — SIGNIFICANT CHANGE UP (ref 1.6–2.6)
MCHC RBC-ENTMCNC: 28.7 PG — SIGNIFICANT CHANGE UP (ref 27–34)
MCHC RBC-ENTMCNC: 33.3 GM/DL — SIGNIFICANT CHANGE UP (ref 32–36)
MCV RBC AUTO: 86.2 FL — SIGNIFICANT CHANGE UP (ref 80–100)
MONOCYTES # BLD AUTO: 0.76 K/UL — SIGNIFICANT CHANGE UP (ref 0–0.9)
MONOCYTES NFR BLD AUTO: 13.1 % — SIGNIFICANT CHANGE UP (ref 2–14)
NEUTROPHILS # BLD AUTO: 3.27 K/UL — SIGNIFICANT CHANGE UP (ref 1.8–7.4)
NEUTROPHILS NFR BLD AUTO: 56.7 % — SIGNIFICANT CHANGE UP (ref 43–77)
NRBC # BLD: 0 /100 WBCS — SIGNIFICANT CHANGE UP (ref 0–0)
NRBC # FLD: 0 K/UL — SIGNIFICANT CHANGE UP (ref 0–0)
PHOSPHATE SERPL-MCNC: 4 MG/DL — SIGNIFICANT CHANGE UP (ref 2.5–4.5)
PLATELET # BLD AUTO: 279 K/UL — SIGNIFICANT CHANGE UP (ref 150–400)
POTASSIUM SERPL-MCNC: 4 MMOL/L — SIGNIFICANT CHANGE UP (ref 3.5–5.3)
POTASSIUM SERPL-SCNC: 4 MMOL/L — SIGNIFICANT CHANGE UP (ref 3.5–5.3)
RBC # BLD: 3.76 M/UL — LOW (ref 3.8–5.2)
RBC # FLD: 13.2 % — SIGNIFICANT CHANGE UP (ref 10.3–14.5)
SODIUM SERPL-SCNC: 135 MMOL/L — SIGNIFICANT CHANGE UP (ref 135–145)
WBC # BLD: 5.78 K/UL — SIGNIFICANT CHANGE UP (ref 3.8–10.5)
WBC # FLD AUTO: 5.78 K/UL — SIGNIFICANT CHANGE UP (ref 3.8–10.5)

## 2024-01-28 PROCEDURE — 99233 SBSQ HOSP IP/OBS HIGH 50: CPT

## 2024-01-28 PROCEDURE — 99232 SBSQ HOSP IP/OBS MODERATE 35: CPT | Mod: GC

## 2024-01-28 RX ADMIN — CEFTRIAXONE 100 MILLIGRAM(S): 500 INJECTION, POWDER, FOR SOLUTION INTRAMUSCULAR; INTRAVENOUS at 00:09

## 2024-01-28 RX ADMIN — Medication 4 GRAM(S): at 11:42

## 2024-01-28 RX ADMIN — Medication 100 MILLIGRAM(S): at 22:33

## 2024-01-28 NOTE — PROGRESS NOTE ADULT - SUBJECTIVE AND OBJECTIVE BOX
Subjective  No acute events overnight. Resting comfortably this morning.    Objective    Vital signs  T(F): , Max: 97.9 (01-27-24 @ 13:53)  HR: 79 (01-28-24 @ 05:24)  BP: 94/61 (01-28-24 @ 05:24)  SpO2: 100% (01-28-24 @ 05:24)  Wt(kg): --    Output       Gen: NAD  Abd: soft, nontender, nondistended  : Voiding spontaneously    Labs      01-28 @ 07:55    WBC --    / Hct --    / SCr 0.52     01-28 @ 07:15    WBC 5.78  / Hct 32.4  / SCr --           Culture - Blood (collected 01-24-24 @ 17:32)  Source: .Blood Blood-Venous  Preliminary Report (01-27-24 @ 19:01):    No growth at 72 Hours    Culture - Blood (collected 01-24-24 @ 17:32)  Source: .Blood Blood-Peripheral  Preliminary Report (01-27-24 @ 19:01):    No growth at 72 Hours    Culture - Urine (collected 01-24-24 @ 11:24)  Source: Clean Catch Clean Catch (Midstream)  Final Report (01-27-24 @ 09:57):    >100,000 CFU/ml Escherichia coli  Organism: Escherichia coli (01-27-24 @ 09:57)  Organism: Escherichia coli (01-27-24 @ 09:57)      -  Levofloxacin: S <=0.5      -  Tobramycin: S <=2      -  Nitrofurantoin: S <=32 Should not be used to treat pyelonephritis      -  Aztreonam: S <=4      -  Gentamicin: S <=2      -  Cefazolin: S <=2 For uncomplicated UTI with K. pneumoniae, E. coli, or P. mirablis: NAJMA <=16 is sensitive and NAJMA >=32 is resistant. This also predicts results for oral agents cefaclor, cefdinir, cefpodoxime, cefprozil, cefuroxime axetil, cephalexin and locarbef for uncomplicated UTI. Note that some isolates may be susceptible to these agents while testing resistant to cefazolin.      -  Cefepime: S <=2      -  Piperacillin/Tazobactam: S <=8      -  Ciprofloxacin: S <=0.25      -  Imipenem: S <=1      -  Ceftriaxone: S <=1      -  Ampicillin: S <=8 These ampicillin results predict results for amoxicillin      Method Type: NAJMA      -  Meropenem: S <=1      -  Ampicillin/Sulbactam: S <=4/2      -  Cefoxitin: S <=8      -  Cefuroxime: S <=4      -  Amoxicillin/Clavulanic Acid: S <=8/4      -  Trimethoprim/Sulfamethoxazole: S 2/38      -  Ertapenem: S <=0.5

## 2024-01-28 NOTE — DISCHARGE NOTE PROVIDER - ATTENDING DISCHARGE PHYSICAL EXAMINATION:
.  VITAL SIGNS:  T(C): 36.3 (01-29-24 @ 13:10), Max: 36.8 (01-28-24 @ 21:30)  T(F): 97.3 (01-29-24 @ 13:10), Max: 98.3 (01-28-24 @ 21:30)  HR: 79 (01-29-24 @ 13:10) (78 - 86)  BP: 93/63 (01-29-24 @ 13:10) (93/63 - 100/61)  BP(mean): --  RR: 17 (01-29-24 @ 13:10) (17 - 18)  SpO2: 99% (01-29-24 @ 13:10) (99% - 99%)  Wt(kg): --    PHYSICAL EXAM:    Constitutional: Comfortable and resting in bed   Head: NC/AT  Eyes: PERRL, EOMI, anicteric sclera  ENT: no nasal discharge; uvula midline, no oropharyngeal erythema or exudates; MMM  Neck: supple; no JVD or thyromegaly  Respiratory: CTA B/L; no W/R/R, no retractions  Cardiac: +S1/S2; RRR; no M/R/G; PMI non-displaced  Gastrointestinal: abdomen soft, NT/ND; no rebound or guarding; +BSx4  Back: spine midline, no bony tenderness or step-offs; no CVAT B/L  Extremities: WWP, no clubbing or cyanosis; no peripheral edema  Musculoskeletal: NROM x4; no joint swelling, tenderness or erythema  Vascular: 2+ radial, femoral, DP/PT pulses B/L  Dermatologic: skin warm, dry and intact; no rashes, wounds, or scars  Lymphatic: no submandibular or cervical LAD  Neurologic: AAOx3; CNII-XII grossly intact; no focal deficits  Psychiatric: affect and characteristics of appearance, verbalizations, behaviors are appropriate    Pt. still reporting left sided pain likely 2/2 abscess. Explained she will likely continue to have pain. She needs to complete the antibiotic course (till Feb 13th) and follow up outpatient with urology to assess for resolution of the abscess.

## 2024-01-28 NOTE — DISCHARGE NOTE PROVIDER - NSFOLLOWUPCLINICS_GEN_ALL_ED_FT
[facility] - Urology  Urology  [address], 3rd & 4th [place] [address]  Phone: [phone]  Fax:   Follow Up Time: 1 week

## 2024-01-28 NOTE — DISCHARGE NOTE PROVIDER - NSDCCPCAREPLAN_GEN_ALL_CORE_FT
PRINCIPAL DISCHARGE DIAGNOSIS  Diagnosis: Pyelonephritis  Assessment and Plan of Treatment: You were admitted to the hospital due to a urinary tract infection. Upon further imaging, you were found to have an infection in your kidney as well. In order to treat this you were given antibiotics. You were seen by the urolology team that recommended outpatient imaging so ensure that the abscess on your kidney is getting smaller. Please follow up with your primary care doctor.   If you start to experience symptoms including but not limited to: back pain, nausea, vomiting, burning or painful urination, please return to the emergency room.     PRINCIPAL DISCHARGE DIAGNOSIS  Diagnosis: Pyelonephritis  Assessment and Plan of Treatment: You were admitted to the hospital due to a urinary tract infection. Upon further imaging, you were found to have an infection in your kidney as well. In order to treat this you were given antibiotics. You were seen by the urolology team that recommended outpatient imaging with an ultrasound of the kidney, to ensure that the abscess on your kidney is getting smaller. Please follow up with your primary care doctor.   If you start to experience symptoms including but not limited to: back pain, nausea, vomiting, burning or painful urination, please return to the emergency room.     PRINCIPAL DISCHARGE DIAGNOSIS  Diagnosis: Pyelonephritis  Assessment and Plan of Treatment: You were admitted to the hospital due to a urinary tract infection. Upon further imaging, you were found to have an infection in your kidney as well. In order to treat this you were given antibiotics. You were seen by the urolology team that recommended outpatient imaging with an ultrasound of the kidney, to ensure that the abscess on your kidney is getting smaller after antibiotics - Please follow up with your primary care doctor. You will continue with antibiotics until February 13th.   If you start to experience symptoms including but not limited to: back pain, nausea, vomiting, burning or painful urination, please return to the emergency room.

## 2024-01-28 NOTE — DISCHARGE NOTE PROVIDER - CARE PROVIDER_API CALL
Citlali Lopez [facility]  Internal Medicine  [address] [place] [id number]  Phone: [phone]  Fax: [phone]  Follow Up Time: 1 week

## 2024-01-28 NOTE — PROGRESS NOTE ADULT - SUBJECTIVE AND OBJECTIVE BOX
[facility]  Division of Infectious Diseases  [phone]    Name: CANDIDO SINGH  Age: 33y  Gender: Female  MRN: [id number]    Interval History--  Notes reviewed.     Past Medical History--  No pertinent past medical history    Numerous skin moles    UTI (urinary tract infection)    No pertinent past medical history    E-coli UTI    S/P     History of         For details regarding the patient's social history, family history, and other miscellaneous elements, please refer the initial infectious diseases consultation and/or the admitting history and physical examination for this admission.    Allergies    No Known Allergies    Intolerances        Medications--  Antibiotics:  cefTRIAXone   IVPB 2000 milliGRAM(s) IV Intermittent every 24 hours    Immunologic:    Other:  acetaminophen   IVPB .. PRN  lactated ringers.  omega-3-Acid Ethyl Esters      Review of Systems--  A 10-point review of systems was obtained.     Pertinent positives and negatives--  Constitutional: No fevers. No Chills. No Rigors.   Cardiovascular: No chest pain. No palpitations.  Respiratory: No shortness of breath. No cough.  Gastrointestinal: No nausea or vomiting. No diarrhea or constipation.   Psychiatric: Pleasant. Appropriate affect.    Review of systems otherwise negative except as previously noted.    Physical Examination--  Vital Signs: T(F): 97.6 (24 @ 05:24), Max: 97.9 (24 @ 13:53)  HR: 79 (24 @ 05:24)  BP: 94/61 (24 @ 05:24)  RR: 17 (24 @ 05:24)  SpO2: 100% (24 @ 05:24)  Wt(kg): --  General: Nontoxic-appearing Female in no acute distress.  HEENT: AT/NC. PERRL. EOMI. Anicteric. Conjunctiva pink and moist. Oropharynx clear. Dentition fair.  Neck: Not rigid. No sense of mass.  Nodes: None palpable.  Lungs: Clear bilaterally without rales, wheezing or rhonchi  Heart: Regular rate and rhythm. No Murmur. No rub. No gallop. No palpable thrill.  Abdomen: Bowel sounds present and normoactive. Soft. Nondistended. Nontender. No sense of mass. No organomegaly.  Back: No spinal tenderness. No costovertebral angle tenderness.   Extremities: No cyanosis or clubbing. No edema.   Skin: Warm. Dry. Good turgor. No rash. No vasculitic stigmata.  Psychiatric: Appropriate affect and mood for situation.         Laboratory Studies--  CBC                        10.8   5.78  )-----------( 279      ( 2024 07:15 )             32.4       Chemistries      135  |  100  |  7   ----------------------------<  84  4.0   |  24  |  0.52    Ca    8.5      2024 07:55  Phos  4.0       Mg     2.10             Culture Data    Culture - Blood (collected 2024 17:32)  Source: .Blood Blood-Venous  Preliminary Report (2024 19:01):    No growth at 72 Hours    Culture - Blood (collected 2024 17:32)  Source: .Blood Blood-Peripheral  Preliminary Report (2024 19:01):    No growth at 72 Hours    Culture - Urine (24 @ 11:24)    -  Piperacillin/Tazobactam: S <=8   -  Tobramycin: S <=2   -  Trimethoprim/Sulfamethoxazole: S /   -  Ceftriaxone: S <=1   -  Cefuroxime: S <=4   -  Ciprofloxacin: S <=0.25   -  Ertapenem: S <=0.5   -  Gentamicin: S <=2   -  Imipenem: S <=1   -  Levofloxacin: S <=0.5   -  Meropenem: S <=1   -  Nitrofurantoin: S <=32 Should not be used to treat pyelonephritis   -  Amoxicillin/Clavulanic Acid: S <=8/4   -  Ampicillin: S <=8 These ampicillin results predict results for amoxicillin   -  Ampicillin/Sulbactam: S <=4/2   -  Aztreonam: S <=4   -  Cefazolin: S <=2 For uncomplicated UTI with K. pneumoniae, E. coli, or P. mirablis: NAJMA <=16 is sensitive and NAJMA >=32 is resistant. This also predicts results for oral agents cefaclor, cefdinir, cefpodoxime, cefprozil, cefuroxime axetil, cephalexin and locarbef for uncomplicated UTI. Note that some isolates may be susceptible to these agents while testing resistant to cefazolin.   -  Cefepime: S <=2   -  Cefoxitin: S <=8   Specimen Source: Clean Catch Clean Catch (Midstream)   Culture Results:   >100,000 CFU/ml Escherichia coli   Organism Identification: Escherichia coli   Organism: Escherichia coli   Method Type: NAJMA    No US result yet.           [facility]  Division of Infectious Diseases  [phone]    Name: CANDIDO SINGH  Age: 33y  Gender: Female  MRN: [id number]    Interval History--  Notes reviewed. Seen earlier today. Still with CVA pain, otherwise feeling better.       Past Medical History--  No pertinent past medical history    Numerous skin moles    UTI (urinary tract infection)    No pertinent past medical history    E-coli UTI    S/P     History of         For details regarding the patient's social history, family history, and other miscellaneous elements, please refer the initial infectious diseases consultation and/or the admitting history and physical examination for this admission.    Allergies    No Known Allergies    Intolerances        Medications--  Antibiotics:  cefTRIAXone   IVPB 2000 milliGRAM(s) IV Intermittent every 24 hours    Immunologic:    Other:  acetaminophen   IVPB .. PRN  lactated ringers.  omega-3-Acid Ethyl Esters      Review of Systems--  A 10-point review of systems was obtained.   Review of systems otherwise negative except as previously noted.    Physical Examination--  Vital Signs: T(F): 97.6 (24 @ 05:24), Max: 97.9 (24 @ 13:53)  HR: 79 (24 @ 05:24)  BP: 94/61 (24 @ 05:24)  RR: 17 (24 @ 05:24)  SpO2: 100% (24 @ 05:24)  Wt(kg): --  General: Nontoxic-appearing Female in no acute distress.  HEENT: AT/NC.  Anicteric. Conjunctiva pink and moist. Oropharynx clear.   Neck: Not rigid. No sense of mass.  Nodes: None palpable.  Lungs: Clear bilaterally  Heart: Regular rate and rhythm.   Abdomen: Bowel sounds present and normoactive. Soft. Nondistended. Nontender.  Back: No spinal tenderness. L costovertebral angle tenderness.   Extremities: No cyanosis or clubbing. No edema.   Skin: Warm. Dry. Good turgor. No rash. No vasculitic stigmata. Innumerable moles.  Psychiatric: Appropriate affect and mood for situation.         Laboratory Studies--  CBC                        10.8   5.78  )-----------( 279      ( 2024 07:15 )             32.4       Chemistries      135  |  100  |  7   ----------------------------<  84  4.0   |  24  |  0.52    Ca    8.5      2024 07:55  Phos  4.0       Mg     2.10             Culture Data    Culture - Blood (collected 2024 17:32)  Source: .Blood Blood-Venous  Preliminary Report (2024 19:01):    No growth at 72 Hours    Culture - Blood (collected 2024 17:32)  Source: .Blood Blood-Peripheral  Preliminary Report (2024 19:01):    No growth at 72 Hours    Culture - Urine (24 @ 11:24)    -  Piperacillin/Tazobactam: S <=8   -  Tobramycin: S <=2   -  Trimethoprim/Sulfamethoxazole: S 2/38   -  Ceftriaxone: S <=1   -  Cefuroxime: S <=4   -  Ciprofloxacin: S <=0.25   -  Ertapenem: S <=0.5   -  Gentamicin: S <=2   -  Imipenem: S <=1   -  Levofloxacin: S <=0.5   -  Meropenem: S <=1   -  Nitrofurantoin: S <=32 Should not be used to treat pyelonephritis   -  Amoxicillin/Clavulanic Acid: S <=8/4   -  Ampicillin: S <=8 These ampicillin results predict results for amoxicillin   -  Ampicillin/Sulbactam: S <=4/2   -  Aztreonam: S <=4   -  Cefazolin: S <=2 For uncomplicated UTI with K. pneumoniae, E. coli, or P. mirablis: NAJMA <=16 is sensitive and NAJMA >=32 is resistant. This also predicts results for oral agents cefaclor, cefdinir, cefpodoxime, cefprozil, cefuroxime axetil, cephalexin and locarbef for uncomplicated UTI. Note that some isolates may be susceptible to these agents while testing resistant to cefazolin.   -  Cefepime: S <=2   -  Cefoxitin: S <=8   Specimen Source: Clean Catch Clean Catch (Midstream)   Culture Results:   >100,000 CFU/ml Escherichia coli   Organism Identification: Escherichia coli   Organism: Escherichia coli   Method Type: NAJMA    No US result yet.

## 2024-01-28 NOTE — DISCHARGE NOTE PROVIDER - NSDCCPTREATMENT_GEN_ALL_CORE_FT
PRINCIPAL PROCEDURE  Procedure: CT abdomen  Findings and Treatment: IMPRESSION:  Left-sided pyelonephritis with question of evolving abscess.

## 2024-01-28 NOTE — DISCHARGE NOTE PROVIDER - NSDCMRMEDTOKEN_GEN_ALL_CORE_FT
Fish Oil oral capsule: 1 orally once a day   Fish Oil oral capsule: 1 orally once a day  levoFLOXacin 750 mg oral tablet: 1 tab(s) orally every 24 hours

## 2024-01-28 NOTE — PROGRESS NOTE ADULT - SUBJECTIVE AND OBJECTIVE BOX
************************  Enrrique Pisano MD  Internal Medicine PGY-1  ************************    Patient is a 33y old  Female who presents with a chief complaint of Severe Sepsis 2/2 UTI (27 Jan 2024 15:21)    Overnight no events, this morning patient fatigued, mild L flank pain. Tolerating PO intake, urinating well.    MEDICATIONS  (STANDING):  cefTRIAXone   IVPB 2000 milliGRAM(s) IV Intermittent every 24 hours  lactated ringers. 1000 milliLiter(s) (100 mL/Hr) IV Continuous <Continuous>  omega-3-Acid Ethyl Esters 4 Gram(s) Oral daily    MEDICATIONS  (PRN):  acetaminophen   IVPB .. 1000 milliGRAM(s) IV Intermittent every 8 hours PRN Temp greater or equal to 38C (100.4F)      CAPILLARY BLOOD GLUCOSE        I&O's Summary      PHYSICAL EXAM:  Vital Signs Last 24 Hrs  T(C): 36.4 (28 Jan 2024 05:24), Max: 36.6 (27 Jan 2024 13:53)  T(F): 97.6 (28 Jan 2024 05:24), Max: 97.9 (27 Jan 2024 13:53)  HR: 79 (28 Jan 2024 05:24) (79 - 92)  BP: 94/61 (28 Jan 2024 05:24) (94/61 - 119/78)  BP(mean): --  RR: 17 (28 Jan 2024 05:24) (17 - 18)  SpO2: 100% (28 Jan 2024 05:24) (100% - 100%)    Parameters below as of 28 Jan 2024 05:24  Patient On (Oxygen Delivery Method): room air        PHYSICAL EXAM:  GENERAL: NAD, lying in bed comfortably  HEENT: NC/AT  NECK: Supple, No JVD  CHEST/LUNG: CTAB, no increased WOB  HEART: RRR, no m/r/g  ABDOMEN: soft, NT, ND, BS+  EXTREMITIES:  2+ peripheral pulses, no edema  NERVOUS SYSTEM:  A&Ox3  MSK: L CVA tenderness  SKIN: No rashes or lesions    LABS:                        10.0   6.67  )-----------( 232      ( 27 Jan 2024 06:36 )             30.7     01-27    137  |  102  |  6<L>  ----------------------------<  90  3.8   |  23  |  0.53    Ca    8.5      27 Jan 2024 06:36  Phos  3.6     01-27  Mg     1.90     01-27            Urinalysis Basic - ( 27 Jan 2024 06:36 )    Color: x / Appearance: x / SG: x / pH: x  Gluc: 90 mg/dL / Ketone: x  / Bili: x / Urobili: x   Blood: x / Protein: x / Nitrite: x   Leuk Esterase: x / RBC: x / WBC x   Sq Epi: x / Non Sq Epi: x / Bacteria: x

## 2024-01-28 NOTE — PROGRESS NOTE ADULT - PROBLEM SELECTOR PLAN 1
- Patient arrived to ED with , /63, Oral temp 99F  - s/p 1L IVF bolus x2, Zofran, CTX in ED  - Leukocytosis of 13.53 and Lactate 2.2  - UA grossly positive    Plan:  - c/w CTX >> increase to 2g qd 1/28  - UCx --> E. Coli  - BCx --> NGTD  - CT-A/P 1/26: Evolving L renal abscess --> Urology c/s'd, ID c/s'd  	- Urology recs:   		- Continue antibiotics  		- Renal US in 1 week to evaluate L kidney  		- Consider ID consult to guide antibiotic treatment given previous UTI in December  		- Recommend PVR to ensure complete bladder emptying  	- ID recs:  		- Timing of repeat imaging as per urology  		- d/c w/ Levofloxacin may represent a good option

## 2024-01-28 NOTE — DISCHARGE NOTE PROVIDER - HOSPITAL COURSE
33 F w/ PMHx of UTI (ESBL E. Coli in 2019, pansensitive E. Coli in Dec 23') p/w 2 days of R flank pain, dysuria, fevers, cloudy/foul smelling urine. Due to worsening condition and prior experience with UTIs, patient decided to come to the ED. She denied any headache, dizziness, abdominal pain, diarrhea, or rashes. Endorsed R flank pain, dysuria, R knee pain persistent since November). She does note that R flank pain has been present since last admission in December, and is unchanged. ED course: Patient arrived to ED with , /63, Oral temp 99F, saturating well on RA. Pt received 1L IVF bolus x2, Zofran, CTX. UA was grossly positive. Blood work showed Leukocytosis of 13.53 and Lactate 2.2 which improved to 1 on repeat. Patient was continued on CTX with UCx eventually growing pansensitive E. Coli. Pt continued to have L flank pain so CT A/P was ordered showing a renal abscess. Urology was consulted, recommended that given patient was clinically stable with improving exam and normal labs, patient may follow up outpatient with urology for repeat imaging, c/w abx and complete PVR so ensure patient not retaining. PVR was WNL. ID was consulted and patient was continued on CTX and clinically improved.    On day of discharge, patient is clinically stable with no new exam findings or acute symptoms compared to prior. The patient was seen by the attending physician on the date of discharge and deemed stable and acceptable for discharge. The patient's chronic medical conditions were treated accordingly per the patient's home medication regimen. The patient's medication reconciliation (with changes made to chronic medications), follow up appointments, discharge orders, instructions, and significant lab and diagnostic studies are as noted.    Patient will be discharged to home with close follow up.    Medication changes:  - Levofloxacin     33 F w/ PMHx of UTI (ESBL E. Coli in 2019, pansensitive E. Coli in Dec 23') p/w 2 days of R flank pain, dysuria, fevers, cloudy/foul smelling urine. Due to worsening condition and prior experience with UTIs, patient decided to come to the ED. She denied any headache, dizziness, abdominal pain, diarrhea, or rashes. Endorsed R flank pain, dysuria, R knee pain persistent since November). She does note that R flank pain has been present since last admission in December, and is unchanged. ED course: Patient arrived to ED with , /63, Oral temp 99F, saturating well on RA. Pt received 1L IVF bolus x2, Zofran, CTX. UA was grossly positive. Blood work showed Leukocytosis of 13.53 and Lactate 2.2 which improved to 1 on repeat. Patient was continued on CTX with UCx eventually growing pansensitive E. Coli. Pt continued to have L flank pain so CT A/P was ordered showing a renal abscess. Urology was consulted, recommended that given patient was clinically stable with improving exam and normal labs, patient may follow up outpatient with urology for repeat imaging with Renal US 1 week postdischarge, c/w abx and complete PVR so ensure patient not retaining. PVR was WNL. ID was consulted and patient was continued on CTX and clinically improved.    On day of discharge, patient is clinically stable with no new exam findings or acute symptoms compared to prior. The patient was seen by the attending physician on the date of discharge and deemed stable and acceptable for discharge. The patient's chronic medical conditions were treated accordingly per the patient's home medication regimen. The patient's medication reconciliation (with changes made to chronic medications), follow up appointments, discharge orders, instructions, and significant lab and diagnostic studies are as noted.    Patient will be discharged to home with close follow up.    Medication changes:  - Levofloxacin    Follow Up needed as outpatient:  - PCP  - Renal US in 1 week     33 F w/ PMHx of UTI (ESBL E. Coli in 2019, pansensitive E. Coli in Dec 23') p/w 2 days of R flank pain, dysuria, fevers, cloudy/foul smelling urine. Due to worsening condition and prior experience with UTIs, patient decided to come to the ED. She denied any headache, dizziness, abdominal pain, diarrhea, or rashes. Endorsed R flank pain, dysuria, R knee pain persistent since November). She does note that R flank pain has been present since last admission in December, and is unchanged. ED course: Patient arrived to ED with , /63, Oral temp 99F, saturating well on RA. Pt received 1L IVF bolus x2, Zofran, CTX. UA was grossly positive. Blood work showed Leukocytosis of 13.53 and Lactate 2.2 which improved to 1 on repeat. Patient was continued on CTX with UCx eventually growing pansensitive E. Coli. Pt continued to have L flank pain so CT A/P was ordered showing a renal abscess. Urology was consulted, recommended that given patient was clinically stable with improving exam and normal labs, patient may follow up outpatient with urology for repeat imaging with Renal US 1 week postdischarge, c/w abx and complete PVR so ensure patient not retaining. PVR was WNL. ID was consulted and patient was continued on CTX and clinically improved.    On day of discharge, patient is clinically stable with no new exam findings or acute symptoms compared to prior. The patient was seen by the attending physician on the date of discharge and deemed stable and acceptable for discharge. The patient's chronic medical conditions were treated accordingly per the patient's home medication regimen. The patient's medication reconciliation (with changes made to chronic medications), follow up appointments, discharge orders, instructions, and significant lab and diagnostic studies are as noted.    Patient will be discharged to home with close follow up.    Medication changes:  - Levofloxacin    Follow Up needed as outpatient:  - PCP  - Urology  - Renal US in 1 week     33 F w/ PMHx of UTI (ESBL E. Coli in 2019, pansensitive E. Coli in Dec 23') p/w 2 days of R flank pain, dysuria, fevers, cloudy/foul smelling urine. Due to worsening condition and prior experience with UTIs, patient decided to come to the ED. She denied any headache, dizziness, abdominal pain, diarrhea, or rashes. Endorsed R flank pain, dysuria, R knee pain persistent since November). She does note that R flank pain has been present since last admission in December, and is unchanged. ED course: Patient arrived to ED with , /63, Oral temp 99F, saturating well on RA. Pt received 1L IVF bolus x2, Zofran, CTX. UA was grossly positive. Blood work showed Leukocytosis of 13.53 and Lactate 2.2 which improved to 1 on repeat. Patient was continued on CTX with UCx eventually growing pansensitive E. Coli. Pt continued to have L flank pain so CT A/P was ordered showing a renal abscess. Urology was consulted, recommended that given patient was clinically stable with improving exam and normal labs, patient may follow up outpatient with urology for repeat imaging with Renal US 1 week postdischarge, c/w abx and complete PVR so ensure patient not retaining. PVR was WNL. ID was consulted and patient was continued on CTX and clinically improved. Pt was transitioned to oral Levofloxacin. Pt will be discharge with oral antibiotics to complete a 21 day course    On day of discharge, patient is clinically stable with no new exam findings or acute symptoms compared to prior. The patient was seen by the attending physician on the date of discharge and deemed stable and acceptable for discharge. The patient's chronic medical conditions were treated accordingly per the patient's home medication regimen. The patient's medication reconciliation (with changes made to chronic medications), follow up appointments, discharge orders, instructions, and significant lab and diagnostic studies are as noted.    Patient will be discharged to home with close follow up.    Medication changes:  - Levofloxacin    Follow Up needed as outpatient:  - PCP  - Urology  - Renal US in 1 week

## 2024-01-29 ENCOUNTER — TRANSCRIPTION ENCOUNTER (OUTPATIENT)
Age: 34
End: 2024-01-29

## 2024-01-29 VITALS
DIASTOLIC BLOOD PRESSURE: 63 MMHG | RESPIRATION RATE: 17 BRPM | OXYGEN SATURATION: 99 % | SYSTOLIC BLOOD PRESSURE: 93 MMHG | TEMPERATURE: 97 F | HEART RATE: 79 BPM

## 2024-01-29 LAB
ANION GAP SERPL CALC-SCNC: 14 MMOL/L — SIGNIFICANT CHANGE UP (ref 7–14)
BASOPHILS # BLD AUTO: 0.02 K/UL — SIGNIFICANT CHANGE UP (ref 0–0.2)
BASOPHILS NFR BLD AUTO: 0.3 % — SIGNIFICANT CHANGE UP (ref 0–2)
BUN SERPL-MCNC: 6 MG/DL — LOW (ref 7–23)
CALCIUM SERPL-MCNC: 8.9 MG/DL — SIGNIFICANT CHANGE UP (ref 8.4–10.5)
CHLORIDE SERPL-SCNC: 104 MMOL/L — SIGNIFICANT CHANGE UP (ref 98–107)
CO2 SERPL-SCNC: 23 MMOL/L — SIGNIFICANT CHANGE UP (ref 22–31)
CREAT SERPL-MCNC: 0.56 MG/DL — SIGNIFICANT CHANGE UP (ref 0.5–1.3)
CULTURE RESULTS: SIGNIFICANT CHANGE UP
CULTURE RESULTS: SIGNIFICANT CHANGE UP
EGFR: 123 ML/MIN/1.73M2 — SIGNIFICANT CHANGE UP
EOSINOPHIL # BLD AUTO: 0.23 K/UL — SIGNIFICANT CHANGE UP (ref 0–0.5)
EOSINOPHIL NFR BLD AUTO: 3.3 % — SIGNIFICANT CHANGE UP (ref 0–6)
GLUCOSE SERPL-MCNC: 84 MG/DL — SIGNIFICANT CHANGE UP (ref 70–99)
HCT VFR BLD CALC: 35.1 % — SIGNIFICANT CHANGE UP (ref 34.5–45)
HGB BLD-MCNC: 11.6 G/DL — SIGNIFICANT CHANGE UP (ref 11.5–15.5)
IANC: 4.07 K/UL — SIGNIFICANT CHANGE UP (ref 1.8–7.4)
IMM GRANULOCYTES NFR BLD AUTO: 0.3 % — SIGNIFICANT CHANGE UP (ref 0–0.9)
LYMPHOCYTES # BLD AUTO: 1.85 K/UL — SIGNIFICANT CHANGE UP (ref 1–3.3)
LYMPHOCYTES # BLD AUTO: 26.8 % — SIGNIFICANT CHANGE UP (ref 13–44)
MAGNESIUM SERPL-MCNC: 2.2 MG/DL — SIGNIFICANT CHANGE UP (ref 1.6–2.6)
MCHC RBC-ENTMCNC: 28.6 PG — SIGNIFICANT CHANGE UP (ref 27–34)
MCHC RBC-ENTMCNC: 33 GM/DL — SIGNIFICANT CHANGE UP (ref 32–36)
MCV RBC AUTO: 86.5 FL — SIGNIFICANT CHANGE UP (ref 80–100)
MONOCYTES # BLD AUTO: 0.72 K/UL — SIGNIFICANT CHANGE UP (ref 0–0.9)
MONOCYTES NFR BLD AUTO: 10.4 % — SIGNIFICANT CHANGE UP (ref 2–14)
NEUTROPHILS # BLD AUTO: 4.07 K/UL — SIGNIFICANT CHANGE UP (ref 1.8–7.4)
NEUTROPHILS NFR BLD AUTO: 58.9 % — SIGNIFICANT CHANGE UP (ref 43–77)
NRBC # BLD: 0 /100 WBCS — SIGNIFICANT CHANGE UP (ref 0–0)
NRBC # FLD: 0 K/UL — SIGNIFICANT CHANGE UP (ref 0–0)
PHOSPHATE SERPL-MCNC: 3.3 MG/DL — SIGNIFICANT CHANGE UP (ref 2.5–4.5)
PLATELET # BLD AUTO: 323 K/UL — SIGNIFICANT CHANGE UP (ref 150–400)
POTASSIUM SERPL-MCNC: 4.4 MMOL/L — SIGNIFICANT CHANGE UP (ref 3.5–5.3)
POTASSIUM SERPL-SCNC: 4.4 MMOL/L — SIGNIFICANT CHANGE UP (ref 3.5–5.3)
RBC # BLD: 4.06 M/UL — SIGNIFICANT CHANGE UP (ref 3.8–5.2)
RBC # FLD: 13.1 % — SIGNIFICANT CHANGE UP (ref 10.3–14.5)
SODIUM SERPL-SCNC: 141 MMOL/L — SIGNIFICANT CHANGE UP (ref 135–145)
SPECIMEN SOURCE: SIGNIFICANT CHANGE UP
SPECIMEN SOURCE: SIGNIFICANT CHANGE UP
WBC # BLD: 6.91 K/UL — SIGNIFICANT CHANGE UP (ref 3.8–10.5)
WBC # FLD AUTO: 6.91 K/UL — SIGNIFICANT CHANGE UP (ref 3.8–10.5)

## 2024-01-29 PROCEDURE — 99232 SBSQ HOSP IP/OBS MODERATE 35: CPT

## 2024-01-29 PROCEDURE — 99239 HOSP IP/OBS DSCHRG MGMT >30: CPT | Mod: GC

## 2024-01-29 RX ORDER — LEVOFLOXACIN 5 MG/ML
1 INJECTION, SOLUTION INTRAVENOUS
Qty: 16 | Refills: 0
Start: 2024-01-29 | End: 2024-02-13

## 2024-01-29 RX ADMIN — Medication 4 GRAM(S): at 11:47

## 2024-01-29 NOTE — PROGRESS NOTE ADULT - SUBJECTIVE AND OBJECTIVE BOX
Subjective  No acute events overnight. Remains afebrile.    Objective    Vital signs  T(F): , Max: 98.4 (01-28-24 @ 13:58)  HR: 78 (01-29-24 @ 05:43)  BP: 100/60 (01-29-24 @ 05:43)  SpO2: 99% (01-29-24 @ 05:43)  Wt(kg): --    Output       Gen: NAD  Abd: soft, nontender, nondistended  : Voiding spontaneously    Labs      01-28 @ 07:55    WBC --    / Hct --    / SCr 0.52     01-28 @ 07:15    WBC 5.78  / Hct 32.4  / SCr --           Culture - Blood (collected 01-24-24 @ 17:32)  Source: .Blood Blood-Venous  Preliminary Report (01-28-24 @ 19:01):    No growth at 4 days    Culture - Blood (collected 01-24-24 @ 17:32)  Source: .Blood Blood-Peripheral  Preliminary Report (01-28-24 @ 19:01):    No growth at 4 days    Culture - Urine (collected 01-24-24 @ 11:24)  Source: Clean Catch Clean Catch (Midstream)  Final Report (01-27-24 @ 09:57):    >100,000 CFU/ml Escherichia coli  Organism: Escherichia coli (01-27-24 @ 09:57)  Organism: Escherichia coli (01-27-24 @ 09:57)      Method Type: NAJMA      -  Amoxicillin/Clavulanic Acid: S <=8/4      -  Ampicillin: S <=8 These ampicillin results predict results for amoxicillin      -  Ampicillin/Sulbactam: S <=4/2      -  Aztreonam: S <=4      -  Cefazolin: S <=2 For uncomplicated UTI with K. pneumoniae, E. coli, or P. mirablis: NAJMA <=16 is sensitive and NAJMA >=32 is resistant. This also predicts results for oral agents cefaclor, cefdinir, cefpodoxime, cefprozil, cefuroxime axetil, cephalexin and locarbef for uncomplicated UTI. Note that some isolates may be susceptible to these agents while testing resistant to cefazolin.      -  Cefepime: S <=2      -  Cefoxitin: S <=8      -  Ceftriaxone: S <=1      -  Cefuroxime: S <=4      -  Ciprofloxacin: S <=0.25      -  Ertapenem: S <=0.5      -  Gentamicin: S <=2      -  Imipenem: S <=1      -  Levofloxacin: S <=0.5      -  Meropenem: S <=1      -  Nitrofurantoin: S <=32 Should not be used to treat pyelonephritis      -  Piperacillin/Tazobactam: S <=8      -  Tobramycin: S <=2      -  Trimethoprim/Sulfamethoxazole: S 2/38

## 2024-01-29 NOTE — PROGRESS NOTE ADULT - ATTENDING COMMENTS
Agree with plan as above.
agree with plan as indicated above.
Patient seen and examined, case d/w house staff.    33F w/ PMHx of UTI (ESBL E. Coli in 2019, pansensitive E. Coli in Dec 23') p/w severe sepsis L pyelonephritis, Ucx pansensitive E. coli, BCx ngtd  L flank pain improved    CT a/p: Multiple segment foci of hypoperfusion lt Kidney c/w pyelonephritis. New tubular low attenuation mid anterior lower pole focus suggesting evolving abscess.     A/P:  # L pyelo, CT poss early left renal abscess  c/w ceftriaxone 2g qd, likely transition to levaquin per ID/  repeat renal US in a week  check PVR  f/u ID/
34 y/o F with PMH UTI/pyelo who presents for flank pain, fatigue found to have UTI/pyelo.     #UTI/pyelo, recently treated in December 2023 for pyelo. UCx with pan sensitive E.coli. UA now positive again. Overnight, sepsis 2/2 UTI - Febrile + leukocytosis. Left sided CVA tenderness on exam.  BCx sent. UCx pending. Will hold off on imaging. Kidney function is at baseline. C/w ceftriaxone for now. F/u UCx. Trend fever curve. Tylenol PRN fever.     Remainder of plan as stated above. Plan discussed with HS.
Patient seen and examined, case d/w house staff.    33F w/ PMHx of UTI (ESBL E. Coli in 2019, pansensitive E. Coli in Dec 23') p/w severe sepsis L pyelonephritis  Pt endorses L flank pain this am, CVAT on exam    CT a/p: Multiple segment foci of hypoperfusion lt Kidney c/w pyelonephritis. New tubular low attenuation mid anterior lower pole focus suggesting evolving abscess.     A/P:  # L pyelo, CT poss early left renal abscess  c/w ceftriaxone, inc to 2g qd  Urology consult  ID consult

## 2024-01-29 NOTE — DISCHARGE NOTE NURSING/CASE MANAGEMENT/SOCIAL WORK - PATIENT PORTAL LINK FT
You can access the FollowMyHealth Patient Portal offered by [facility] by registering at the following website: [email or web address]. By joining [employer]’s FollowMyHealth portal, you will also be able to view your health information using other applications (apps) compatible with our system.

## 2024-01-29 NOTE — PROGRESS NOTE ADULT - REASON FOR ADMISSION
Severe Sepsis 2/2 UTI

## 2024-01-29 NOTE — PROGRESS NOTE ADULT - ASSESSMENT
33F w/ no significant PMH presenting for pyelonephritis, with CT showing pyelonephritis with possible evolving abscess.    - Labs pending  - Continue antibiotics per ID - prolonged course, now on PO Levaquin in preparation for discharge  - Hydration  - Given patient is clinically stable with improving exam and normal labs, patient may follow up outpatient with urology for repeat imaging    [facility]  [address] [place] [address] [phone]
33F w/ no significant PMH presenting for pyelonephritis, with CT showing pyelonephritis with possible evolving abscess.    - Labs reviewed - WBC 5.78  - Continue antibiotics per ID - prolonged course, possibly Levaquin  - Hydration  - Recommend PVR to ensure complete bladder emptying  - Given patient is clinically stable with improving exam and normal labs, patient may follow up outpatient with urology for repeat imaging    [facility]  [address] [place] [id number] [phone]
Recurrent L pyelonephritis in 33F without other medical history.   Possible evolving abscess, but appears to be making progress regardless.   Abscesses <5cm frequently respond well to antibiotics.   Only minimal CVAT presently  Current antibiotics adequate, I see role to alter Rx at this time    1/28: Improved. . Reviewed with patient treatment options. Levofloxacin remains a good option.   Levofloxacin   Oral bioavailability is excellent (99%).   Remain mindful of potential adverse events of fluoroquinolones including but not limited to      Potentiation  of coumadin effect. - not an issue here  Prolongation of QT interval, especially with other drugs that prolong the QTc. (421 here, ok)  Hyper- and hypoglycemia associated with oral diabetic agents-- not an issue here  Tendinopathy, both during or after therapy.   Neuropathy.  Aortic dissection.  Potential unmasking or exacerbation of myasthenia gravis.     Diarrhea, GI distress, colitis (including C. difficile).     Rarely, CNS side effects from lightheadedness, to confusion and even seizures or psychosis.     Avoid concomitant oral administration of polyvalent cations (e.g. Ca, Mg, Al, Zn, Fe) with oral levofloxacin as they could chelate the antibiotic and prevent its absorption.     Patient voiced understanding.  1/29: Minimal CVA tenderness, no other concerns for active or uncontrolled infection on the basis of examination.  Tolerating levofloxacin without issues.  I do not see a role to alter therapy at this point in time.    Suggestions  Plan for 3-week course of levofloxacin  Outpatient follow-up with urology  Happy to see patient in the office in follow-up, she has my card  Reviewed with housestaff  I will sign off at this point in time  Thank you for the courtesy of this referral    Nicholas Meadows MD  Attending Physician  [facility]  Division of Infectious Diseases  [phone]  
Recurrent L pyelonephritis in 33F without other medical history.   Possible evolving abscess, but appears to be making progress regardless.   Abscesses <5cm frequently respond well to antibiotics.   Only minimal CVAT presently  Current antibiotics adequate, I see role to alter Rx at this time    1/28: Improved. . Reviewed with patient treatment options. Levofloxacin remains a good option.   Levofloxacin   Oral bioavailability is excellent (99%).   Remain mindful of potential adverse events of fluoroquinolones including but not limited to      Potentiation  of coumadin effect. - not an issue here  Prolongation of QT interval, especially with other drugs that prolong the QTc. (421 here, ok)  Hyper- and hypoglycemia associated with oral diabetic agents-- not an issue here  Tendinopathy, both during or after therapy.   Neuropathy.  Aortic dissection.  Potential unmasking or exacerbation of myasthenia gravis.     Diarrhea, GI distress, colitis (including C. difficile).     Rarely, CNS side effects from lightheadedness, to confusion and even seizures or psychosis.     Avoid concomitant oral administration of polyvalent cations (e.g. Ca, Mg, Al, Zn, Fe) with oral levofloxacin as they could chelate the antibiotic and prevent its absorption.     Patient voiced understanding.    Suggestions--  Continue ceftriaxone for now  Serial exams  While there is not 'gold standard' with respect to treatment of renal abscesses, treatment is typically more prolonged.   OPD urology follow up to assess for any functional or anatomic abnormality appropriate  Timing of repeat imaging as per urology  When otherwise ready for discharge, levofloxacin reasonable as above    Nicholas Meadows MD  Attending Physician  [facility]  Division of Infectious Diseases  [phone]  
33 F w/ PMHx of UTI (ESBL E. Coli in 2019, pansensitive E. Coli in Dec 23') p/w severe sepsis 2/2 UTI

## 2024-01-29 NOTE — PROGRESS NOTE ADULT - SUBJECTIVE AND OBJECTIVE BOX
[facility]  Division of Infectious Diseases  [phone]    Name: CANDIDO SINGH  Age: 33y  Gender: Female  MRN: [id number]    Interval History--  Notes reviewed.     Past Medical History--  No pertinent past medical history    Numerous skin moles    UTI (urinary tract infection)    No pertinent past medical history    E-coli UTI    S/P     History of         For details regarding the patient's social history, family history, and other miscellaneous elements, please refer the initial infectious diseases consultation and/or the admitting history and physical examination for this admission.    Allergies    No Known Allergies    Intolerances        Medications--  Antibiotics:  levoFLOXacin  Tablet 750 milliGRAM(s) Oral every 24 hours    Immunologic:    Other:  acetaminophen   IVPB .. PRN  guaiFENesin Oral Liquid (Sugar-Free) PRN  omega-3-Acid Ethyl Esters      Review of Systems--  A 10-point review of systems was obtained.     Pertinent positives and negatives--  Constitutional: No fevers. No Chills. No Rigors.   Cardiovascular: No chest pain. No palpitations.  Respiratory: No shortness of breath. No cough.  Gastrointestinal: No nausea or vomiting. No diarrhea or constipation.   Psychiatric: Pleasant. Appropriate affect.    Review of systems otherwise negative except as previously noted.    Physical Examination--  Vital Signs: T(F): 97.6 (24 @ 05:43), Max: 98.4 (24 @ 13:58)  HR: 78 (24 @ 05:43)  BP: 100/60 (24 @ 05:43)  RR: 18 (24 @ 05:43)  SpO2: 99% (24 @ 05:43)  Wt(kg): --  General: Nontoxic-appearing Female in no acute distress.  HEENT: AT/NC. PERRL. EOMI. Anicteric. Conjunctiva pink and moist. Oropharynx clear. Dentition fair.  Neck: Not rigid. No sense of mass.  Nodes: None palpable.  Lungs: Clear bilaterally without rales, wheezing or rhonchi  Heart: Regular rate and rhythm. No Murmur. No rub. No gallop. No palpable thrill.  Abdomen: Bowel sounds present and normoactive. Soft. Nondistended. Nontender. No sense of mass. No organomegaly.  Back: No spinal tenderness. No costovertebral angle tenderness.   Extremities: No cyanosis or clubbing. No edema.   Skin: Warm. Dry. Good turgor. No rash. No vasculitic stigmata.  Psychiatric: Appropriate affect and mood for situation.         Laboratory Studies--  CBC                        11.6   6.91  )-----------( 323      ( 2024 06:37 )             35.1       Chemistries      141  |  104  |  6<L>  ----------------------------<  84  4.4   |  23  |  0.56    Ca    8.9      2024 06:37  Phos  3.3       Mg     2.20             Culture Data    Culture - Blood (collected 2024 17:32)  Source: .Blood Blood-Venous  Preliminary Report (2024 19:01):    No growth at 4 days    Culture - Blood (collected 2024 17:32)  Source: .Blood Blood-Peripheral  Preliminary Report (2024 19:01):    No growth at 4 days    Culture - Urine (collected 2024 11:24)  Source: Clean Catch Clean Catch (Midstream)  Final Report (2024 09:57):    >100,000 CFU/ml Escherichia coli  Organism: Escherichia coli (2024 09:57)  Organism: Escherichia coli (2024 09:57)             [facility]  Division of Infectious Diseases  [phone]    Name: CANDIDO SINGH  Age: 33y  Gender: Female  MRN: [id number]    Interval History--  Notes reviewed. Seen earlier today.  Has minimal pain.  Tolerating p.o. without incident.  Feels like her legs are heavy at times.  Denies any fevers chills or rigors.  No other complaints.    Past Medical History--  No pertinent past medical history    Numerous skin moles    UTI (urinary tract infection)    No pertinent past medical history    E-coli UTI    S/P     History of         For details regarding the patient's social history, family history, and other miscellaneous elements, please refer the initial infectious diseases consultation and/or the admitting history and physical examination for this admission.    Allergies    No Known Allergies    Intolerances        Medications--  Antibiotics:  levoFLOXacin  Tablet 750 milliGRAM(s) Oral every 24 hours    Immunologic:    Other:  acetaminophen   IVPB .. PRN  guaiFENesin Oral Liquid (Sugar-Free) PRN  omega-3-Acid Ethyl Esters      Review of Systems--  A 10-point review of systems was obtained.   Review of systems otherwise negative except as previously noted.    Physical Examination--  Vital Signs: T(F): 97.6 (24 @ 05:43), Max: 98.4 (24 @ 13:58)  HR: 78 (24 @ 05:43)  BP: 100/60 (24 @ 05:43)  RR: 18 (24 @ 05:43)  SpO2: 99% (24 @ 05:43)  Wt(kg): --  General: Nontoxic-appearing Female in no acute distress.  HEENT: AT/NC.  Anicteric. Conjunctiva pink and moist. Oropharynx clear.   Neck: Not rigid. No sense of mass.  Nodes: None palpable.  Lungs: Clear bilaterally  Heart: Regular rate and rhythm.   Abdomen: Bowel sounds present and normoactive. Soft. Nondistended. Nontender.  Back: No spinal tenderness. L costovertebral angle tenderness improved but still present.   Extremities: No cyanosis or clubbing. No edema.   Skin: Warm. Dry. Good turgor. No rash. No vasculitic stigmata. Innumerable moles.  Psychiatric: Appropriate affect and mood for situation.         Laboratory Studies--  CBC                        11.6   6.91  )-----------( 323      ( 2024 06:37 )             35.1       Chemistries      141  |  104  |  6<L>  ----------------------------<  84  4.4   |  23  |  0.56    Ca    8.9      2024 06:37  Phos  3.3       Mg     2.20             Culture Data    Culture - Blood (collected 2024 17:32)  Source: .Blood Blood-Venous  Preliminary Report (2024 19:01):    No growth at 4 days    Culture - Blood (collected 2024 17:32)  Source: .Blood Blood-Peripheral  Preliminary Report (2024 19:01):    No growth at 4 days    Culture - Urine (collected 2024 11:24)  Source: Clean Catch Clean Catch (Midstream)  Final Report (2024 09:57):    >100,000 CFU/ml Escherichia coli  Organism: Escherichia coli (2024 09:57)  Organism: Escherichia coli (2024 09:57)

## 2024-01-29 NOTE — PROGRESS NOTE ADULT - PROBLEM SELECTOR PLAN 3
- DVT: Not indicated in this patient (Improv VTE and [place] scores low)  - Diet: Regular  - Dispo: Home
- DVT: Not indicated in this patient (Improv VTE and [place] scores low)  - Diet: Regular  - Dispo: Home
- DVT: Not indicated in this patient (Improv VTE and [place] scores low)  - Diet: Regular  - Dispo: Home
- DVT: Not indicated in this patient (Improv VTE and [place] scores low)  - Diet: Regular  - Dispo: Home
- DVT: Not indicated in this patient (Improv VTE and [place] scores low)  - Diet: Regular  - Dispo: Home

## 2024-01-29 NOTE — PROGRESS NOTE ADULT - PROBLEM SELECTOR PLAN 1
- Patient arrived to ED with , /63, Oral temp 99F  - s/p 1L IVF bolus x2, Zofran, CTX in ED  - Leukocytosis of 13.53 and Lactate 2.2  - UA grossly positive    Plan:  - c/w Levofloxacin  - UCx --> E. Coli  - BCx --> NGTD  - CT-A/P 1/26: Evolving L renal abscess --> Urology c/s'd, ID c/s'd  	- Urology recs:   		- Continue antibiotics  		- Renal US in 1 week to evaluate L kidney  		- Consider ID consult to guide antibiotic treatment given previous UTI in December  		- Recommend PVR to ensure complete bladder emptying  	- ID recs:  		- Timing of repeat imaging as per urology  		- d/c w/ Levofloxacin may represent a good option

## 2024-01-29 NOTE — DISCHARGE NOTE NURSING/CASE MANAGEMENT/SOCIAL WORK - NSDCPEFALRISK_GEN_ALL_CORE
For information on Fall & Injury Prevention, visit: [email or web address] OR  [email or web address] OR  https://www.cdc.gov/steadi/patient.html

## 2024-01-29 NOTE — PROGRESS NOTE ADULT - PROBLEM SELECTOR PLAN 2
- ESBL UTI in 2019  - Pansensitive E. Coli UTI in Dec 23'    Plan:  - As above

## 2024-01-29 NOTE — PROGRESS NOTE ADULT - PROVIDER SPECIALTY LIST ADULT
Infectious Disease
Infectious Disease
Urology
Internal Medicine
Urology
Internal Medicine

## 2024-01-30 PROBLEM — N39.0 URINARY TRACT INFECTION, SITE NOT SPECIFIED: Chronic | Status: ACTIVE | Noted: 2024-01-24

## 2024-02-01 ENCOUNTER — APPOINTMENT (OUTPATIENT)
Dept: UROLOGY | Facility: CLINIC | Age: 34
End: 2024-02-01
Payer: COMMERCIAL

## 2024-02-01 VITALS
HEIGHT: 63 IN | RESPIRATION RATE: 16 BRPM | DIASTOLIC BLOOD PRESSURE: 65 MMHG | BODY MASS INDEX: 19.84 KG/M2 | SYSTOLIC BLOOD PRESSURE: 96 MMHG | OXYGEN SATURATION: 99 % | TEMPERATURE: 97.9 F | HEART RATE: 84 BPM | WEIGHT: 112 LBS

## 2024-02-01 DIAGNOSIS — N12 TUBULO-INTERSTITIAL NEPHRITIS, NOT SPECIFIED AS ACUTE OR CHRONIC: ICD-10-CM

## 2024-02-01 PROCEDURE — 99204 OFFICE O/P NEW MOD 45 MIN: CPT

## 2024-02-02 PROBLEM — N12 PYELONEPHRITIS: Status: ACTIVE | Noted: 2024-02-02

## 2024-02-02 NOTE — END OF VISIT
[Time Spent: ___ minutes] : I have spent [unfilled] minutes of time on the encounter. [FreeTextEntry4] : This note was written by Aubree Ward on 02/01/2024 actively solely Monty Goode M.D.   All medical record entries made by this scribe at my, Monty Goode M.D. direction and personally dictated by me on 02/01/2024. I have personally reviewed the chart and agree that the record reflects my personal performance of the history, physical exam, assessment, and plan.

## 2024-02-02 NOTE — ASSESSMENT
[FreeTextEntry1] : 33 year old female with f/u from hospital. Reviewed 12/2023 CT scan images . Pyelonephritis noted  Reviewed 01/2024 repeat CT scan images  Pyelonephritis noted  possible small abscess ucx ecoli sens tolevaquin Advised pt to continue abx levaquin  and have a f/u U/S in 1 week. Also advised pt to call office if sx return. unknown no

## 2024-02-02 NOTE — HISTORY OF PRESENT ILLNESS
[FreeTextEntry1] :  cc hosp [facility]    33 year old female presents for f/u from hospital. Pt reports she was in the hospital from 01/24/24 - 01/29/24 due to experiencing cloudy urine, fever, and back pain that radiated to her legs. She denies having dysuria just cloudy urine While in the hospital, she tested positive for UTI and a possible abscess was noted. Pt reports she was prescribed abx and has been taking them since 01/29/24. She states she is concerned about abscess and states she was told that she needs another U/S. Pt currently c/o persisting left flank pain and fatigue. She denies currently having a fever. Pt also reports knee joint pain that started a month ago.  Pt reports she was previously in the hospital in 12/2023 and 2019 due to infections. She denies h/o infections as a child.  Pt denies any medical problems.  - Subjective and Objective: HPI 33 F w/ PMHx of UTI (ESBL E. Coli in 2019, pansensitive E. Coli in Dec 23') p/w 2 days of R flank pain, dysuria, fevers, cloudy/foul smelling urine. She that it feels like UTIs she has had in the past. She also has nausea today but no vomiting. Patient states that on 1/21 she began to feel unwell, and on 1/22 she began to have fever, body ache, R flank pain, and noted that her urine was cloudy. Symptoms continued to progress and patient reports fevers at home up to 103. Due to worsenin condition and prior experience with UTIs, patient decided to come to the ED. She denies any headache, dizziness, abdominal pain, diarrhea, or rashes. Currently endorses R flank pain, dysuria, R knee pain persistent since November). She does note that R flank pain has been present since last admission in December, and is unchanged.   ED course: Patient arrived to ED with , /63, Oral temp 99F, saturating well on RA. Pt received 1L IVF bolus x2, Zofran, CTX. UA was grossly positive. Blood work showed Leukocytosis of 13.53 and Lactate 2.2.   Urology consulted for developing abscess. Patient has had UTIs in past, including in December for which she was admitted for pyelonephritis. Urine culture in December was positive for E. coli (R to Bactrim). Urine culture during this admission also positive for E. coli (pan-sensitive). Denies dysuria, gross hematuria, incomplete emptying. Reports L flank pain that has been improving.  ct  KIDNEYS/URETERS: Multiple segment foci of hypoperfusion left kidney consistent with pyelonephritis. New tubular low attenuation mid anterior lower pole focus suggesting evolving abscess. (2:62)  BLADDER: Within normal limits. REPRODUCTIVE ORGANS: Uterus and adnexa within normal limits. Indwelling IUD.

## 2024-02-05 LAB
APPEARANCE: CLEAR
BACTERIA UR CULT: NORMAL
BACTERIA: NEGATIVE /HPF
BILIRUBIN URINE: NEGATIVE
BLOOD URINE: NEGATIVE
CAST: 0 /LPF
COLOR: YELLOW
EPITHELIAL CELLS: 8 /HPF
GLUCOSE QUALITATIVE U: NEGATIVE MG/DL
KETONES URINE: NEGATIVE MG/DL
LEUKOCYTE ESTERASE URINE: ABNORMAL
MICROSCOPIC-UA: NORMAL
NITRITE URINE: NEGATIVE
PH URINE: 6
PROTEIN URINE: NEGATIVE MG/DL
RED BLOOD CELLS URINE: 1 /HPF
SPECIFIC GRAVITY URINE: 1.01
UROBILINOGEN URINE: 0.2 MG/DL
WHITE BLOOD CELLS URINE: 2 /HPF

## 2024-02-14 ENCOUNTER — APPOINTMENT (OUTPATIENT)
Dept: UROLOGY | Facility: CLINIC | Age: 34
End: 2024-02-14
Payer: COMMERCIAL

## 2024-02-14 VITALS
WEIGHT: 113 LBS | HEIGHT: 63 IN | DIASTOLIC BLOOD PRESSURE: 65 MMHG | SYSTOLIC BLOOD PRESSURE: 88 MMHG | BODY MASS INDEX: 20.02 KG/M2 | TEMPERATURE: 97.5 F | HEART RATE: 78 BPM | OXYGEN SATURATION: 99 %

## 2024-02-14 PROCEDURE — 76775 US EXAM ABDO BACK WALL LIM: CPT

## 2024-02-14 PROCEDURE — 99213 OFFICE O/P EST LOW 20 MIN: CPT | Mod: 25

## 2024-02-14 NOTE — HISTORY OF PRESENT ILLNESS
[FreeTextEntry1] :  cc hosp [facility]    33 year old female presents for f/u from hospital. Pt reports she was in the hospital from 01/24/24 - 01/29/24 due to experiencing cloudy urine, fever, and back pain that radiated to her legs. She denies having dysuria just cloudy urine While in the hospital, she tested positive for UTI and a possible abscess was noted. Pt reports she was prescribed abx and has been taking them since 01/29/24. She states she is concerned about abscess and states she was told that she needs another U/S. Pt currently c/o persisting left flank pain and fatigue. She denies currently having a fever. Pt also reports knee joint pain that started a month ago.  Pt reports she was previously in the hospital in 12/2023 and 2019 due to infections. She denies h/o infections as a child.  Pt denies any medical problems.   ct  KIDNEYS/URETERS: Multiple segment foci of hypoperfusion left kidney consistent with pyelonephritis. New tubular low attenuation mid anterior lower pole focus suggesting evolving abscess. (2:62)  BLADDER: Within normal limits. REPRODUCTIVE ORGANS: Uterus and adnexa within normal limits. Indwelling IUD.

## 2024-02-14 NOTE — ASSESSMENT
[FreeTextEntry1] : doing well sono images reviewed - no abscess seen  will get f/u in 3 weeks call for any symptoms

## 2024-03-06 ENCOUNTER — APPOINTMENT (OUTPATIENT)
Dept: UROLOGY | Facility: CLINIC | Age: 34
End: 2024-03-06

## 2024-04-17 ENCOUNTER — APPOINTMENT (OUTPATIENT)
Dept: UROLOGY | Facility: CLINIC | Age: 34
End: 2024-04-17
Payer: COMMERCIAL

## 2024-04-17 VITALS
HEART RATE: 84 BPM | WEIGHT: 116 LBS | SYSTOLIC BLOOD PRESSURE: 94 MMHG | TEMPERATURE: 97.5 F | HEIGHT: 63 IN | BODY MASS INDEX: 20.55 KG/M2 | DIASTOLIC BLOOD PRESSURE: 65 MMHG | OXYGEN SATURATION: 98 %

## 2024-04-17 DIAGNOSIS — N15.1 RENAL AND PERINEPHRIC ABSCESS: ICD-10-CM

## 2024-04-17 PROCEDURE — 76775 US EXAM ABDO BACK WALL LIM: CPT

## 2024-04-17 PROCEDURE — 99213 OFFICE O/P EST LOW 20 MIN: CPT | Mod: 25

## 2024-04-19 LAB
APPEARANCE: CLEAR
BACTERIA: ABNORMAL /HPF
BILIRUBIN URINE: NEGATIVE
BLOOD URINE: ABNORMAL
CAST: 1 /LPF
COLOR: YELLOW
EPITHELIAL CELLS: 1 /HPF
GLUCOSE QUALITATIVE U: NEGATIVE MG/DL
KETONES URINE: NEGATIVE MG/DL
LEUKOCYTE ESTERASE URINE: ABNORMAL
MICROSCOPIC-UA: NORMAL
NITRITE URINE: POSITIVE
PH URINE: 6
PROTEIN URINE: NEGATIVE MG/DL
RED BLOOD CELLS URINE: 0 /HPF
REVIEW: NORMAL
SPECIFIC GRAVITY URINE: 1.01
UROBILINOGEN URINE: 0.2 MG/DL
WHITE BLOOD CELLS URINE: 4 /HPF

## 2024-04-21 PROBLEM — N15.1 RENAL ABSCESS: Status: ACTIVE | Noted: 2024-02-02

## 2024-04-21 LAB — BACTERIA UR CULT: ABNORMAL

## 2024-04-21 RX ORDER — CEFUROXIME AXETIL 500 MG/1
500 TABLET ORAL
Qty: 14 | Refills: 0 | Status: ACTIVE | COMMUNITY
Start: 2024-04-21 | End: 1900-01-01

## 2024-07-17 ENCOUNTER — APPOINTMENT (OUTPATIENT)
Dept: UROLOGY | Facility: CLINIC | Age: 34
End: 2024-07-17

## 2024-08-29 ENCOUNTER — EMERGENCY (EMERGENCY)
Facility: HOSPITAL | Age: 34
LOS: 1 days | Discharge: HOME | End: 2024-08-29
Admitting: EMERGENCY MEDICINE
Payer: COMMERCIAL

## 2024-08-29 VITALS
DIASTOLIC BLOOD PRESSURE: 70 MMHG | WEIGHT: 113.98 LBS | TEMPERATURE: 99 F | OXYGEN SATURATION: 98 % | HEART RATE: 115 BPM | RESPIRATION RATE: 20 BRPM | SYSTOLIC BLOOD PRESSURE: 104 MMHG

## 2024-08-29 VITALS — TEMPERATURE: 99 F

## 2024-08-29 DIAGNOSIS — Z98.891 HISTORY OF UTERINE SCAR FROM PREVIOUS SURGERY: Chronic | ICD-10-CM

## 2024-08-29 LAB
ALBUMIN SERPL ELPH-MCNC: 4.2 G/DL — SIGNIFICANT CHANGE UP (ref 3.3–5)
ALP SERPL-CCNC: 62 U/L — SIGNIFICANT CHANGE UP (ref 40–120)
ALT FLD-CCNC: 13 U/L — SIGNIFICANT CHANGE UP (ref 4–33)
ANION GAP SERPL CALC-SCNC: 13 MMOL/L — SIGNIFICANT CHANGE UP (ref 7–14)
APPEARANCE UR: ABNORMAL
AST SERPL-CCNC: 17 U/L — SIGNIFICANT CHANGE UP (ref 4–32)
BACTERIA # UR AUTO: ABNORMAL /HPF
BASOPHILS # BLD AUTO: 0.03 K/UL — SIGNIFICANT CHANGE UP (ref 0–0.2)
BASOPHILS NFR BLD AUTO: 0.3 % — SIGNIFICANT CHANGE UP (ref 0–2)
BILIRUB SERPL-MCNC: 0.3 MG/DL — SIGNIFICANT CHANGE UP (ref 0.2–1.2)
BILIRUB UR-MCNC: NEGATIVE — SIGNIFICANT CHANGE UP
BLOOD GAS VENOUS COMPREHENSIVE RESULT: SIGNIFICANT CHANGE UP
BUN SERPL-MCNC: 8 MG/DL — SIGNIFICANT CHANGE UP (ref 7–23)
CALCIUM SERPL-MCNC: 8.7 MG/DL — SIGNIFICANT CHANGE UP (ref 8.4–10.5)
CAST: 0 /LPF — SIGNIFICANT CHANGE UP (ref 0–4)
CHLORIDE SERPL-SCNC: 102 MMOL/L — SIGNIFICANT CHANGE UP (ref 98–107)
CO2 SERPL-SCNC: 22 MMOL/L — SIGNIFICANT CHANGE UP (ref 22–31)
COLOR SPEC: YELLOW — SIGNIFICANT CHANGE UP
CREAT SERPL-MCNC: 0.7 MG/DL — SIGNIFICANT CHANGE UP (ref 0.5–1.3)
DIFF PNL FLD: ABNORMAL
EGFR: 116 ML/MIN/1.73M2 — SIGNIFICANT CHANGE UP
EGFR: 116 ML/MIN/1.73M2 — SIGNIFICANT CHANGE UP
EOSINOPHIL # BLD AUTO: 0.02 K/UL — SIGNIFICANT CHANGE UP (ref 0–0.5)
EOSINOPHIL NFR BLD AUTO: 0.2 % — SIGNIFICANT CHANGE UP (ref 0–6)
GLUCOSE SERPL-MCNC: 95 MG/DL — SIGNIFICANT CHANGE UP (ref 70–99)
GLUCOSE UR QL: 100 MG/DL
HCT VFR BLD CALC: 38 % — SIGNIFICANT CHANGE UP (ref 34.5–45)
HGB BLD-MCNC: 12.2 G/DL — SIGNIFICANT CHANGE UP (ref 11.5–15.5)
IANC: 5.73 K/UL — SIGNIFICANT CHANGE UP (ref 1.8–7.4)
IMM GRANULOCYTES NFR BLD AUTO: 0.2 % — SIGNIFICANT CHANGE UP (ref 0–0.9)
KETONES UR-MCNC: ABNORMAL MG/DL
LEUKOCYTE ESTERASE UR-ACNC: ABNORMAL
LYMPHOCYTES # BLD AUTO: 1.56 K/UL — SIGNIFICANT CHANGE UP (ref 1–3.3)
LYMPHOCYTES # BLD AUTO: 18 % — SIGNIFICANT CHANGE UP (ref 13–44)
MCHC RBC-ENTMCNC: 27.4 PG — SIGNIFICANT CHANGE UP (ref 27–34)
MCHC RBC-ENTMCNC: 32.1 GM/DL — SIGNIFICANT CHANGE UP (ref 32–36)
MCV RBC AUTO: 85.2 FL — SIGNIFICANT CHANGE UP (ref 80–100)
MONOCYTES # BLD AUTO: 1.29 K/UL — HIGH (ref 0–0.9)
MONOCYTES NFR BLD AUTO: 14.9 % — HIGH (ref 2–14)
NEUTROPHILS # BLD AUTO: 5.73 K/UL — SIGNIFICANT CHANGE UP (ref 1.8–7.4)
NEUTROPHILS NFR BLD AUTO: 66.4 % — SIGNIFICANT CHANGE UP (ref 43–77)
NITRITE UR-MCNC: NEGATIVE — SIGNIFICANT CHANGE UP
NRBC # BLD AUTO: 0 K/UL — SIGNIFICANT CHANGE UP (ref 0–0)
NRBC # BLD: 0 /100 WBCS — SIGNIFICANT CHANGE UP (ref 0–0)
NRBC # FLD: 0 K/UL — SIGNIFICANT CHANGE UP (ref 0–0)
NRBC BLD-RTO: 0 /100 WBCS — SIGNIFICANT CHANGE UP (ref 0–0)
PH UR: 6 — SIGNIFICANT CHANGE UP (ref 5–8)
PLATELET # BLD AUTO: 241 K/UL — SIGNIFICANT CHANGE UP (ref 150–400)
POTASSIUM SERPL-MCNC: 3.7 MMOL/L — SIGNIFICANT CHANGE UP (ref 3.5–5.3)
POTASSIUM SERPL-SCNC: 3.7 MMOL/L — SIGNIFICANT CHANGE UP (ref 3.5–5.3)
PROT SERPL-MCNC: 7.4 G/DL — SIGNIFICANT CHANGE UP (ref 6–8.3)
PROT UR-MCNC: 30 MG/DL
RBC # BLD: 4.46 M/UL — SIGNIFICANT CHANGE UP (ref 3.8–5.2)
RBC # FLD: 13.7 % — SIGNIFICANT CHANGE UP (ref 10.3–14.5)
RBC CASTS # UR COMP ASSIST: 1003 /HPF — HIGH (ref 0–4)
SODIUM SERPL-SCNC: 137 MMOL/L — SIGNIFICANT CHANGE UP (ref 135–145)
SP GR SPEC: 1.02 — SIGNIFICANT CHANGE UP (ref 1–1.03)
SQUAMOUS # UR AUTO: 5 /HPF — SIGNIFICANT CHANGE UP (ref 0–5)
UROBILINOGEN FLD QL: 1 MG/DL — SIGNIFICANT CHANGE UP (ref 0.2–1)
WBC # BLD: 8.65 K/UL — SIGNIFICANT CHANGE UP (ref 3.8–10.5)
WBC # FLD AUTO: 8.65 K/UL — SIGNIFICANT CHANGE UP (ref 3.8–10.5)
WBC UR QL: 19 /HPF — HIGH (ref 0–5)

## 2024-08-29 PROCEDURE — 99285 EMERGENCY DEPT VISIT HI MDM: CPT

## 2024-08-29 PROCEDURE — 93010 ELECTROCARDIOGRAM REPORT: CPT

## 2024-08-29 RX ORDER — CEFUROXIME SODIUM 1.5 G
1 VIAL (EA) INJECTION
Qty: 14 | Refills: 0
Start: 2024-08-29 | End: 2024-09-04

## 2024-08-29 RX ORDER — CEFTRIAXONE 500 MG/1
1000 INJECTION, POWDER, FOR SOLUTION INTRAMUSCULAR; INTRAVENOUS ONCE
Refills: 0 | Status: COMPLETED | OUTPATIENT
Start: 2024-08-29 | End: 2024-08-29

## 2024-08-29 RX ORDER — ACETAMINOPHEN 500 MG/5ML
1000 LIQUID (ML) ORAL ONCE
Refills: 0 | Status: COMPLETED | OUTPATIENT
Start: 2024-08-29 | End: 2024-08-29

## 2024-08-29 RX ADMIN — Medication 1000 MILLIGRAM(S): at 10:33

## 2024-08-29 RX ADMIN — Medication 400 MILLIGRAM(S): at 10:15

## 2024-08-29 RX ADMIN — Medication 1000 MILLILITER(S): at 10:15

## 2024-08-29 RX ADMIN — CEFTRIAXONE 100 MILLIGRAM(S): 500 INJECTION, POWDER, FOR SOLUTION INTRAMUSCULAR; INTRAVENOUS at 10:31

## 2024-09-02 LAB
-  AMOXICILLIN/CLAVULANIC ACID: SIGNIFICANT CHANGE UP
-  AMPICILLIN/SULBACTAM: SIGNIFICANT CHANGE UP
-  AMPICILLIN: SIGNIFICANT CHANGE UP
-  AZTREONAM: SIGNIFICANT CHANGE UP
-  CEFAZOLIN: SIGNIFICANT CHANGE UP
-  CEFEPIME: SIGNIFICANT CHANGE UP
-  CEFOXITIN: SIGNIFICANT CHANGE UP
-  CEFTRIAXONE: SIGNIFICANT CHANGE UP
-  CEFUROXIME: SIGNIFICANT CHANGE UP
-  CIPROFLOXACIN: SIGNIFICANT CHANGE UP
-  ERTAPENEM: SIGNIFICANT CHANGE UP
-  GENTAMICIN: SIGNIFICANT CHANGE UP
-  IMIPENEM: SIGNIFICANT CHANGE UP
-  LEVOFLOXACIN: SIGNIFICANT CHANGE UP
-  MEROPENEM: SIGNIFICANT CHANGE UP
-  NITROFURANTOIN: SIGNIFICANT CHANGE UP
-  PIPERACILLIN/TAZOBACTAM: SIGNIFICANT CHANGE UP
-  TOBRAMYCIN: SIGNIFICANT CHANGE UP
-  TRIMETHOPRIM/SULFAMETHOXAZOLE: SIGNIFICANT CHANGE UP
CULTURE RESULTS: ABNORMAL
METHOD TYPE: SIGNIFICANT CHANGE UP
ORGANISM # SPEC MICROSCOPIC CNT: ABNORMAL
ORGANISM # SPEC MICROSCOPIC CNT: ABNORMAL
SPECIMEN SOURCE: SIGNIFICANT CHANGE UP

## 2024-09-04 ENCOUNTER — APPOINTMENT (OUTPATIENT)
Dept: UROLOGY | Facility: CLINIC | Age: 34
End: 2024-09-04

## 2024-09-04 VITALS
HEART RATE: 74 BPM | DIASTOLIC BLOOD PRESSURE: 69 MMHG | OXYGEN SATURATION: 98 % | SYSTOLIC BLOOD PRESSURE: 101 MMHG | TEMPERATURE: 97.6 F

## 2024-09-04 PROCEDURE — 99214 OFFICE O/P EST MOD 30 MIN: CPT | Mod: 25

## 2024-09-04 PROCEDURE — 76775 US EXAM ABDO BACK WALL LIM: CPT

## 2024-09-04 RX ORDER — FLUCONAZOLE 150 MG/1
150 TABLET ORAL
Qty: 1 | Refills: 0 | Status: ACTIVE | COMMUNITY
Start: 2024-09-04 | End: 1900-01-01

## 2024-09-04 RX ORDER — NITROFURANTOIN (MONOHYDRATE/MACROCRYSTALS) 25; 75 MG/1; MG/1
100 CAPSULE ORAL
Qty: 30 | Refills: 0 | Status: ACTIVE | COMMUNITY
Start: 2024-09-04 | End: 1900-01-01

## 2024-09-04 NOTE — ASSESSMENT
[FreeTextEntry1] : 33 year old female with hospital f/u. Reviewed Renal U/S images with pt. Unremarkable exam. Both kidneys are normal in size and echogenicity without hydronephrosis, stones or solid masses visualized.  Rx for Fluconazole given today. Rx for Nitrofurantoin given today.

## 2024-09-04 NOTE — END OF VISIT
[FreeTextEntry4] : This note was written by Aubree Ward on 09/04/2024 actively solely Monty Goode M.D. I, Aubree Ward, am scribing for and in the presence of Monty Goode M.D. in the following sections HISTORY OF PRESENT ILLNESS, PAST MEDICAL/FAMILY/SOCIAL HISTORY; REVIEW OF SYSTEMS; VITAL SIGNS; PHYSICAL EXAM; ASSESSMENT/PLAN.   All medical record entries made by this scribe at my, Monty Godoe M.D. direction and personally dictated by me on 09/04/2024. I personally performed the services described in the documentation, reviewed the documentation recorded by the scribe in my presence, and it accurately and completely records my words and actions.

## 2024-09-04 NOTE — HISTORY OF PRESENT ILLNESS
[FreeTextEntry1] : 04/17/2024 [facility] fu  33 year old female presents for f/u from hospital. Pt reports she was in the hospital from 01/24/24 - 01/29/24 due to experiencing cloudy urine, fever, and back pain that radiated to her legs. She denies having dysuria just cloudy urine While in the hospital, she tested positive for UTI and a possible abscess was noted. Pt reports she was prescribed abx and has been taking them since 01/29/24. She states she is concerned about abscess and states she was told that she needs another U/S. Pt currently c/o persisting left flank pain and fatigue. She denies currently having a fever. Pt also reports knee joint pain that started a month ago.  Pt reports she was previously in the hospital in 12/2023 and 2019 due to infections. She denies h/o infections as a child.  Pt denies any medical problems.  CT Impressions: KIDNEYS/URETERS: Multiple segment foci of hypoperfusion left kidney consistent with pyelonephritis. New tubular low attenuation mid anterior lower pole focus suggesting evolving abscess. (2:62)  BLADDER: Within normal limits. REPRODUCTIVE ORGANS: Uterus and adnexa within normal limits. Indwelling IUD.  09/04/2024 [facility] F/U  33 year old presents for a hospital f/u. Pt reports she had a fever with back pain, so she went to the hospital on 08/29/24 where it was found that she had a UTI. She states she was given 500 Cifran. Pt reports she no longer has a fever, but she does still have some pain. She states she didn't have any imaging done. Pt reports she has had 3 infections since 12/2023 and is unsure whether it is related to intercourse. She states for her most recent UTI she was sexually active with her  that Tuesday, started having sx Wednesday, and by Thursday she was diagnosed with an infection. Pt reports she has been evaluated by her GYN and everything was fine.  09/04/24 Renal U/S Findings: Unremarkable exam. Both kidneys are normal in size and echogenicity without hydronephrosis, stones or solid masses visualized.

## 2024-09-16 PROBLEM — N39.0 BACTERIAL UTI: Status: ACTIVE | Noted: 2024-09-16 | Resolved: 2024-10-16

## 2024-11-14 ENCOUNTER — EMERGENCY (EMERGENCY)
Facility: HOSPITAL | Age: 34
LOS: 1 days | Discharge: HOME | End: 2024-11-14
Attending: EMERGENCY MEDICINE | Admitting: EMERGENCY MEDICINE
Payer: COMMERCIAL

## 2024-11-14 VITALS
RESPIRATION RATE: 18 BRPM | TEMPERATURE: 98 F | SYSTOLIC BLOOD PRESSURE: 95 MMHG | HEART RATE: 100 BPM | HEIGHT: 63 IN | OXYGEN SATURATION: 99 % | WEIGHT: 113.98 LBS | DIASTOLIC BLOOD PRESSURE: 60 MMHG

## 2024-11-14 DIAGNOSIS — Z98.891 HISTORY OF UTERINE SCAR FROM PREVIOUS SURGERY: Chronic | ICD-10-CM

## 2024-11-14 LAB
ADD ON TEST-SPECIMEN IN LAB: SIGNIFICANT CHANGE UP
ALBUMIN SERPL ELPH-MCNC: 4.5 G/DL — SIGNIFICANT CHANGE UP (ref 3.3–5)
ALP SERPL-CCNC: 66 U/L — SIGNIFICANT CHANGE UP (ref 40–120)
ALT FLD-CCNC: 23 U/L — SIGNIFICANT CHANGE UP (ref 4–33)
ANION GAP SERPL CALC-SCNC: 16 MMOL/L — HIGH (ref 7–14)
APPEARANCE UR: CLEAR — SIGNIFICANT CHANGE UP
APPEARANCE UR: CLEAR — SIGNIFICANT CHANGE UP
APTT BLD: 30.5 SEC — SIGNIFICANT CHANGE UP (ref 24.5–35.6)
AST SERPL-CCNC: 25 U/L — SIGNIFICANT CHANGE UP (ref 4–32)
BACTERIA # UR AUTO: NEGATIVE /HPF — SIGNIFICANT CHANGE UP
BASOPHILS # BLD AUTO: 0.03 K/UL — SIGNIFICANT CHANGE UP (ref 0–0.2)
BASOPHILS NFR BLD AUTO: 0.3 % — SIGNIFICANT CHANGE UP (ref 0–2)
BILIRUB SERPL-MCNC: 0.4 MG/DL — SIGNIFICANT CHANGE UP (ref 0.2–1.2)
BILIRUB UR-MCNC: NEGATIVE — SIGNIFICANT CHANGE UP
BILIRUB UR-MCNC: NEGATIVE — SIGNIFICANT CHANGE UP
BLOOD GAS VENOUS COMPREHENSIVE RESULT: SIGNIFICANT CHANGE UP
BUN SERPL-MCNC: 8 MG/DL — SIGNIFICANT CHANGE UP (ref 7–23)
CALCIUM SERPL-MCNC: 8.7 MG/DL — SIGNIFICANT CHANGE UP (ref 8.4–10.5)
CAST: 2 /LPF — SIGNIFICANT CHANGE UP (ref 0–4)
CHLORIDE SERPL-SCNC: 102 MMOL/L — SIGNIFICANT CHANGE UP (ref 98–107)
CO2 SERPL-SCNC: 20 MMOL/L — LOW (ref 22–31)
COLOR SPEC: YELLOW — SIGNIFICANT CHANGE UP
COLOR SPEC: YELLOW — SIGNIFICANT CHANGE UP
CREAT SERPL-MCNC: 0.74 MG/DL — SIGNIFICANT CHANGE UP (ref 0.5–1.3)
DIFF PNL FLD: ABNORMAL
DIFF PNL FLD: ABNORMAL
EGFR: 109 ML/MIN/1.73M2 — SIGNIFICANT CHANGE UP
EGFR: 109 ML/MIN/1.73M2 — SIGNIFICANT CHANGE UP
EOSINOPHIL # BLD AUTO: 0.01 K/UL — SIGNIFICANT CHANGE UP (ref 0–0.5)
EOSINOPHIL NFR BLD AUTO: 0.1 % — SIGNIFICANT CHANGE UP (ref 0–6)
GLUCOSE SERPL-MCNC: 90 MG/DL — SIGNIFICANT CHANGE UP (ref 70–99)
GLUCOSE UR QL: NEGATIVE MG/DL — SIGNIFICANT CHANGE UP
GLUCOSE UR QL: NEGATIVE MG/DL — SIGNIFICANT CHANGE UP
HCG SERPL-ACNC: <1 MIU/ML — SIGNIFICANT CHANGE UP
HCT VFR BLD CALC: 37 % — SIGNIFICANT CHANGE UP (ref 34.5–45)
HGB BLD-MCNC: 12 G/DL — SIGNIFICANT CHANGE UP (ref 11.5–15.5)
IANC: 7.59 K/UL — HIGH (ref 1.8–7.4)
IMM GRANULOCYTES NFR BLD AUTO: 0.2 % — SIGNIFICANT CHANGE UP (ref 0–0.9)
INR BLD: 1.17 RATIO — HIGH (ref 0.85–1.16)
KETONES UR-MCNC: NEGATIVE MG/DL — SIGNIFICANT CHANGE UP
KETONES UR-MCNC: NEGATIVE MG/DL — SIGNIFICANT CHANGE UP
LACTATE SERPL-SCNC: 1.2 MMOL/L — SIGNIFICANT CHANGE UP (ref 0.5–2)
LEUKOCYTE ESTERASE UR-ACNC: ABNORMAL
LEUKOCYTE ESTERASE UR-ACNC: ABNORMAL
LYMPHOCYTES # BLD AUTO: 1.11 K/UL — SIGNIFICANT CHANGE UP (ref 1–3.3)
LYMPHOCYTES # BLD AUTO: 11.5 % — LOW (ref 13–44)
MCHC RBC-ENTMCNC: 27.3 PG — SIGNIFICANT CHANGE UP (ref 27–34)
MCHC RBC-ENTMCNC: 32.4 G/DL — SIGNIFICANT CHANGE UP (ref 32–36)
MCV RBC AUTO: 84.1 FL — SIGNIFICANT CHANGE UP (ref 80–100)
MONOCYTES # BLD AUTO: 0.87 K/UL — SIGNIFICANT CHANGE UP (ref 0–0.9)
MONOCYTES NFR BLD AUTO: 9 % — SIGNIFICANT CHANGE UP (ref 2–14)
NEUTROPHILS # BLD AUTO: 7.59 K/UL — HIGH (ref 1.8–7.4)
NEUTROPHILS NFR BLD AUTO: 78.9 % — HIGH (ref 43–77)
NITRITE UR-MCNC: NEGATIVE — SIGNIFICANT CHANGE UP
NITRITE UR-MCNC: NEGATIVE — SIGNIFICANT CHANGE UP
NRBC # BLD AUTO: 0 K/UL — SIGNIFICANT CHANGE UP (ref 0–0)
NRBC # BLD: 0 /100 WBCS — SIGNIFICANT CHANGE UP (ref 0–0)
NRBC # FLD: 0 K/UL — SIGNIFICANT CHANGE UP (ref 0–0)
NRBC BLD-RTO: 0 /100 WBCS — SIGNIFICANT CHANGE UP (ref 0–0)
PH UR: 6.5 — SIGNIFICANT CHANGE UP (ref 5–8)
PH UR: 6.5 — SIGNIFICANT CHANGE UP (ref 5–8)
PLATELET # BLD AUTO: 216 K/UL — SIGNIFICANT CHANGE UP (ref 150–400)
POTASSIUM SERPL-MCNC: 3.6 MMOL/L — SIGNIFICANT CHANGE UP (ref 3.5–5.3)
POTASSIUM SERPL-SCNC: 3.6 MMOL/L — SIGNIFICANT CHANGE UP (ref 3.5–5.3)
PROT SERPL-MCNC: 7.7 G/DL — SIGNIFICANT CHANGE UP (ref 6–8.3)
PROT UR-MCNC: NEGATIVE MG/DL — SIGNIFICANT CHANGE UP
PROT UR-MCNC: NEGATIVE MG/DL — SIGNIFICANT CHANGE UP
PROTHROM AB SERPL-ACNC: 13.5 SEC — HIGH (ref 9.9–13.4)
RBC # BLD: 4.4 M/UL — SIGNIFICANT CHANGE UP (ref 3.8–5.2)
RBC # FLD: 14 % — SIGNIFICANT CHANGE UP (ref 10.3–14.5)
RBC CASTS # UR COMP ASSIST: 2 /HPF — SIGNIFICANT CHANGE UP (ref 0–4)
REVIEW: SIGNIFICANT CHANGE UP
SODIUM SERPL-SCNC: 138 MMOL/L — SIGNIFICANT CHANGE UP (ref 135–145)
SP GR SPEC: 1.01 — SIGNIFICANT CHANGE UP (ref 1–1.03)
SP GR SPEC: 1.01 — SIGNIFICANT CHANGE UP (ref 1–1.03)
SQUAMOUS # UR AUTO: 6 /HPF — HIGH (ref 0–5)
UROBILINOGEN FLD QL: 0.2 MG/DL — SIGNIFICANT CHANGE UP (ref 0.2–1)
UROBILINOGEN FLD QL: 0.2 MG/DL — SIGNIFICANT CHANGE UP (ref 0.2–1)
WBC # BLD: 9.63 K/UL — SIGNIFICANT CHANGE UP (ref 3.8–10.5)
WBC # FLD AUTO: 9.63 K/UL — SIGNIFICANT CHANGE UP (ref 3.8–10.5)
WBC UR QL: 10 /HPF — HIGH (ref 0–5)

## 2024-11-14 PROCEDURE — 76775 US EXAM ABDO BACK WALL LIM: CPT | Mod: 26

## 2024-11-14 PROCEDURE — 74177 CT ABD & PELVIS W/CONTRAST: CPT | Mod: 26,MC

## 2024-11-14 PROCEDURE — 99223 1ST HOSP IP/OBS HIGH 75: CPT

## 2024-11-14 RX ORDER — KETOROLAC TROMETHAMINE 30 MG/ML
30 INJECTION, SOLUTION INTRAMUSCULAR; INTRAVENOUS EVERY 6 HOURS
Refills: 0 | Status: DISCONTINUED | OUTPATIENT
Start: 2024-11-14 | End: 2024-11-15

## 2024-11-14 RX ORDER — ACETAMINOPHEN 500 MG/5ML
975 LIQUID (ML) ORAL ONCE
Refills: 0 | Status: COMPLETED | OUTPATIENT
Start: 2024-11-14 | End: 2024-11-14

## 2024-11-14 RX ORDER — CEFTRIAXONE 500 MG/1
1000 INJECTION, POWDER, FOR SOLUTION INTRAMUSCULAR; INTRAVENOUS ONCE
Refills: 0 | Status: COMPLETED | OUTPATIENT
Start: 2024-11-14 | End: 2024-11-14

## 2024-11-14 RX ORDER — KETOROLAC TROMETHAMINE 30 MG/ML
15 INJECTION, SOLUTION INTRAMUSCULAR; INTRAVENOUS ONCE
Refills: 0 | Status: DISCONTINUED | OUTPATIENT
Start: 2024-11-14 | End: 2024-11-14

## 2024-11-14 RX ORDER — CEFTRIAXONE 500 MG/1
1000 INJECTION, POWDER, FOR SOLUTION INTRAMUSCULAR; INTRAVENOUS ONCE
Refills: 0 | Status: COMPLETED | OUTPATIENT
Start: 2024-11-15 | End: 2024-11-15

## 2024-11-14 RX ORDER — ACETAMINOPHEN 500 MG/5ML
1000 LIQUID (ML) ORAL ONCE
Refills: 0 | Status: COMPLETED | OUTPATIENT
Start: 2024-11-14 | End: 2024-11-14

## 2024-11-14 RX ORDER — ACETAMINOPHEN 500 MG/5ML
650 LIQUID (ML) ORAL EVERY 6 HOURS
Refills: 0 | Status: DISCONTINUED | OUTPATIENT
Start: 2024-11-14 | End: 2024-11-17

## 2024-11-14 RX ADMIN — Medication 975 MILLIGRAM(S): at 16:56

## 2024-11-14 RX ADMIN — Medication 975 MILLIGRAM(S): at 16:12

## 2024-11-14 RX ADMIN — CEFTRIAXONE 100 MILLIGRAM(S): 500 INJECTION, POWDER, FOR SOLUTION INTRAMUSCULAR; INTRAVENOUS at 09:31

## 2024-11-14 RX ADMIN — Medication 125 MILLILITER(S): at 20:02

## 2024-11-14 RX ADMIN — Medication 400 MILLIGRAM(S): at 09:30

## 2024-11-14 RX ADMIN — Medication 975 MILLIGRAM(S): at 21:58

## 2024-11-14 RX ADMIN — KETOROLAC TROMETHAMINE 15 MILLIGRAM(S): 30 INJECTION, SOLUTION INTRAMUSCULAR; INTRAVENOUS at 09:30

## 2024-11-14 RX ADMIN — Medication 2000 MILLILITER(S): at 09:30

## 2024-11-15 VITALS
RESPIRATION RATE: 16 BRPM | TEMPERATURE: 98 F | HEART RATE: 89 BPM | DIASTOLIC BLOOD PRESSURE: 81 MMHG | SYSTOLIC BLOOD PRESSURE: 103 MMHG | OXYGEN SATURATION: 100 %

## 2024-11-15 LAB
ANION GAP SERPL CALC-SCNC: 14 MMOL/L — SIGNIFICANT CHANGE UP (ref 7–14)
BASOPHILS # BLD AUTO: 0.02 K/UL — SIGNIFICANT CHANGE UP (ref 0–0.2)
BASOPHILS NFR BLD AUTO: 0.3 % — SIGNIFICANT CHANGE UP (ref 0–2)
BLOOD GAS VENOUS COMPREHENSIVE RESULT: SIGNIFICANT CHANGE UP
BUN SERPL-MCNC: 4 MG/DL — LOW (ref 7–23)
CALCIUM SERPL-MCNC: 7.8 MG/DL — LOW (ref 8.4–10.5)
CHLORIDE SERPL-SCNC: 106 MMOL/L — SIGNIFICANT CHANGE UP (ref 98–107)
CO2 SERPL-SCNC: 17 MMOL/L — LOW (ref 22–31)
CREAT SERPL-MCNC: 0.57 MG/DL — SIGNIFICANT CHANGE UP (ref 0.5–1.3)
CULTURE RESULTS: SIGNIFICANT CHANGE UP
EGFR: 122 ML/MIN/1.73M2 — SIGNIFICANT CHANGE UP
EGFR: 122 ML/MIN/1.73M2 — SIGNIFICANT CHANGE UP
EOSINOPHIL # BLD AUTO: 0.01 K/UL — SIGNIFICANT CHANGE UP (ref 0–0.5)
EOSINOPHIL NFR BLD AUTO: 0.1 % — SIGNIFICANT CHANGE UP (ref 0–6)
GLUCOSE SERPL-MCNC: 93 MG/DL — SIGNIFICANT CHANGE UP (ref 70–99)
HCT VFR BLD CALC: 33.5 % — LOW (ref 34.5–45)
HGB BLD-MCNC: 10.9 G/DL — LOW (ref 11.5–15.5)
IANC: 5.46 K/UL — SIGNIFICANT CHANGE UP (ref 1.8–7.4)
IMM GRANULOCYTES NFR BLD AUTO: 0.4 % — SIGNIFICANT CHANGE UP (ref 0–0.9)
LYMPHOCYTES # BLD AUTO: 1.11 K/UL — SIGNIFICANT CHANGE UP (ref 1–3.3)
LYMPHOCYTES # BLD AUTO: 14.6 % — SIGNIFICANT CHANGE UP (ref 13–44)
MCHC RBC-ENTMCNC: 27.3 PG — SIGNIFICANT CHANGE UP (ref 27–34)
MCHC RBC-ENTMCNC: 32.5 G/DL — SIGNIFICANT CHANGE UP (ref 32–36)
MCV RBC AUTO: 83.8 FL — SIGNIFICANT CHANGE UP (ref 80–100)
MONOCYTES # BLD AUTO: 0.97 K/UL — HIGH (ref 0–0.9)
MONOCYTES NFR BLD AUTO: 12.8 % — SIGNIFICANT CHANGE UP (ref 2–14)
NEUTROPHILS # BLD AUTO: 5.46 K/UL — SIGNIFICANT CHANGE UP (ref 1.8–7.4)
NEUTROPHILS NFR BLD AUTO: 71.8 % — SIGNIFICANT CHANGE UP (ref 43–77)
NRBC # BLD AUTO: 0 K/UL — SIGNIFICANT CHANGE UP (ref 0–0)
NRBC # BLD: 0 /100 WBCS — SIGNIFICANT CHANGE UP (ref 0–0)
NRBC # FLD: 0 K/UL — SIGNIFICANT CHANGE UP (ref 0–0)
NRBC BLD-RTO: 0 /100 WBCS — SIGNIFICANT CHANGE UP (ref 0–0)
PLATELET # BLD AUTO: 174 K/UL — SIGNIFICANT CHANGE UP (ref 150–400)
POTASSIUM SERPL-MCNC: 3.5 MMOL/L — SIGNIFICANT CHANGE UP (ref 3.5–5.3)
POTASSIUM SERPL-SCNC: 3.5 MMOL/L — SIGNIFICANT CHANGE UP (ref 3.5–5.3)
RBC # BLD: 4 M/UL — SIGNIFICANT CHANGE UP (ref 3.8–5.2)
RBC # FLD: 14 % — SIGNIFICANT CHANGE UP (ref 10.3–14.5)
SODIUM SERPL-SCNC: 137 MMOL/L — SIGNIFICANT CHANGE UP (ref 135–145)
SPECIMEN SOURCE: SIGNIFICANT CHANGE UP
WBC # BLD: 7.6 K/UL — SIGNIFICANT CHANGE UP (ref 3.8–10.5)
WBC # FLD AUTO: 7.6 K/UL — SIGNIFICANT CHANGE UP (ref 3.8–10.5)

## 2024-11-15 PROCEDURE — 99239 HOSP IP/OBS DSCHRG MGMT >30: CPT

## 2024-11-15 RX ORDER — IBUPROFEN 200 MG
1 TABLET ORAL
Qty: 40 | Refills: 0
Start: 2024-11-15 | End: 2024-11-24

## 2024-11-15 RX ORDER — CEFDINIR 250 MG/5ML
1 POWDER, FOR SUSPENSION ORAL
Qty: 20 | Refills: 0
Start: 2024-11-15 | End: 2024-11-24

## 2024-11-15 RX ADMIN — Medication 650 MILLIGRAM(S): at 10:00

## 2024-11-15 RX ADMIN — CEFTRIAXONE 100 MILLIGRAM(S): 500 INJECTION, POWDER, FOR SOLUTION INTRAMUSCULAR; INTRAVENOUS at 09:35

## 2024-11-15 RX ADMIN — Medication 125 MILLILITER(S): at 06:17

## 2024-11-15 RX ADMIN — KETOROLAC TROMETHAMINE 30 MILLIGRAM(S): 30 INJECTION, SOLUTION INTRAMUSCULAR; INTRAVENOUS at 10:00

## 2024-11-15 RX ADMIN — KETOROLAC TROMETHAMINE 30 MILLIGRAM(S): 30 INJECTION, SOLUTION INTRAMUSCULAR; INTRAVENOUS at 09:36

## 2024-11-15 RX ADMIN — Medication 650 MILLIGRAM(S): at 09:35

## 2024-12-18 ENCOUNTER — APPOINTMENT (OUTPATIENT)
Dept: UROLOGY | Facility: CLINIC | Age: 34
End: 2024-12-18

## 2024-12-25 PROBLEM — F10.90 ALCOHOL USE: Status: INACTIVE | Noted: 2019-10-18

## 2025-02-27 ENCOUNTER — APPOINTMENT (OUTPATIENT)
Dept: UROLOGY | Facility: CLINIC | Age: 35
End: 2025-02-27
Payer: COMMERCIAL

## 2025-02-27 VITALS
HEART RATE: 114 BPM | SYSTOLIC BLOOD PRESSURE: 120 MMHG | DIASTOLIC BLOOD PRESSURE: 81 MMHG | OXYGEN SATURATION: 98 % | TEMPERATURE: 95.4 F

## 2025-02-27 DIAGNOSIS — N30.90 CYSTITIS, UNSPECIFIED W/OUT HEMATURIA: ICD-10-CM

## 2025-02-27 PROCEDURE — 99214 OFFICE O/P EST MOD 30 MIN: CPT

## 2025-03-02 LAB
APPEARANCE: CLEAR
BACTERIA UR CULT: NORMAL
BACTERIA: ABNORMAL /HPF
BILIRUBIN URINE: NEGATIVE
BLOOD URINE: ABNORMAL
CAST: 0 /LPF
COLOR: YELLOW
EPITHELIAL CELLS: 5 /HPF
GLUCOSE QUALITATIVE U: NEGATIVE MG/DL
KETONES URINE: NEGATIVE MG/DL
LEUKOCYTE ESTERASE URINE: ABNORMAL
MICROSCOPIC-UA: NORMAL
NITRITE URINE: NEGATIVE
PH URINE: 6.5
PROTEIN URINE: NORMAL MG/DL
RED BLOOD CELLS URINE: 3 /HPF
REVIEW: NORMAL
SPECIFIC GRAVITY URINE: 1.02
UROBILINOGEN URINE: 0.2 MG/DL
WHITE BLOOD CELLS URINE: 0 /HPF

## 2025-03-04 PROBLEM — N30.90 CYSTITIS: Status: ACTIVE | Noted: 2025-03-04

## 2025-08-27 DIAGNOSIS — N39.0 URINARY TRACT INFECTION, SITE NOT SPECIFIED: ICD-10-CM

## 2025-08-27 DIAGNOSIS — A49.9 URINARY TRACT INFECTION, SITE NOT SPECIFIED: ICD-10-CM

## 2025-08-27 LAB
APPEARANCE: CLEAR
BACTERIA UR CULT: ABNORMAL
BACTERIA: NEGATIVE /HPF
BILIRUBIN URINE: NEGATIVE
BLOOD URINE: ABNORMAL
CAST: 0 /LPF
COLOR: YELLOW
EPITHELIAL CELLS: 5 /HPF
GLUCOSE QUALITATIVE U: NEGATIVE MG/DL
KETONES URINE: NEGATIVE MG/DL
LEUKOCYTE ESTERASE URINE: ABNORMAL
MICROSCOPIC-UA: NORMAL
NITRITE URINE: NEGATIVE
PH URINE: 6.5
PROTEIN URINE: NEGATIVE MG/DL
RED BLOOD CELLS URINE: 1 /HPF
SPECIFIC GRAVITY URINE: 1.01
UROBILINOGEN URINE: 0.2 MG/DL
WHITE BLOOD CELLS URINE: 5 /HPF

## 2025-08-27 RX ORDER — SULFAMETHOXAZOLE AND TRIMETHOPRIM 800; 160 MG/1; MG/1
800-160 TABLET ORAL TWICE DAILY
Qty: 14 | Refills: 0 | Status: ACTIVE | COMMUNITY
Start: 2025-08-27 | End: 1900-01-01

## 2025-08-31 ENCOUNTER — EMERGENCY (EMERGENCY)
Facility: HOSPITAL | Age: 35
LOS: 1 days | End: 2025-08-31
Attending: STUDENT IN AN ORGANIZED HEALTH CARE EDUCATION/TRAINING PROGRAM | Admitting: STUDENT IN AN ORGANIZED HEALTH CARE EDUCATION/TRAINING PROGRAM
Payer: COMMERCIAL

## 2025-08-31 VITALS
SYSTOLIC BLOOD PRESSURE: 101 MMHG | RESPIRATION RATE: 18 BRPM | DIASTOLIC BLOOD PRESSURE: 69 MMHG | HEART RATE: 74 BPM | OXYGEN SATURATION: 98 % | TEMPERATURE: 98 F

## 2025-08-31 VITALS
HEART RATE: 99 BPM | TEMPERATURE: 98 F | SYSTOLIC BLOOD PRESSURE: 99 MMHG | WEIGHT: 158.95 LBS | HEIGHT: 66 IN | RESPIRATION RATE: 18 BRPM | DIASTOLIC BLOOD PRESSURE: 69 MMHG

## 2025-08-31 DIAGNOSIS — Z98.891 HISTORY OF UTERINE SCAR FROM PREVIOUS SURGERY: Chronic | ICD-10-CM

## 2025-08-31 LAB
ALBUMIN SERPL ELPH-MCNC: 4.2 G/DL — SIGNIFICANT CHANGE UP (ref 3.3–5)
ALP SERPL-CCNC: 62 U/L — SIGNIFICANT CHANGE UP (ref 40–120)
ALT FLD-CCNC: 16 U/L — SIGNIFICANT CHANGE UP (ref 4–33)
ANION GAP SERPL CALC-SCNC: 11 MMOL/L — SIGNIFICANT CHANGE UP (ref 7–14)
APPEARANCE UR: ABNORMAL
AST SERPL-CCNC: 17 U/L — SIGNIFICANT CHANGE UP (ref 4–32)
BACTERIA # UR AUTO: ABNORMAL /HPF
BASOPHILS # BLD AUTO: 0.03 K/UL — SIGNIFICANT CHANGE UP (ref 0–0.2)
BASOPHILS NFR BLD AUTO: 0.4 % — SIGNIFICANT CHANGE UP (ref 0–2)
BILIRUB SERPL-MCNC: 0.3 MG/DL — SIGNIFICANT CHANGE UP (ref 0.2–1.2)
BILIRUB UR-MCNC: NEGATIVE — SIGNIFICANT CHANGE UP
BUN SERPL-MCNC: 7 MG/DL — SIGNIFICANT CHANGE UP (ref 7–23)
CALCIUM SERPL-MCNC: 8.7 MG/DL — SIGNIFICANT CHANGE UP (ref 8.4–10.5)
CAST: 0 /LPF — SIGNIFICANT CHANGE UP (ref 0–4)
CHLORIDE SERPL-SCNC: 105 MMOL/L — SIGNIFICANT CHANGE UP (ref 98–107)
CO2 SERPL-SCNC: 21 MMOL/L — LOW (ref 22–31)
COLOR SPEC: YELLOW — SIGNIFICANT CHANGE UP
CREAT SERPL-MCNC: 0.59 MG/DL — SIGNIFICANT CHANGE UP (ref 0.5–1.3)
DIFF PNL FLD: ABNORMAL
EGFR: 120 ML/MIN/1.73M2 — SIGNIFICANT CHANGE UP
EGFR: 120 ML/MIN/1.73M2 — SIGNIFICANT CHANGE UP
EOSINOPHIL # BLD AUTO: 0.06 K/UL — SIGNIFICANT CHANGE UP (ref 0–0.5)
EOSINOPHIL NFR BLD AUTO: 0.7 % — SIGNIFICANT CHANGE UP (ref 0–6)
FLUAV AG NPH QL: SIGNIFICANT CHANGE UP
FLUBV AG NPH QL: SIGNIFICANT CHANGE UP
GLUCOSE SERPL-MCNC: 96 MG/DL — SIGNIFICANT CHANGE UP (ref 70–99)
GLUCOSE UR QL: NEGATIVE MG/DL — SIGNIFICANT CHANGE UP
HCT VFR BLD CALC: 38.5 % — SIGNIFICANT CHANGE UP (ref 34.5–45)
HGB BLD-MCNC: 11.8 G/DL — SIGNIFICANT CHANGE UP (ref 11.5–15.5)
IMM GRANULOCYTES # BLD AUTO: 0.02 K/UL — SIGNIFICANT CHANGE UP (ref 0–0.07)
IMM GRANULOCYTES NFR BLD AUTO: 0.2 % — SIGNIFICANT CHANGE UP (ref 0–0.9)
KETONES UR QL: NEGATIVE MG/DL — SIGNIFICANT CHANGE UP
LEUKOCYTE ESTERASE UR-ACNC: ABNORMAL
LYMPHOCYTES # BLD AUTO: 1.74 K/UL — SIGNIFICANT CHANGE UP (ref 1–3.3)
LYMPHOCYTES NFR BLD AUTO: 21.4 % — SIGNIFICANT CHANGE UP (ref 13–44)
MCHC RBC-ENTMCNC: 25.9 PG — LOW (ref 27–34)
MCHC RBC-ENTMCNC: 30.6 G/DL — LOW (ref 32–36)
MCV RBC AUTO: 84.6 FL — SIGNIFICANT CHANGE UP (ref 80–100)
MONOCYTES # BLD AUTO: 1.25 K/UL — HIGH (ref 0–0.9)
MONOCYTES NFR BLD AUTO: 15.4 % — HIGH (ref 2–14)
NEUTROPHILS # BLD AUTO: 5.04 K/UL — SIGNIFICANT CHANGE UP (ref 1.8–7.4)
NEUTROPHILS NFR BLD AUTO: 61.9 % — SIGNIFICANT CHANGE UP (ref 43–77)
NITRITE UR-MCNC: NEGATIVE — SIGNIFICANT CHANGE UP
NRBC # BLD AUTO: 0 K/UL — SIGNIFICANT CHANGE UP (ref 0–0)
NRBC # FLD: 0 K/UL — SIGNIFICANT CHANGE UP (ref 0–0)
NRBC BLD AUTO-RTO: 0 /100 WBCS — SIGNIFICANT CHANGE UP (ref 0–0)
PH UR: 6.5 — SIGNIFICANT CHANGE UP (ref 5–8)
PLATELET # BLD AUTO: 212 K/UL — SIGNIFICANT CHANGE UP (ref 150–400)
PMV BLD: 12.6 FL — SIGNIFICANT CHANGE UP (ref 7–13)
POTASSIUM SERPL-MCNC: 3.8 MMOL/L — SIGNIFICANT CHANGE UP (ref 3.5–5.3)
POTASSIUM SERPL-SCNC: 3.8 MMOL/L — SIGNIFICANT CHANGE UP (ref 3.5–5.3)
PROT SERPL-MCNC: 7.3 G/DL — SIGNIFICANT CHANGE UP (ref 6–8.3)
PROT UR-MCNC: SIGNIFICANT CHANGE UP MG/DL
RBC # BLD: 4.55 M/UL — SIGNIFICANT CHANGE UP (ref 3.8–5.2)
RBC # FLD: 15 % — HIGH (ref 10.3–14.5)
RBC CASTS # UR COMP ASSIST: 8 /HPF — HIGH (ref 0–4)
RSV RNA NPH QL NAA+NON-PROBE: SIGNIFICANT CHANGE UP
SARS-COV-2 RNA SPEC QL NAA+PROBE: SIGNIFICANT CHANGE UP
SODIUM SERPL-SCNC: 137 MMOL/L — SIGNIFICANT CHANGE UP (ref 135–145)
SOURCE RESPIRATORY: SIGNIFICANT CHANGE UP
SP GR SPEC: 1.01 — SIGNIFICANT CHANGE UP (ref 1–1.03)
SQUAMOUS # UR AUTO: 11 /HPF — HIGH (ref 0–5)
UROBILINOGEN FLD QL: 0.2 MG/DL — SIGNIFICANT CHANGE UP (ref 0.2–1)
WBC # BLD: 8.14 K/UL — SIGNIFICANT CHANGE UP (ref 3.8–10.5)
WBC # FLD AUTO: 8.14 K/UL — SIGNIFICANT CHANGE UP (ref 3.8–10.5)
WBC UR QL: 13 /HPF — HIGH (ref 0–5)

## 2025-08-31 PROCEDURE — 99284 EMERGENCY DEPT VISIT MOD MDM: CPT

## 2025-08-31 RX ORDER — CEFTRIAXONE 500 MG/1
1000 INJECTION, POWDER, FOR SOLUTION INTRAMUSCULAR; INTRAVENOUS ONCE
Refills: 0 | Status: COMPLETED | OUTPATIENT
Start: 2025-08-31 | End: 2025-08-31

## 2025-08-31 RX ORDER — CEFDINIR 250 MG/5ML
1 POWDER, FOR SUSPENSION ORAL
Qty: 14 | Refills: 0
Start: 2025-08-31 | End: 2025-09-06

## 2025-08-31 RX ORDER — ACETAMINOPHEN 500 MG/5ML
650 LIQUID (ML) ORAL ONCE
Refills: 0 | Status: COMPLETED | OUTPATIENT
Start: 2025-08-31 | End: 2025-08-31

## 2025-08-31 RX ADMIN — Medication 1000 MILLILITER(S): at 12:51

## 2025-08-31 RX ADMIN — Medication 650 MILLIGRAM(S): at 12:52

## 2025-08-31 RX ADMIN — CEFTRIAXONE 100 MILLIGRAM(S): 500 INJECTION, POWDER, FOR SOLUTION INTRAMUSCULAR; INTRAVENOUS at 12:51

## 2025-09-03 LAB
-  AMOXICILLIN/CLAVULANIC ACID: SIGNIFICANT CHANGE UP
-  AMPICILLIN/SULBACTAM: SIGNIFICANT CHANGE UP
-  AMPICILLIN: SIGNIFICANT CHANGE UP
-  AZTREONAM: SIGNIFICANT CHANGE UP
-  CEFAZOLIN: SIGNIFICANT CHANGE UP
-  CEFEPIME: SIGNIFICANT CHANGE UP
-  CEFOXITIN: SIGNIFICANT CHANGE UP
-  CEFTRIAXONE: SIGNIFICANT CHANGE UP
-  CEFUROXIME: SIGNIFICANT CHANGE UP
-  CIPROFLOXACIN: SIGNIFICANT CHANGE UP
-  ERTAPENEM: SIGNIFICANT CHANGE UP
-  GENTAMICIN: SIGNIFICANT CHANGE UP
-  IMIPENEM: SIGNIFICANT CHANGE UP
-  LEVOFLOXACIN: SIGNIFICANT CHANGE UP
-  MEROPENEM: SIGNIFICANT CHANGE UP
-  NITROFURANTOIN: SIGNIFICANT CHANGE UP
-  PIPERACILLIN/TAZOBACTAM: SIGNIFICANT CHANGE UP
-  TIGECYCLINE: SIGNIFICANT CHANGE UP
-  TOBRAMYCIN: SIGNIFICANT CHANGE UP
-  TRIMETHOPRIM/SULFAMETHOXAZOLE: SIGNIFICANT CHANGE UP
CULTURE RESULTS: ABNORMAL
METHOD TYPE: SIGNIFICANT CHANGE UP
ORGANISM # SPEC MICROSCOPIC CNT: ABNORMAL
ORGANISM # SPEC MICROSCOPIC CNT: ABNORMAL
SPECIMEN SOURCE: SIGNIFICANT CHANGE UP

## 2025-09-19 ENCOUNTER — APPOINTMENT (OUTPATIENT)
Dept: UROLOGY | Facility: CLINIC | Age: 35
End: 2025-09-19